# Patient Record
Sex: FEMALE | Race: WHITE | Employment: OTHER
[De-identification: names, ages, dates, MRNs, and addresses within clinical notes are randomized per-mention and may not be internally consistent; named-entity substitution may affect disease eponyms.]

---

## 2017-01-11 ENCOUNTER — PROCEDURE VISIT (OUTPATIENT)
Dept: UROLOGY | Facility: CLINIC | Age: 72
End: 2017-01-11

## 2017-01-11 VITALS — DIASTOLIC BLOOD PRESSURE: 74 MMHG | HEART RATE: 61 BPM | SYSTOLIC BLOOD PRESSURE: 133 MMHG | TEMPERATURE: 97.8 F

## 2017-01-11 DIAGNOSIS — C67.9 MALIGNANT NEOPLASM OF URINARY BLADDER, UNSPECIFIED SITE (HCC): Primary | ICD-10-CM

## 2017-01-11 LAB
BILIRUBIN, POC: ABNORMAL
BLOOD URINE, POC: ABNORMAL
CLARITY, POC: CLEAR
COLOR, POC: YELLOW
GLUCOSE URINE, POC: ABNORMAL
KETONES, POC: ABNORMAL
LEUKOCYTE EST, POC: ABNORMAL
NITRITE, POC: ABNORMAL
PH, POC: ABNORMAL
PROTEIN, POC: ABNORMAL
SPECIFIC GRAVITY, POC: ABNORMAL
UROBILINOGEN, POC: ABNORMAL

## 2017-01-11 PROCEDURE — 51720 TREATMENT OF BLADDER LESION: CPT | Performed by: NURSE PRACTITIONER

## 2017-01-11 PROCEDURE — 81003 URINALYSIS AUTO W/O SCOPE: CPT | Performed by: NURSE PRACTITIONER

## 2017-01-13 LAB
CHOLESTEROL, TOTAL: 214 MG/DL
CHOLESTEROL/HDL RATIO: 3.4
HDLC SERPL-MCNC: 63 MG/DL (ref 35–70)
LDL CHOLESTEROL CALCULATED: 129 MG/DL (ref 0–160)
TRIGL SERPL-MCNC: 109 MG/DL
TSH SERPL DL<=0.05 MIU/L-ACNC: 8.09 UIU/ML
VLDLC SERPL CALC-MCNC: 22 MG/DL

## 2017-01-17 ENCOUNTER — OFFICE VISIT (OUTPATIENT)
Dept: INTERNAL MEDICINE | Facility: CLINIC | Age: 72
End: 2017-01-17

## 2017-01-17 VITALS — HEIGHT: 64 IN | HEART RATE: 74 BPM | WEIGHT: 222 LBS | BODY MASS INDEX: 37.9 KG/M2 | RESPIRATION RATE: 14 BRPM

## 2017-01-17 DIAGNOSIS — E66.09 NON MORBID OBESITY DUE TO EXCESS CALORIES: ICD-10-CM

## 2017-01-17 DIAGNOSIS — I10 ESSENTIAL HYPERTENSION: Primary | ICD-10-CM

## 2017-01-17 DIAGNOSIS — C67.9 MALIGNANT NEOPLASM OF URINARY BLADDER, UNSPECIFIED SITE (HCC): ICD-10-CM

## 2017-01-17 DIAGNOSIS — I65.23 BILATERAL CAROTID ARTERY STENOSIS: ICD-10-CM

## 2017-01-17 DIAGNOSIS — K21.9 GASTROESOPHAGEAL REFLUX DISEASE, ESOPHAGITIS PRESENCE NOT SPECIFIED: ICD-10-CM

## 2017-01-17 DIAGNOSIS — E03.9 ACQUIRED HYPOTHYROIDISM: ICD-10-CM

## 2017-01-17 DIAGNOSIS — I25.10 CORONARY ARTERY DISEASE INVOLVING NATIVE CORONARY ARTERY OF NATIVE HEART WITHOUT ANGINA PECTORIS: ICD-10-CM

## 2017-01-17 DIAGNOSIS — I10 ESSENTIAL HYPERTENSION: ICD-10-CM

## 2017-01-17 DIAGNOSIS — M51.36 DDD (DEGENERATIVE DISC DISEASE), LUMBAR: ICD-10-CM

## 2017-01-17 DIAGNOSIS — G47.33 OSA (OBSTRUCTIVE SLEEP APNEA): ICD-10-CM

## 2017-01-17 PROCEDURE — G8484 FLU IMMUNIZE NO ADMIN: HCPCS | Performed by: INTERNAL MEDICINE

## 2017-01-17 PROCEDURE — 99213 OFFICE O/P EST LOW 20 MIN: CPT | Performed by: INTERNAL MEDICINE

## 2017-01-17 PROCEDURE — G8598 ASA/ANTIPLAT THER USED: HCPCS | Performed by: INTERNAL MEDICINE

## 2017-01-17 PROCEDURE — 1090F PRES/ABSN URINE INCON ASSESS: CPT | Performed by: INTERNAL MEDICINE

## 2017-01-17 PROCEDURE — 3014F SCREEN MAMMO DOC REV: CPT | Performed by: INTERNAL MEDICINE

## 2017-01-17 PROCEDURE — 4040F PNEUMOC VAC/ADMIN/RCVD: CPT | Performed by: INTERNAL MEDICINE

## 2017-01-17 PROCEDURE — 1123F ACP DISCUSS/DSCN MKR DOCD: CPT | Performed by: INTERNAL MEDICINE

## 2017-01-17 PROCEDURE — 3017F COLORECTAL CA SCREEN DOC REV: CPT | Performed by: INTERNAL MEDICINE

## 2017-01-17 PROCEDURE — G8427 DOCREV CUR MEDS BY ELIG CLIN: HCPCS | Performed by: INTERNAL MEDICINE

## 2017-01-17 PROCEDURE — G8400 PT W/DXA NO RESULTS DOC: HCPCS | Performed by: INTERNAL MEDICINE

## 2017-01-17 PROCEDURE — G8419 CALC BMI OUT NRM PARAM NOF/U: HCPCS | Performed by: INTERNAL MEDICINE

## 2017-01-17 PROCEDURE — 1036F TOBACCO NON-USER: CPT | Performed by: INTERNAL MEDICINE

## 2017-01-17 ASSESSMENT — ENCOUNTER SYMPTOMS
SHORTNESS OF BREATH: 0
ABDOMINAL PAIN: 0
HEARTBURN: 1
NAUSEA: 0
BACK PAIN: 1
CHEST TIGHTNESS: 0
COUGH: 0
VOMITING: 0
BLOOD IN STOOL: 0

## 2017-01-18 ENCOUNTER — PROCEDURE VISIT (OUTPATIENT)
Dept: UROLOGY | Facility: CLINIC | Age: 72
End: 2017-01-18

## 2017-01-18 VITALS
SYSTOLIC BLOOD PRESSURE: 119 MMHG | HEIGHT: 64 IN | RESPIRATION RATE: 16 BRPM | WEIGHT: 222 LBS | HEART RATE: 69 BPM | TEMPERATURE: 98.1 F | BODY MASS INDEX: 37.9 KG/M2 | DIASTOLIC BLOOD PRESSURE: 71 MMHG

## 2017-01-18 DIAGNOSIS — C67.9 MALIGNANT NEOPLASM OF URINARY BLADDER, UNSPECIFIED SITE (HCC): Primary | ICD-10-CM

## 2017-01-18 LAB
BILIRUBIN, POC: NORMAL
BLOOD URINE, POC: NORMAL
CLARITY, POC: CLEAR
COLOR, POC: YELLOW
GLUCOSE URINE, POC: NORMAL
KETONES, POC: NORMAL
LEUKOCYTE EST, POC: NORMAL
NITRITE, POC: NORMAL
PH, POC: NORMAL
PROTEIN, POC: NORMAL
SPECIFIC GRAVITY, POC: NORMAL
UROBILINOGEN, POC: NORMAL

## 2017-01-18 PROCEDURE — 51720 TREATMENT OF BLADDER LESION: CPT | Performed by: NURSE PRACTITIONER

## 2017-01-18 PROCEDURE — 81003 URINALYSIS AUTO W/O SCOPE: CPT | Performed by: NURSE PRACTITIONER

## 2017-01-25 ENCOUNTER — PROCEDURE VISIT (OUTPATIENT)
Dept: UROLOGY | Facility: CLINIC | Age: 72
End: 2017-01-25

## 2017-01-25 VITALS — DIASTOLIC BLOOD PRESSURE: 88 MMHG | HEART RATE: 79 BPM | TEMPERATURE: 98.3 F | SYSTOLIC BLOOD PRESSURE: 144 MMHG

## 2017-01-25 DIAGNOSIS — C67.9 MALIGNANT NEOPLASM OF URINARY BLADDER, UNSPECIFIED SITE (HCC): Primary | ICD-10-CM

## 2017-01-25 PROCEDURE — 81003 URINALYSIS AUTO W/O SCOPE: CPT | Performed by: NURSE PRACTITIONER

## 2017-01-25 PROCEDURE — 51720 TREATMENT OF BLADDER LESION: CPT | Performed by: NURSE PRACTITIONER

## 2017-01-26 ENCOUNTER — TELEPHONE (OUTPATIENT)
Dept: UROLOGY | Facility: CLINIC | Age: 72
End: 2017-01-26

## 2017-01-26 DIAGNOSIS — C67.9 MALIGNANT NEOPLASM OF URINARY BLADDER, UNSPECIFIED SITE (HCC): Primary | ICD-10-CM

## 2017-02-01 ENCOUNTER — PROCEDURE VISIT (OUTPATIENT)
Dept: UROLOGY | Facility: CLINIC | Age: 72
End: 2017-02-01

## 2017-02-01 VITALS — DIASTOLIC BLOOD PRESSURE: 68 MMHG | SYSTOLIC BLOOD PRESSURE: 116 MMHG | HEART RATE: 65 BPM | TEMPERATURE: 97.9 F

## 2017-02-01 DIAGNOSIS — C67.9 MALIGNANT NEOPLASM OF URINARY BLADDER, UNSPECIFIED SITE (HCC): Primary | ICD-10-CM

## 2017-02-01 PROCEDURE — 81003 URINALYSIS AUTO W/O SCOPE: CPT | Performed by: NURSE PRACTITIONER

## 2017-02-01 PROCEDURE — 51720 TREATMENT OF BLADDER LESION: CPT | Performed by: NURSE PRACTITIONER

## 2017-02-06 ENCOUNTER — HOSPITAL ENCOUNTER (OUTPATIENT)
Dept: CT IMAGING | Age: 72
Discharge: HOME | End: 2017-02-06
Payer: MEDICARE

## 2017-02-06 DIAGNOSIS — R52 PAIN: ICD-10-CM

## 2017-02-06 DIAGNOSIS — C67.9 MALIGNANT NEOPLASM OF URINARY BLADDER, UNSPECIFIED SITE (HCC): ICD-10-CM

## 2017-02-06 PROCEDURE — 74178 CT ABD&PLV WO CNTR FLWD CNTR: CPT | Performed by: RADIOLOGY

## 2017-02-06 PROCEDURE — 6360000004 HC RX CONTRAST MEDICATION: Performed by: UROLOGY

## 2017-02-06 PROCEDURE — 74178 CT ABD&PLV WO CNTR FLWD CNTR: CPT

## 2017-02-06 RX ADMIN — IOVERSOL 130 ML: 741 INJECTION INTRA-ARTERIAL; INTRAVENOUS at 08:59

## 2017-02-08 ENCOUNTER — TELEPHONE (OUTPATIENT)
Dept: UROLOGY | Facility: CLINIC | Age: 72
End: 2017-02-08

## 2017-02-08 ENCOUNTER — PROCEDURE VISIT (OUTPATIENT)
Dept: UROLOGY | Facility: CLINIC | Age: 72
End: 2017-02-08

## 2017-02-08 VITALS — DIASTOLIC BLOOD PRESSURE: 68 MMHG | TEMPERATURE: 98 F | SYSTOLIC BLOOD PRESSURE: 119 MMHG | HEART RATE: 68 BPM

## 2017-02-08 DIAGNOSIS — C67.9 MALIGNANT NEOPLASM OF URINARY BLADDER, UNSPECIFIED SITE (HCC): Primary | ICD-10-CM

## 2017-02-08 PROCEDURE — 81003 URINALYSIS AUTO W/O SCOPE: CPT | Performed by: NURSE PRACTITIONER

## 2017-02-08 PROCEDURE — 51720 TREATMENT OF BLADDER LESION: CPT | Performed by: NURSE PRACTITIONER

## 2017-02-15 ENCOUNTER — PROCEDURE VISIT (OUTPATIENT)
Dept: UROLOGY | Facility: CLINIC | Age: 72
End: 2017-02-15

## 2017-02-15 VITALS — HEART RATE: 70 BPM | DIASTOLIC BLOOD PRESSURE: 70 MMHG | TEMPERATURE: 98.1 F | SYSTOLIC BLOOD PRESSURE: 129 MMHG

## 2017-02-15 DIAGNOSIS — C67.9 MALIGNANT NEOPLASM OF URINARY BLADDER, UNSPECIFIED SITE (HCC): Primary | ICD-10-CM

## 2017-02-15 PROCEDURE — 81003 URINALYSIS AUTO W/O SCOPE: CPT | Performed by: NURSE PRACTITIONER

## 2017-02-15 PROCEDURE — 51720 TREATMENT OF BLADDER LESION: CPT | Performed by: NURSE PRACTITIONER

## 2017-02-21 ENCOUNTER — TELEPHONE (OUTPATIENT)
Dept: UROLOGY | Facility: CLINIC | Age: 72
End: 2017-02-21

## 2017-02-28 ENCOUNTER — TELEPHONE (OUTPATIENT)
Dept: UROLOGY | Facility: CLINIC | Age: 72
End: 2017-02-28

## 2017-03-06 ENCOUNTER — HOSPITAL ENCOUNTER (OUTPATIENT)
Age: 72
Discharge: HOME | End: 2017-03-06
Payer: MEDICARE

## 2017-03-06 LAB
-: NORMAL
AMORPHOUS: NORMAL
BACTERIA: NORMAL
BILIRUBIN URINE: NEGATIVE
CASTS UA: NORMAL /LPF (ref 0–8)
COLOR: YELLOW
COMMENT UA: ABNORMAL
CRYSTALS, UA: NORMAL /HPF
EPITHELIAL CELLS UA: NORMAL /HPF (ref 0–5)
GLUCOSE URINE: NEGATIVE
KETONES, URINE: NEGATIVE
LEUKOCYTE ESTERASE, URINE: ABNORMAL
MUCUS: NORMAL
NITRITE, URINE: NEGATIVE
OTHER OBSERVATIONS UA: NORMAL
PH UA: 6 (ref 5–8)
PROTEIN UA: NEGATIVE
RBC UA: NORMAL /HPF (ref 0–4)
RENAL EPITHELIAL, UA: NORMAL /HPF
SPECIFIC GRAVITY UA: 1.01 (ref 1–1.03)
TRICHOMONAS: NORMAL
TURBIDITY: CLEAR
URINE HGB: ABNORMAL
UROBILINOGEN, URINE: NORMAL
WBC UA: NORMAL /HPF (ref 0–5)
YEAST: NORMAL

## 2017-03-06 PROCEDURE — 81001 URINALYSIS AUTO W/SCOPE: CPT

## 2017-03-07 ENCOUNTER — HOSPITAL ENCOUNTER (OUTPATIENT)
Dept: PREADMISSION TESTING | Age: 72
Discharge: HOME | End: 2017-03-07
Payer: MEDICARE

## 2017-03-07 DIAGNOSIS — Z01.818 PRE-OP TESTING: ICD-10-CM

## 2017-03-07 DIAGNOSIS — R31.9 HEMATURIA: ICD-10-CM

## 2017-03-07 DIAGNOSIS — C67.9 MALIGNANT NEOPLASM OF URINARY BLADDER, UNSPECIFIED SITE (HCC): Primary | ICD-10-CM

## 2017-03-21 ENCOUNTER — TELEPHONE (OUTPATIENT)
Dept: UROLOGY | Age: 72
End: 2017-03-21

## 2017-03-24 ENCOUNTER — HOSPITAL ENCOUNTER (OUTPATIENT)
Age: 72
Discharge: HOME | End: 2017-03-24
Payer: MEDICARE

## 2017-03-24 PROCEDURE — 88120 CYTP URNE 3-5 PROBES EA SPEC: CPT

## 2017-03-24 PROCEDURE — 88305 TISSUE EXAM BY PATHOLOGIST: CPT

## 2017-03-30 LAB — SURGICAL PATHOLOGY REPORT: NORMAL

## 2017-04-04 LAB — UROTHELIAL CANCER DETECTION: NORMAL

## 2017-04-11 ENCOUNTER — OFFICE VISIT (OUTPATIENT)
Dept: UROLOGY | Age: 72
End: 2017-04-11
Payer: MEDICARE

## 2017-04-11 VITALS — HEART RATE: 71 BPM | TEMPERATURE: 98.1 F | SYSTOLIC BLOOD PRESSURE: 117 MMHG | DIASTOLIC BLOOD PRESSURE: 69 MMHG

## 2017-04-11 DIAGNOSIS — C67.4 MALIGNANT NEOPLASM OF POSTERIOR WALL OF URINARY BLADDER (HCC): Primary | ICD-10-CM

## 2017-04-11 PROCEDURE — G8598 ASA/ANTIPLAT THER USED: HCPCS | Performed by: UROLOGY

## 2017-04-11 PROCEDURE — 99212 OFFICE O/P EST SF 10 MIN: CPT | Performed by: UROLOGY

## 2017-04-11 PROCEDURE — 1090F PRES/ABSN URINE INCON ASSESS: CPT | Performed by: UROLOGY

## 2017-04-11 PROCEDURE — 4040F PNEUMOC VAC/ADMIN/RCVD: CPT | Performed by: UROLOGY

## 2017-04-11 PROCEDURE — 3017F COLORECTAL CA SCREEN DOC REV: CPT | Performed by: UROLOGY

## 2017-04-11 PROCEDURE — G8400 PT W/DXA NO RESULTS DOC: HCPCS | Performed by: UROLOGY

## 2017-04-11 PROCEDURE — 1123F ACP DISCUSS/DSCN MKR DOCD: CPT | Performed by: UROLOGY

## 2017-04-11 PROCEDURE — G8427 DOCREV CUR MEDS BY ELIG CLIN: HCPCS | Performed by: UROLOGY

## 2017-04-11 PROCEDURE — G8417 CALC BMI ABV UP PARAM F/U: HCPCS | Performed by: UROLOGY

## 2017-04-11 PROCEDURE — 3014F SCREEN MAMMO DOC REV: CPT | Performed by: UROLOGY

## 2017-04-11 PROCEDURE — 1036F TOBACCO NON-USER: CPT | Performed by: UROLOGY

## 2017-04-11 ASSESSMENT — ENCOUNTER SYMPTOMS
COUGH: 0
EYE REDNESS: 0
EYE PAIN: 0
COLOR CHANGE: 0
BACK PAIN: 0
SHORTNESS OF BREATH: 0
WHEEZING: 0
VOMITING: 0
ABDOMINAL PAIN: 0
NAUSEA: 0

## 2017-04-17 ENCOUNTER — TELEPHONE (OUTPATIENT)
Dept: UROLOGY | Age: 72
End: 2017-04-17

## 2017-07-11 ENCOUNTER — PROCEDURE VISIT (OUTPATIENT)
Dept: UROLOGY | Age: 72
End: 2017-07-11
Payer: MEDICARE

## 2017-07-11 ENCOUNTER — HOSPITAL ENCOUNTER (OUTPATIENT)
Age: 72
Setting detail: SPECIMEN
Discharge: HOME | End: 2017-07-11
Payer: MEDICARE

## 2017-07-11 VITALS — TEMPERATURE: 98.2 F | HEART RATE: 71 BPM | DIASTOLIC BLOOD PRESSURE: 71 MMHG | SYSTOLIC BLOOD PRESSURE: 140 MMHG

## 2017-07-11 DIAGNOSIS — L30.4 INTERTRIGO: ICD-10-CM

## 2017-07-11 DIAGNOSIS — R30.0 DYSURIA: Primary | ICD-10-CM

## 2017-07-11 DIAGNOSIS — C67.9 MALIGNANT NEOPLASM OF URINARY BLADDER, UNSPECIFIED SITE (HCC): ICD-10-CM

## 2017-07-11 PROCEDURE — 52000 CYSTOURETHROSCOPY: CPT | Performed by: UROLOGY

## 2017-07-11 PROCEDURE — 1036F TOBACCO NON-USER: CPT | Performed by: UROLOGY

## 2017-07-11 RX ORDER — CLOTRIMAZOLE AND BETAMETHASONE DIPROPIONATE 10; .64 MG/G; MG/G
CREAM TOPICAL
Qty: 45 G | Refills: 1 | Status: SHIPPED | OUTPATIENT
Start: 2017-07-11 | End: 2019-05-28 | Stop reason: SDUPTHER

## 2017-07-11 RX ORDER — SULFAMETHOXAZOLE AND TRIMETHOPRIM 800; 160 MG/1; MG/1
1 TABLET ORAL 2 TIMES DAILY
Qty: 6 TABLET | Refills: 0 | Status: SHIPPED | OUTPATIENT
Start: 2017-07-11 | End: 2017-07-21

## 2017-07-13 LAB
CULTURE: ABNORMAL
CULTURE: ABNORMAL
Lab: ABNORMAL
ORGANISM: ABNORMAL
SPECIMEN DESCRIPTION: ABNORMAL
STATUS: ABNORMAL

## 2017-07-21 LAB — UROTHELIAL CANCER DETECTION: NORMAL

## 2017-08-15 ENCOUNTER — OFFICE VISIT (OUTPATIENT)
Dept: PRIMARY CARE CLINIC | Age: 72
End: 2017-08-15
Payer: MEDICARE

## 2017-08-15 VITALS
SYSTOLIC BLOOD PRESSURE: 130 MMHG | RESPIRATION RATE: 14 BRPM | WEIGHT: 221 LBS | HEART RATE: 76 BPM | BODY MASS INDEX: 37.73 KG/M2 | DIASTOLIC BLOOD PRESSURE: 82 MMHG | HEIGHT: 64 IN

## 2017-08-15 DIAGNOSIS — E03.9 ACQUIRED HYPOTHYROIDISM: ICD-10-CM

## 2017-08-15 DIAGNOSIS — Z23 NEED FOR PROPHYLACTIC VACCINATION WITH TETANUS-DIPHTHERIA (TD): ICD-10-CM

## 2017-08-15 DIAGNOSIS — I65.23 BILATERAL CAROTID ARTERY STENOSIS: ICD-10-CM

## 2017-08-15 DIAGNOSIS — N31.9 NEUROGENIC BLADDER: ICD-10-CM

## 2017-08-15 DIAGNOSIS — M51.36 DDD (DEGENERATIVE DISC DISEASE), LUMBAR: ICD-10-CM

## 2017-08-15 DIAGNOSIS — I10 ESSENTIAL HYPERTENSION: Primary | ICD-10-CM

## 2017-08-15 DIAGNOSIS — G47.33 OSA (OBSTRUCTIVE SLEEP APNEA): ICD-10-CM

## 2017-08-15 DIAGNOSIS — K21.9 GASTROESOPHAGEAL REFLUX DISEASE, ESOPHAGITIS PRESENCE NOT SPECIFIED: ICD-10-CM

## 2017-08-15 DIAGNOSIS — C67.9 MALIGNANT NEOPLASM OF URINARY BLADDER, UNSPECIFIED SITE (HCC): ICD-10-CM

## 2017-08-15 DIAGNOSIS — I25.10 CORONARY ARTERY DISEASE INVOLVING NATIVE CORONARY ARTERY OF NATIVE HEART WITHOUT ANGINA PECTORIS: ICD-10-CM

## 2017-08-15 DIAGNOSIS — E66.09 NON MORBID OBESITY DUE TO EXCESS CALORIES: ICD-10-CM

## 2017-08-15 DIAGNOSIS — I65.23 ASYMPTOMATIC BILATERAL CAROTID ARTERY STENOSIS: ICD-10-CM

## 2017-08-15 PROCEDURE — G8417 CALC BMI ABV UP PARAM F/U: HCPCS | Performed by: INTERNAL MEDICINE

## 2017-08-15 PROCEDURE — 1036F TOBACCO NON-USER: CPT | Performed by: INTERNAL MEDICINE

## 2017-08-15 PROCEDURE — G8427 DOCREV CUR MEDS BY ELIG CLIN: HCPCS | Performed by: INTERNAL MEDICINE

## 2017-08-15 PROCEDURE — 1123F ACP DISCUSS/DSCN MKR DOCD: CPT | Performed by: INTERNAL MEDICINE

## 2017-08-15 PROCEDURE — 4040F PNEUMOC VAC/ADMIN/RCVD: CPT | Performed by: INTERNAL MEDICINE

## 2017-08-15 PROCEDURE — 99214 OFFICE O/P EST MOD 30 MIN: CPT | Performed by: INTERNAL MEDICINE

## 2017-08-15 PROCEDURE — 3017F COLORECTAL CA SCREEN DOC REV: CPT | Performed by: INTERNAL MEDICINE

## 2017-08-15 PROCEDURE — 3014F SCREEN MAMMO DOC REV: CPT | Performed by: INTERNAL MEDICINE

## 2017-08-15 PROCEDURE — G8400 PT W/DXA NO RESULTS DOC: HCPCS | Performed by: INTERNAL MEDICINE

## 2017-08-15 PROCEDURE — G8598 ASA/ANTIPLAT THER USED: HCPCS | Performed by: INTERNAL MEDICINE

## 2017-08-15 PROCEDURE — 1090F PRES/ABSN URINE INCON ASSESS: CPT | Performed by: INTERNAL MEDICINE

## 2017-08-15 RX ORDER — LEVOTHYROXINE SODIUM 0.05 MG/1
TABLET ORAL
Qty: 90 TABLET | Refills: 3 | Status: SHIPPED | OUTPATIENT
Start: 2017-08-15 | End: 2018-08-16 | Stop reason: SDUPTHER

## 2017-08-15 RX ORDER — LEVOTHYROXINE SODIUM 0.05 MG/1
TABLET ORAL
Qty: 90 TABLET | Refills: 0 | Status: SHIPPED | OUTPATIENT
Start: 2017-08-15 | End: 2017-08-15 | Stop reason: SDUPTHER

## 2017-08-15 RX ORDER — TETANUS AND DIPHTHERIA TOXOIDS ADSORBED 2; 2 [LF]/.5ML; [LF]/.5ML
0.5 INJECTION INTRAMUSCULAR ONCE
Qty: 0.5 ML | Refills: 0 | Status: SHIPPED | OUTPATIENT
Start: 2017-08-15 | End: 2017-08-15

## 2017-08-15 RX ORDER — LOSARTAN POTASSIUM 50 MG/1
50 TABLET ORAL DAILY
Qty: 90 TABLET | Refills: 3 | Status: SHIPPED | OUTPATIENT
Start: 2017-08-15 | End: 2018-08-16 | Stop reason: SDUPTHER

## 2017-08-15 ASSESSMENT — ENCOUNTER SYMPTOMS
ABDOMINAL PAIN: 0
NAUSEA: 0
COUGH: 0
CHEST TIGHTNESS: 0
HEARTBURN: 1
BLOOD IN STOOL: 0
BACK PAIN: 1
SHORTNESS OF BREATH: 0
VOMITING: 0

## 2017-10-13 ENCOUNTER — HOSPITAL ENCOUNTER (OUTPATIENT)
Age: 72
Setting detail: SPECIMEN
Discharge: HOME | End: 2017-10-13
Payer: MEDICARE

## 2017-10-13 ENCOUNTER — PROCEDURE VISIT (OUTPATIENT)
Dept: UROLOGY | Age: 72
End: 2017-10-13
Payer: MEDICARE

## 2017-10-13 VITALS — SYSTOLIC BLOOD PRESSURE: 134 MMHG | DIASTOLIC BLOOD PRESSURE: 76 MMHG | TEMPERATURE: 97.8 F | HEART RATE: 74 BPM

## 2017-10-13 DIAGNOSIS — C67.8 MALIGNANT NEOPLASM OF OVERLAPPING SITES OF BLADDER (HCC): Primary | ICD-10-CM

## 2017-10-13 DIAGNOSIS — C67.8 MALIGNANT NEOPLASM OF OVERLAPPING SITES OF BLADDER (HCC): ICD-10-CM

## 2017-10-13 DIAGNOSIS — N32.81 OAB (OVERACTIVE BLADDER): ICD-10-CM

## 2017-10-13 PROCEDURE — 1036F TOBACCO NON-USER: CPT | Performed by: UROLOGY

## 2017-10-13 PROCEDURE — 52000 CYSTOURETHROSCOPY: CPT | Performed by: UROLOGY

## 2017-10-13 RX ORDER — OXYBUTYNIN CHLORIDE 5 MG/1
5 TABLET, EXTENDED RELEASE ORAL DAILY
Qty: 90 TABLET | Refills: 3 | Status: SHIPPED | OUTPATIENT
Start: 2017-10-13 | End: 2018-12-24 | Stop reason: SDUPTHER

## 2017-10-26 LAB — UROTHELIAL CANCER DETECTION: NORMAL

## 2017-10-31 ENCOUNTER — TELEPHONE (OUTPATIENT)
Dept: UROLOGY | Age: 72
End: 2017-10-31

## 2017-10-31 ENCOUNTER — PROCEDURE VISIT (OUTPATIENT)
Dept: UROLOGY | Age: 72
End: 2017-10-31
Payer: MEDICARE

## 2017-10-31 VITALS — TEMPERATURE: 98.3 F | HEART RATE: 76 BPM | SYSTOLIC BLOOD PRESSURE: 152 MMHG | DIASTOLIC BLOOD PRESSURE: 82 MMHG

## 2017-10-31 DIAGNOSIS — C67.8 MALIGNANT NEOPLASM OF OVERLAPPING SITES OF BLADDER (HCC): Primary | ICD-10-CM

## 2017-10-31 PROCEDURE — 81003 URINALYSIS AUTO W/O SCOPE: CPT | Performed by: UROLOGY

## 2017-10-31 PROCEDURE — 51720 TREATMENT OF BLADDER LESION: CPT | Performed by: UROLOGY

## 2017-11-01 NOTE — TELEPHONE ENCOUNTER
Called to check on Pt and she stated \"things calmed down\". Pt advised to call the office if she developed any symptoms of UTI.

## 2017-11-07 ENCOUNTER — PROCEDURE VISIT (OUTPATIENT)
Dept: UROLOGY | Age: 72
End: 2017-11-07
Payer: MEDICARE

## 2017-11-07 VITALS — TEMPERATURE: 97.7 F | HEART RATE: 68 BPM | SYSTOLIC BLOOD PRESSURE: 145 MMHG | DIASTOLIC BLOOD PRESSURE: 82 MMHG

## 2017-11-07 DIAGNOSIS — C67.8 MALIGNANT NEOPLASM OF OVERLAPPING SITES OF BLADDER (HCC): Primary | ICD-10-CM

## 2017-11-07 PROCEDURE — 81003 URINALYSIS AUTO W/O SCOPE: CPT | Performed by: NURSE PRACTITIONER

## 2017-11-07 PROCEDURE — 51720 TREATMENT OF BLADDER LESION: CPT | Performed by: NURSE PRACTITIONER

## 2017-11-07 NOTE — PROGRESS NOTES
BCG Treatment Note:    11/7/17    Treatment 2 of 3. Risks, benefits, and some potential complications were discussed at length with the patient including infection, bleeding, voiding discomfort, fever, chills, cough, joint pain, and others. All questions were answered. Sterile technique and intraurethral analgesia were used. A red rubber catheter was gently placed through the urethra and into the patients bladder. The bladder was emptied of all urine. Approximately 50 cc of BCG was instilled into the patient's bladder. The red rubber catheter was gently removed. The patient was given the following instructions before leaving the office after treatment:  - The patient was instructed not to empty her bladder for at least two hours  - Void while seated to avoid splashing of urine.  - For the 6 hours following the treatment, add an equal amount of bleach to the toilet/urine to disinfect for 15 minutes before flushing.   - Use the same toilet to void in for 24 hours after the BCG, the clean toilet with bleach. The procedure was well-tolerated and without complications. Verbal and written instructions were given to call the office for joint pain, cough,skin rash,fever (temp higher than 101.5 for more than 24 hours, or if your symptoms are intense or last longer than 72 hours. The patient stated that they understood these instructions and would comply with them.           Electronically signed by Primo Schreiber on 11/7/2017 at 8:10 AM

## 2017-11-14 ENCOUNTER — PROCEDURE VISIT (OUTPATIENT)
Dept: UROLOGY | Age: 72
End: 2017-11-14
Payer: MEDICARE

## 2017-11-14 VITALS — SYSTOLIC BLOOD PRESSURE: 136 MMHG | TEMPERATURE: 97.9 F | DIASTOLIC BLOOD PRESSURE: 75 MMHG | HEART RATE: 68 BPM

## 2017-11-14 DIAGNOSIS — C67.8 MALIGNANT NEOPLASM OF OVERLAPPING SITES OF BLADDER (HCC): Primary | ICD-10-CM

## 2017-11-14 PROCEDURE — 99999 PR OFFICE/OUTPT VISIT,PROCEDURE ONLY: CPT | Performed by: NURSE PRACTITIONER

## 2017-11-14 PROCEDURE — 51720 TREATMENT OF BLADDER LESION: CPT | Performed by: NURSE PRACTITIONER

## 2017-11-14 PROCEDURE — 81003 URINALYSIS AUTO W/O SCOPE: CPT | Performed by: NURSE PRACTITIONER

## 2017-11-14 NOTE — PROGRESS NOTES
BCG Treatment Note:    11/14/17    Treatment 3 of 3. Risks, benefits, and some potential complications were discussed at length with the patient including infection, bleeding, voiding discomfort, fever, chills, cough, joint pain, and others. All questions were answered. Sterile technique and intraurethral analgesia were used. A red rubber catheter was gently placed through the urethra and into the patients bladder. The bladder was emptied of all urine. Approximately 50 cc of BCG was instilled into the patient's bladder. The red rubber catheter was gently removed. The patient was given the following instructions before leaving the office after treatment:  - The patient was instructed not to empty her bladder for at least two hours  - Void while seated to avoid splashing of urine.  - For the 6 hours following the treatment, add an equal amount of bleach to the toilet/urine to disinfect for 15 minutes before flushing.   - Use the same toilet to void in for 24 hours after the BCG, the clean toilet with bleach. The procedure was well-tolerated and without complications. Verbal and written instructions were given to call the office for joint pain, cough,skin rash,fever (temp higher than 101.5 for more than 24 hours, or if your symptoms are intense or last longer than 72 hours. The patient stated that they understood these instructions and would comply with them.           Electronically signed by Manav Pemberton on 11/14/2017 at 1:55 PM

## 2017-12-26 ENCOUNTER — OFFICE VISIT (OUTPATIENT)
Dept: PRIMARY CARE CLINIC | Age: 72
End: 2017-12-26
Payer: MEDICARE

## 2017-12-26 VITALS
BODY MASS INDEX: 38.62 KG/M2 | SYSTOLIC BLOOD PRESSURE: 112 MMHG | DIASTOLIC BLOOD PRESSURE: 60 MMHG | TEMPERATURE: 97.7 F | WEIGHT: 225 LBS

## 2017-12-26 DIAGNOSIS — J01.40 ACUTE NON-RECURRENT PANSINUSITIS: ICD-10-CM

## 2017-12-26 DIAGNOSIS — J40 BRONCHITIS: Primary | ICD-10-CM

## 2017-12-26 PROCEDURE — 3014F SCREEN MAMMO DOC REV: CPT | Performed by: NURSE PRACTITIONER

## 2017-12-26 PROCEDURE — 1123F ACP DISCUSS/DSCN MKR DOCD: CPT | Performed by: NURSE PRACTITIONER

## 2017-12-26 PROCEDURE — G8417 CALC BMI ABV UP PARAM F/U: HCPCS | Performed by: NURSE PRACTITIONER

## 2017-12-26 PROCEDURE — 1090F PRES/ABSN URINE INCON ASSESS: CPT | Performed by: NURSE PRACTITIONER

## 2017-12-26 PROCEDURE — G8484 FLU IMMUNIZE NO ADMIN: HCPCS | Performed by: NURSE PRACTITIONER

## 2017-12-26 PROCEDURE — 4040F PNEUMOC VAC/ADMIN/RCVD: CPT | Performed by: NURSE PRACTITIONER

## 2017-12-26 PROCEDURE — G8598 ASA/ANTIPLAT THER USED: HCPCS | Performed by: NURSE PRACTITIONER

## 2017-12-26 PROCEDURE — G8400 PT W/DXA NO RESULTS DOC: HCPCS | Performed by: NURSE PRACTITIONER

## 2017-12-26 PROCEDURE — G8427 DOCREV CUR MEDS BY ELIG CLIN: HCPCS | Performed by: NURSE PRACTITIONER

## 2017-12-26 PROCEDURE — 99213 OFFICE O/P EST LOW 20 MIN: CPT | Performed by: NURSE PRACTITIONER

## 2017-12-26 PROCEDURE — 3017F COLORECTAL CA SCREEN DOC REV: CPT | Performed by: NURSE PRACTITIONER

## 2017-12-26 PROCEDURE — 1036F TOBACCO NON-USER: CPT | Performed by: NURSE PRACTITIONER

## 2017-12-26 RX ORDER — AZITHROMYCIN 250 MG/1
TABLET, FILM COATED ORAL
Qty: 1 PACKET | Refills: 0 | Status: SHIPPED | OUTPATIENT
Start: 2017-12-26 | End: 2018-05-01 | Stop reason: ALTCHOICE

## 2017-12-26 ASSESSMENT — ENCOUNTER SYMPTOMS
CONSTIPATION: 0
BLOOD IN STOOL: 0
SINUS PAIN: 0
RHINORRHEA: 1
VOICE CHANGE: 1
DIARRHEA: 0
SORE THROAT: 1
SINUS PRESSURE: 1
VOMITING: 0
COUGH: 1
TROUBLE SWALLOWING: 0
SINUS COMPLAINT: 1
WHEEZING: 0
CHEST TIGHTNESS: 0
SHORTNESS OF BREATH: 1
ABDOMINAL PAIN: 0
HOARSE VOICE: 1
NAUSEA: 0

## 2017-12-26 ASSESSMENT — PATIENT HEALTH QUESTIONNAIRE - PHQ9
1. LITTLE INTEREST OR PLEASURE IN DOING THINGS: 0
SUM OF ALL RESPONSES TO PHQ9 QUESTIONS 1 & 2: 0
SUM OF ALL RESPONSES TO PHQ QUESTIONS 1-9: 0
2. FEELING DOWN, DEPRESSED OR HOPELESS: 0

## 2017-12-26 NOTE — PROGRESS NOTES
[address]  [address]  [place] [address]  Dept: [phone]  Dept Fax: [phone]    Bossman Cantu is a 74 y.o. female who presents today for her medical conditions/complaints as noted below. Bossman Cantu is c/o of   Chief Complaint   Patient presents with    Sinus Problem     onset almost a week     Chest Congestion         HPI:     Here today for persistent sinus congestion  Developed productive cough about 3-4 days ago      Sinus Problem   This is a new problem. The problem has been gradually worsening since onset. Associated symptoms include chills, congestion, coughing, diaphoresis, a hoarse voice, shortness of breath, sinus pressure and a sore throat. Pertinent negatives include no ear pain, headaches or sneezing. Past treatments include oral decongestants (claritin D). The treatment provided no relief. No results found for: LABA1C          ( goal A1C is < 7)   No results found for: LABMICR  LDL Calculated (mg/dL)   Date Value   01/13/2017 129   10/06/2015 83   08/20/2014 66       (goal LDL is <100)   BUN (mg/dL)   Date Value   11/09/2016 19     BP Readings from Last 3 Encounters:   12/26/17 112/60   11/14/17 136/75   11/07/17 (!) 145/82          (goal 120/80)    Past Medical History:   Diagnosis Date    AK (actinic keratosis)     Anesthesia     HARD TIME WAKING UP \"SOMETIMES\".  Asymptomatic bilateral carotid artery stenosis 3/10/2015    Bladder cancer (HCC)     bladder, first time 1993    CAD (coronary artery disease)     Colon polyp     Dr Sherril Libman Difficult intubation     SMALL MOUTH AND AIRWAY    GERD (gastroesophageal reflux disease)     Hypertension     Hypothyroid     Obesity     DAISY (obstructive sleep apnea)     NO MACHINE , she does not believe she has sleep apnea    Stress incontinence     Wears glasses     FOR READING.       Past Surgical History:   Procedure Laterality Date    BLADDER SURGERY  8/1993 pain, blood in stool, constipation, diarrhea, nausea and vomiting. Endocrine: Negative for cold intolerance, heat intolerance, polydipsia, polyphagia and polyuria. Genitourinary: Negative for difficulty urinating, frequency, hematuria and urgency. Musculoskeletal: Negative for arthralgias and myalgias. Skin: Negative for rash. Allergic/Immunologic: Negative for environmental allergies. Neurological: Negative for dizziness, weakness, light-headedness and headaches. Psychiatric/Behavioral: Negative for confusion. The patient is not nervous/anxious. Objective:     Physical Exam   Constitutional: She is oriented to person, place, and time. She appears well-developed and well-nourished. HENT:   Head: Normocephalic. Eyes: Conjunctivae and EOM are normal. Pupils are equal, round, and reactive to light. Neck: Normal range of motion. Cardiovascular: Normal rate, regular rhythm, normal heart sounds and intact distal pulses. No murmur heard. Pulmonary/Chest: Effort normal. She has wheezes. Expiratory wheeze throughout   Abdominal: Soft. Bowel sounds are normal. She exhibits no distension. Musculoskeletal: Normal range of motion. Neurological: She is alert and oriented to person, place, and time. Skin: Skin is warm and dry. Psychiatric: She has a normal mood and affect. Her behavior is normal. Judgment and thought content normal.     /60 (Site: Left Arm, Position: Sitting, Cuff Size: Small Adult)   Temp 97.7 °F (36.5 °C) (Oral)   Wt 225 lb (102.1 kg)   BMI 38.62 kg/m²     Assessment:      1. Bronchitis  azithromycin (ZITHROMAX) 250 MG tablet   2. Acute non-recurrent pansinusitis  azithromycin (ZITHROMAX) 250 MG tablet             Plan:      Return if symptoms worsen or fail to improve. Orders Placed This Encounter   Medications    azithromycin (ZITHROMAX) 250 MG tablet     Sig: Take 2 tabs (500 mg) on Day 1, and take 1 tab (250 mg) on days 2 through 5.      Dispense:  1

## 2017-12-26 NOTE — PROGRESS NOTES
Visit Information    Have you changed or started any medications since your last visit including any over-the-counter medicines, vitamins, or herbal medicines? no   Have you stopped taking any of your medications? Is so, why? -  no  Are you having any side effects from any of your medications? - no    Have you seen any other physician or provider since your last visit?  no   Have you had any other diagnostic tests since your last visit?  no   Have you been seen in the emergency room and/or had an admission in a hospital since we last saw you?  no   Have you had your routine dental cleaning in the past 6 months?  no     Do you have an active MyChart account? If no, what is the barrier?   Yes    Patient Care Team:  Rambo Pagan DO as PCP - [address] [place] [address], DO as PCP - S Attributed Provider  Boby Kendrick MD as Consulting Physician (Ophthalmology)  Satish Stauffer MD as Consulting Physician  Hay Watkins DO as Consulting Physician (Cardiology)  Noah Chong as Consulting Physician (Gastroenterology)  Brandon Fiore MD as Surgeon (General Surgery)  Fernando Ruby MD as Surgeon (Vascular Surgery)  Falguni Rojas MD as Consulting Physician (Urology)    Medical History Review  Past Medical, Family, and Social History reviewed and does not contribute to the patient presenting condition    Health Maintenance   Topic Date Due    Flu vaccine (1) 01/16/2018 (Originally 9/1/2017)    Pneumococcal low/med risk (1 of 2 - PCV13) 01/17/2018 (Originally 4/7/2008)    DTaP/Tdap/Td vaccine (1 - Tdap) 08/14/2018 (Originally [birth year]7/1962)    Breast cancer screen  10/06/2018    Lipid screen  01/13/2022    Colon cancer screen colonoscopy  03/15/2026    Zostavax vaccine  Addressed    DEXA (modify frequency per FRAX score)  Addressed

## 2018-01-08 ENCOUNTER — HOSPITAL ENCOUNTER (OUTPATIENT)
Dept: MAMMOGRAPHY | Age: 73
Discharge: HOME | End: 2018-01-08
Payer: MEDICARE

## 2018-01-08 DIAGNOSIS — Z12.31 ENCOUNTER FOR SCREENING MAMMOGRAM FOR BREAST CANCER: ICD-10-CM

## 2018-01-08 PROCEDURE — 77063 BREAST TOMOSYNTHESIS BI: CPT

## 2018-01-08 NOTE — PROGRESS NOTES
Mammogram results have been reviewed  Results are satisfactory and no further action is needed at this time  Will review the report with the patient at the next visit   Please call patient with results  thanks

## 2018-01-26 ENCOUNTER — HOSPITAL ENCOUNTER (OUTPATIENT)
Age: 73
Setting detail: SPECIMEN
Discharge: HOME | End: 2018-01-26
Payer: MEDICARE

## 2018-01-26 ENCOUNTER — PROCEDURE VISIT (OUTPATIENT)
Dept: UROLOGY | Age: 73
End: 2018-01-26
Payer: MEDICARE

## 2018-01-26 VITALS — TEMPERATURE: 98.2 F | HEART RATE: 72 BPM | DIASTOLIC BLOOD PRESSURE: 79 MMHG | SYSTOLIC BLOOD PRESSURE: 146 MMHG

## 2018-01-26 DIAGNOSIS — C67.8 MALIGNANT NEOPLASM OF OVERLAPPING SITES OF BLADDER (HCC): ICD-10-CM

## 2018-01-26 DIAGNOSIS — C67.8 MALIGNANT NEOPLASM OF OVERLAPPING SITES OF BLADDER (HCC): Primary | ICD-10-CM

## 2018-01-26 PROCEDURE — 52000 CYSTOURETHROSCOPY: CPT | Performed by: UROLOGY

## 2018-01-26 PROCEDURE — 99999 PR OFFICE/OUTPT VISIT,PROCEDURE ONLY: CPT | Performed by: UROLOGY

## 2018-01-26 NOTE — PROGRESS NOTES
Cystoscopy Operative Note (1/26/18): Surgeon: Paul Saeed  Anesthesia: Urethral 2% Xylocaine  Indications: bladder cancer  Position: Dorsal Lithotomy    Findings:   The patient was prepped and draped in the usual sterile fashion. The flexible cystoscope was advanced through the urethra and into the bladder. The bladder was thoroughly inspected and the following was noted:    Vagina: normal appearing vagina with normal color and discharge, no lesions  Residual Urine: mild  Urethra: not indicated and normal appearing urethra with no masses, tenderness or lesions  Bladder: No tumors or CIS noted. No bladder diverticulum. There was mild trabeculation noted. Ureters: Clear efflux from both ureters. Orifices with normal configuration and location. The cystoscope was removed. The patient tolerated the procedure well. One area on left lateral, posterior wall that looks erythematous. She did just finish BCG. Had CIS November 2016. FISH sent, if positive, needs biopsy, otherwise cysto in 3 months.

## 2018-02-01 LAB — UROTHELIAL CANCER DETECTION: NORMAL

## 2018-05-01 ENCOUNTER — HOSPITAL ENCOUNTER (OUTPATIENT)
Age: 73
Setting detail: SPECIMEN
Discharge: HOME | End: 2018-05-01
Payer: MEDICARE

## 2018-05-01 ENCOUNTER — PROCEDURE VISIT (OUTPATIENT)
Dept: UROLOGY | Age: 73
End: 2018-05-01
Payer: MEDICARE

## 2018-05-01 VITALS — SYSTOLIC BLOOD PRESSURE: 155 MMHG | HEART RATE: 76 BPM | DIASTOLIC BLOOD PRESSURE: 84 MMHG | TEMPERATURE: 98.4 F

## 2018-05-01 DIAGNOSIS — C67.9 MALIGNANT NEOPLASM OF URINARY BLADDER, UNSPECIFIED SITE (HCC): ICD-10-CM

## 2018-05-01 DIAGNOSIS — C67.9 MALIGNANT NEOPLASM OF URINARY BLADDER, UNSPECIFIED SITE (HCC): Primary | ICD-10-CM

## 2018-05-01 PROCEDURE — 52000 CYSTOURETHROSCOPY: CPT | Performed by: UROLOGY

## 2018-05-01 PROCEDURE — 99999 PR OFFICE/OUTPT VISIT,PROCEDURE ONLY: CPT | Performed by: UROLOGY

## 2018-05-07 ENCOUNTER — OFFICE VISIT (OUTPATIENT)
Dept: PRIMARY CARE CLINIC | Age: 73
End: 2018-05-07
Payer: MEDICARE

## 2018-05-07 VITALS
SYSTOLIC BLOOD PRESSURE: 138 MMHG | OXYGEN SATURATION: 97 % | DIASTOLIC BLOOD PRESSURE: 80 MMHG | RESPIRATION RATE: 16 BRPM | BODY MASS INDEX: 40.27 KG/M2 | WEIGHT: 234.6 LBS | HEART RATE: 66 BPM

## 2018-05-07 DIAGNOSIS — L60.3 DYSTROPHIC NAIL: ICD-10-CM

## 2018-05-07 DIAGNOSIS — I10 ESSENTIAL HYPERTENSION: Primary | ICD-10-CM

## 2018-05-07 DIAGNOSIS — E03.9 ACQUIRED HYPOTHYROIDISM: ICD-10-CM

## 2018-05-07 DIAGNOSIS — H93.19 TINNITUS, UNSPECIFIED LATERALITY: ICD-10-CM

## 2018-05-07 DIAGNOSIS — E78.5 DYSLIPIDEMIA: ICD-10-CM

## 2018-05-07 PROCEDURE — G8427 DOCREV CUR MEDS BY ELIG CLIN: HCPCS | Performed by: FAMILY MEDICINE

## 2018-05-07 PROCEDURE — 1123F ACP DISCUSS/DSCN MKR DOCD: CPT | Performed by: FAMILY MEDICINE

## 2018-05-07 PROCEDURE — 4040F PNEUMOC VAC/ADMIN/RCVD: CPT | Performed by: FAMILY MEDICINE

## 2018-05-07 PROCEDURE — G8598 ASA/ANTIPLAT THER USED: HCPCS | Performed by: FAMILY MEDICINE

## 2018-05-07 PROCEDURE — 1036F TOBACCO NON-USER: CPT | Performed by: FAMILY MEDICINE

## 2018-05-07 PROCEDURE — 1090F PRES/ABSN URINE INCON ASSESS: CPT | Performed by: FAMILY MEDICINE

## 2018-05-07 PROCEDURE — G8400 PT W/DXA NO RESULTS DOC: HCPCS | Performed by: FAMILY MEDICINE

## 2018-05-07 PROCEDURE — 99214 OFFICE O/P EST MOD 30 MIN: CPT | Performed by: FAMILY MEDICINE

## 2018-05-07 PROCEDURE — 3017F COLORECTAL CA SCREEN DOC REV: CPT | Performed by: FAMILY MEDICINE

## 2018-05-07 PROCEDURE — G8417 CALC BMI ABV UP PARAM F/U: HCPCS | Performed by: FAMILY MEDICINE

## 2018-05-14 LAB — UROTHELIAL CANCER DETECTION: NORMAL

## 2018-08-07 ENCOUNTER — HOSPITAL ENCOUNTER (OUTPATIENT)
Age: 73
Setting detail: SPECIMEN
Discharge: HOME | End: 2018-08-07
Payer: MEDICARE

## 2018-08-07 ENCOUNTER — PROCEDURE VISIT (OUTPATIENT)
Dept: UROLOGY | Age: 73
End: 2018-08-07
Payer: MEDICARE

## 2018-08-07 VITALS — SYSTOLIC BLOOD PRESSURE: 130 MMHG | TEMPERATURE: 98.1 F | HEART RATE: 82 BPM | DIASTOLIC BLOOD PRESSURE: 81 MMHG

## 2018-08-07 DIAGNOSIS — C67.9 MALIGNANT NEOPLASM OF URINARY BLADDER, UNSPECIFIED SITE (HCC): ICD-10-CM

## 2018-08-07 DIAGNOSIS — C67.9 MALIGNANT NEOPLASM OF URINARY BLADDER, UNSPECIFIED SITE (HCC): Primary | ICD-10-CM

## 2018-08-07 PROCEDURE — 52000 CYSTOURETHROSCOPY: CPT | Performed by: UROLOGY

## 2018-08-07 PROCEDURE — 99999 PR OFFICE/OUTPT VISIT,PROCEDURE ONLY: CPT | Performed by: UROLOGY

## 2018-08-07 NOTE — PROGRESS NOTES
Cystoscopy Operative Note (8/7/18): Surgeon: Daren Bauer  Anesthesia: Urethral 2% Xylocaine  Indications: Bladder Cancer  Position: Dorsal Lithotomy    Findings:   Risks and Benefits discussed with patient prior to procedure. The patient was prepped and draped in the usual sterile fashion. The flexible cystoscope was advanced through the urethra and into the bladder. The bladder was thoroughly inspected and the following was noted:    Vagina: normal appearing vagina with normal color and discharge, no lesions  Residual Urine: mild  Urethra: not indicated and normal appearing urethra with no masses, tenderness or lesions  Bladder: No tumors or CIS noted. No bladder diverticulum. There was none trabeculation noted. Ureters: Clear efflux from both ureters. Orifices with normal configuration and location. The cystoscope was removed. The patient tolerated the procedure well. Had CIS in Nov of 2016. Had another biopsy in 4/2017, which was negative. Cysto today shows multiple areas of inflammation, which has been stable. FISH sent, if pos, needs biopsy, if neg, cysto in 3 months.

## 2018-08-14 LAB — UROTHELIAL CANCER DETECTION: NORMAL

## 2018-08-16 DIAGNOSIS — E03.9 ACQUIRED HYPOTHYROIDISM: ICD-10-CM

## 2018-08-16 RX ORDER — LEVOTHYROXINE SODIUM 0.05 MG/1
TABLET ORAL
Qty: 90 TABLET | Refills: 3 | OUTPATIENT
Start: 2018-08-16

## 2018-08-16 RX ORDER — LOSARTAN POTASSIUM 50 MG/1
50 TABLET ORAL DAILY
Qty: 90 TABLET | Refills: 1 | Status: SHIPPED | OUTPATIENT
Start: 2018-08-16 | End: 2018-11-08 | Stop reason: SDUPTHER

## 2018-08-16 RX ORDER — LOSARTAN POTASSIUM 50 MG/1
50 TABLET ORAL DAILY
Qty: 90 TABLET | Refills: 3 | OUTPATIENT
Start: 2018-08-16

## 2018-08-16 RX ORDER — LEVOTHYROXINE SODIUM 0.05 MG/1
TABLET ORAL
Qty: 90 TABLET | Refills: 1 | Status: SHIPPED | OUTPATIENT
Start: 2018-08-16 | End: 2018-11-08 | Stop reason: SDUPTHER

## 2018-08-16 NOTE — TELEPHONE ENCOUNTER
05/07/2018 last in office,  Health Maintenance   Topic Date Due    DTaP/Tdap/Td vaccine (1 - Tdap) 04/07/1962    Shingles Vaccine (1 of 2 - 2 Dose Series) 04/07/1993    Pneumococcal low/med risk (1 of 2 - PCV13) 04/07/2008    TSH testing  01/13/2018    Flu vaccine (1) 09/01/2018    Lipid screen  01/13/2022    Colon cancer screen colonoscopy  03/15/2026    DEXA (modify frequency per FRAX score)  Addressed             (applicable per patient's age: Cancer Screenings, Depression Screening, Fall Risk Screening, Immunizations)    LDL Calculated (mg/dL)   Date Value   01/13/2017 129     BUN (mg/dL)   Date Value   11/09/2016 19      (goal A1C is < 7)   (goal LDL is <100) need 30-50% reduction from baseline     BP Readings from Last 3 Encounters:   08/07/18 130/81   05/07/18 138/80   05/01/18 (!) 155/84    (goal /80)      All Future Testing planned in [facility]:  Lab Frequency Next Occurrence   Basic Metabolic Panel Once 77/[id number]/[id number]   TSH with Reflex Once 08/15/2018       Next Visit Date:  Future Appointments  Date Time Provider Steven Cardoso   11/8/2018 1:00 PM Pricilla Walker MD [facility] Iona Hicks   11/13/2018 9:30 AM Elise Fowler MD [address] [place] [address]            Patient Active Problem List:     Irritable bowel syndrome     GERD (gastroesophageal reflux disease)     Insomnia     Colon polyp     Obesity     Hypertension     DAISY (obstructive sleep apnea)     AK (actinic keratosis)     Hypothyroid     DDD (degenerative disc disease), lumbar     CAD (coronary artery disease)     Neurogenic bladder     Bladder cancer (HCC)     Asymptomatic bilateral carotid artery stenosis     Carotid stenosis     Breast cyst     Dyslipidemia

## 2018-09-27 LAB
BUN BLDV-MCNC: 21 MG/DL
CALCIUM SERPL-MCNC: 8.9 MG/DL
CHLORIDE BLD-SCNC: 104 MMOL/L
CO2: 27 MMOL/L
CREAT SERPL-MCNC: 0.86 MG/DL
GFR CALCULATED: 64
GLUCOSE BLD-MCNC: 92 MG/DL
POTASSIUM SERPL-SCNC: 4.4 MMOL/L
SODIUM BLD-SCNC: 139 MMOL/L
TSH SERPL DL<=0.05 MIU/L-ACNC: 4.92 UIU/ML

## 2018-10-02 DIAGNOSIS — E03.9 ACQUIRED HYPOTHYROIDISM: ICD-10-CM

## 2018-10-02 DIAGNOSIS — I10 ESSENTIAL HYPERTENSION: ICD-10-CM

## 2018-11-08 ENCOUNTER — OFFICE VISIT (OUTPATIENT)
Dept: PRIMARY CARE CLINIC | Age: 73
End: 2018-11-08
Payer: MEDICARE

## 2018-11-08 VITALS
DIASTOLIC BLOOD PRESSURE: 72 MMHG | SYSTOLIC BLOOD PRESSURE: 124 MMHG | HEART RATE: 85 BPM | WEIGHT: 240 LBS | HEIGHT: 64 IN | RESPIRATION RATE: 16 BRPM | BODY MASS INDEX: 40.97 KG/M2

## 2018-11-08 DIAGNOSIS — E03.9 ACQUIRED HYPOTHYROIDISM: Primary | ICD-10-CM

## 2018-11-08 DIAGNOSIS — I10 ESSENTIAL HYPERTENSION: ICD-10-CM

## 2018-11-08 DIAGNOSIS — E66.9 OBESITY, UNSPECIFIED CLASSIFICATION, UNSPECIFIED OBESITY TYPE, UNSPECIFIED WHETHER SERIOUS COMORBIDITY PRESENT: ICD-10-CM

## 2018-11-08 DIAGNOSIS — R10.13 EPIGASTRIC PAIN: ICD-10-CM

## 2018-11-08 PROCEDURE — 4040F PNEUMOC VAC/ADMIN/RCVD: CPT | Performed by: FAMILY MEDICINE

## 2018-11-08 PROCEDURE — 1101F PT FALLS ASSESS-DOCD LE1/YR: CPT | Performed by: FAMILY MEDICINE

## 2018-11-08 PROCEDURE — 99214 OFFICE O/P EST MOD 30 MIN: CPT | Performed by: FAMILY MEDICINE

## 2018-11-08 PROCEDURE — G8484 FLU IMMUNIZE NO ADMIN: HCPCS | Performed by: FAMILY MEDICINE

## 2018-11-08 PROCEDURE — 1036F TOBACCO NON-USER: CPT | Performed by: FAMILY MEDICINE

## 2018-11-08 PROCEDURE — G8427 DOCREV CUR MEDS BY ELIG CLIN: HCPCS | Performed by: FAMILY MEDICINE

## 2018-11-08 PROCEDURE — 1123F ACP DISCUSS/DSCN MKR DOCD: CPT | Performed by: FAMILY MEDICINE

## 2018-11-08 PROCEDURE — G8417 CALC BMI ABV UP PARAM F/U: HCPCS | Performed by: FAMILY MEDICINE

## 2018-11-08 PROCEDURE — G8598 ASA/ANTIPLAT THER USED: HCPCS | Performed by: FAMILY MEDICINE

## 2018-11-08 PROCEDURE — 1090F PRES/ABSN URINE INCON ASSESS: CPT | Performed by: FAMILY MEDICINE

## 2018-11-08 PROCEDURE — G8400 PT W/DXA NO RESULTS DOC: HCPCS | Performed by: FAMILY MEDICINE

## 2018-11-08 PROCEDURE — 3017F COLORECTAL CA SCREEN DOC REV: CPT | Performed by: FAMILY MEDICINE

## 2018-11-08 RX ORDER — LEVOTHYROXINE SODIUM 0.05 MG/1
TABLET ORAL
Qty: 90 TABLET | Refills: 1 | Status: SHIPPED | OUTPATIENT
Start: 2018-11-08 | End: 2019-09-09 | Stop reason: SDUPTHER

## 2018-11-08 RX ORDER — LOSARTAN POTASSIUM 50 MG/1
50 TABLET ORAL DAILY
Qty: 90 TABLET | Refills: 1 | Status: SHIPPED | OUTPATIENT
Start: 2018-11-08 | End: 2019-08-12 | Stop reason: SDUPTHER

## 2018-11-08 ASSESSMENT — PATIENT HEALTH QUESTIONNAIRE - PHQ9
SUM OF ALL RESPONSES TO PHQ9 QUESTIONS 1 & 2: 0
SUM OF ALL RESPONSES TO PHQ QUESTIONS 1-9: 0
1. LITTLE INTEREST OR PLEASURE IN DOING THINGS: 0
2. FEELING DOWN, DEPRESSED OR HOPELESS: 0
SUM OF ALL RESPONSES TO PHQ QUESTIONS 1-9: 0

## 2018-11-08 NOTE — PROGRESS NOTES
groomed, well dressed. The patient maintains appropriate eye contact and does not appear to be responding to internal stimuli. No agitation    Lab Results   Component Value Date    TSH 4.92 09/27/2018         Assessment/ Plan / Medical Decision Making   Diagnosis Orders   1. Acquired hypothyroidism  levothyroxine (SYNTHROID) 50 MCG tablet   2. Essential hypertension  metoprolol tartrate (LOPRESSOR) 25 MG tablet   3. Obesity, unspecified classification, unspecified obesity type, unspecified whether serious comorbidity present         Medications, laboratory testing, imaging, consultation, and follow up as documented in this encounter. Her hypothyroidism is appropriately treated. Continue levothyroxine. Continue current antihypertensive medications. With respect to her epigastric discomfort, follow up with gastroenterology. Regarding her weight, I have recommended therapeutic lifestyle modifications including exercise. Additionally, I have recommended both an influenza and pneumococcal vaccination. She declines. Follow up in our office in the 6 months or as needed. Telma Cabrera received counseling on the following healthy behaviors: medication adherence    Patient given educational materials on exercise. Was a self-tracking handout given in paper form or via [employer]? No  If yes, see orders or list here. Discussed use, benefit, and side effects of prescribed medications. Barriers to medication compliance addressed. All patient questions answered. Pt voiced understanding. This note is created with the assistance of a speech-recognition program.  While intending to generate a document that actually reflects the content of the visit, no guarantees can be provided that every mistake has been identified and corrected by editing.

## 2018-11-12 ENCOUNTER — TELEPHONE (OUTPATIENT)
Dept: PRIMARY CARE CLINIC | Age: 73
End: 2018-11-12

## 2018-11-12 RX ORDER — AZITHROMYCIN 250 MG/1
250 TABLET, FILM COATED ORAL SEE ADMIN INSTRUCTIONS
Qty: 6 TABLET | Refills: 0 | Status: SHIPPED | OUTPATIENT
Start: 2018-11-12 | End: 2018-11-17

## 2018-11-12 RX ORDER — AMOXICILLIN AND CLAVULANATE POTASSIUM 875; 125 MG/1; MG/1
1 TABLET, FILM COATED ORAL 2 TIMES DAILY
Qty: 14 TABLET | Refills: 0 | Status: SHIPPED | OUTPATIENT
Start: 2018-11-12 | End: 2018-11-19

## 2018-11-27 ENCOUNTER — PROCEDURE VISIT (OUTPATIENT)
Dept: UROLOGY | Age: 73
End: 2018-11-27
Payer: MEDICARE

## 2018-11-27 ENCOUNTER — HOSPITAL ENCOUNTER (OUTPATIENT)
Age: 73
Setting detail: SPECIMEN
Discharge: HOME | End: 2018-11-27
Payer: MEDICARE

## 2018-11-27 VITALS
DIASTOLIC BLOOD PRESSURE: 74 MMHG | TEMPERATURE: 98.1 F | SYSTOLIC BLOOD PRESSURE: 124 MMHG | WEIGHT: 240 LBS | HEART RATE: 67 BPM | HEIGHT: 64 IN | BODY MASS INDEX: 40.97 KG/M2

## 2018-11-27 DIAGNOSIS — C67.9 MALIGNANT NEOPLASM OF URINARY BLADDER, UNSPECIFIED SITE (HCC): Primary | ICD-10-CM

## 2018-11-27 DIAGNOSIS — C67.9 MALIGNANT NEOPLASM OF URINARY BLADDER, UNSPECIFIED SITE (HCC): ICD-10-CM

## 2018-11-27 PROCEDURE — 52000 CYSTOURETHROSCOPY: CPT | Performed by: UROLOGY

## 2018-11-27 PROCEDURE — 99999 PR OFFICE/OUTPT VISIT,PROCEDURE ONLY: CPT | Performed by: UROLOGY

## 2018-11-27 RX ORDER — FUROSEMIDE 40 MG/1
20 TABLET ORAL
COMMUNITY
Start: 2018-08-15 | End: 2019-09-09 | Stop reason: SDUPTHER

## 2018-12-05 LAB — UROTHELIAL CANCER DETECTION: NORMAL

## 2018-12-14 ENCOUNTER — TELEPHONE (OUTPATIENT)
Dept: PRIMARY CARE CLINIC | Age: 73
End: 2018-12-14

## 2018-12-28 ENCOUNTER — TELEPHONE (OUTPATIENT)
Dept: PRIMARY CARE CLINIC | Age: 73
End: 2018-12-28

## 2018-12-28 DIAGNOSIS — R05.9 COUGH: Primary | ICD-10-CM

## 2018-12-28 RX ORDER — PREDNISONE 20 MG/1
TABLET ORAL
Qty: 18 TABLET | Refills: 0 | Status: SHIPPED | OUTPATIENT
Start: 2018-12-28 | End: 2019-01-07

## 2019-02-21 PROBLEM — I51.7 LEFT VENTRICULAR HYPERTROPHY: Status: ACTIVE | Noted: 2019-02-21

## 2019-04-29 ENCOUNTER — OFFICE VISIT (OUTPATIENT)
Dept: PRIMARY CARE CLINIC | Age: 74
End: 2019-04-29
Payer: MEDICARE

## 2019-04-29 VITALS
HEIGHT: 64 IN | HEART RATE: 51 BPM | OXYGEN SATURATION: 98 % | DIASTOLIC BLOOD PRESSURE: 84 MMHG | BODY MASS INDEX: 41.32 KG/M2 | SYSTOLIC BLOOD PRESSURE: 122 MMHG | WEIGHT: 242 LBS | RESPIRATION RATE: 16 BRPM

## 2019-04-29 DIAGNOSIS — M99.03 LUMBAR REGION SOMATIC DYSFUNCTION: ICD-10-CM

## 2019-04-29 DIAGNOSIS — M54.50 CHRONIC BILATERAL LOW BACK PAIN WITHOUT SCIATICA: ICD-10-CM

## 2019-04-29 DIAGNOSIS — G89.29 CHRONIC BILATERAL LOW BACK PAIN WITHOUT SCIATICA: ICD-10-CM

## 2019-04-29 DIAGNOSIS — M99.04 SACRAL REGION SOMATIC DYSFUNCTION: ICD-10-CM

## 2019-04-29 PROCEDURE — 1123F ACP DISCUSS/DSCN MKR DOCD: CPT | Performed by: FAMILY MEDICINE

## 2019-04-29 PROCEDURE — 99213 OFFICE O/P EST LOW 20 MIN: CPT | Performed by: FAMILY MEDICINE

## 2019-04-29 PROCEDURE — 4040F PNEUMOC VAC/ADMIN/RCVD: CPT | Performed by: FAMILY MEDICINE

## 2019-04-29 PROCEDURE — G8417 CALC BMI ABV UP PARAM F/U: HCPCS | Performed by: FAMILY MEDICINE

## 2019-04-29 PROCEDURE — G8598 ASA/ANTIPLAT THER USED: HCPCS | Performed by: FAMILY MEDICINE

## 2019-04-29 PROCEDURE — 1036F TOBACCO NON-USER: CPT | Performed by: FAMILY MEDICINE

## 2019-04-29 PROCEDURE — G8427 DOCREV CUR MEDS BY ELIG CLIN: HCPCS | Performed by: FAMILY MEDICINE

## 2019-04-29 PROCEDURE — G8400 PT W/DXA NO RESULTS DOC: HCPCS | Performed by: FAMILY MEDICINE

## 2019-04-29 PROCEDURE — 1090F PRES/ABSN URINE INCON ASSESS: CPT | Performed by: FAMILY MEDICINE

## 2019-04-29 ASSESSMENT — PATIENT HEALTH QUESTIONNAIRE - PHQ9
SUM OF ALL RESPONSES TO PHQ QUESTIONS 1-9: 0
2. FEELING DOWN, DEPRESSED OR HOPELESS: 0
1. LITTLE INTEREST OR PLEASURE IN DOING THINGS: 0
SUM OF ALL RESPONSES TO PHQ QUESTIONS 1-9: 0
SUM OF ALL RESPONSES TO PHQ9 QUESTIONS 1 & 2: 0

## 2019-04-29 ASSESSMENT — ENCOUNTER SYMPTOMS
NAUSEA: 0
VOMITING: 0
BACK PAIN: 1

## 2019-04-29 NOTE — PROGRESS NOTES
[facility]  [address] [place] [address] [address]  [place] [address]  Dept: [phone]  Dept Fax: [phone]    Sera West is a 66 y.o. female who presents today for her medical conditions/complaints as noted below. Sera West is c/o of  Chief Complaint   Patient presents with    Back Pain         HPI:     HPI     Pt has left knee pain and will be seeing orthopedics tomorrow for possible knee injection. States she noticed her low back pain has flared up more lately due to her knee pain. Many years ago got manipulation for her low back pain and it helped. States walking and carrying something while walking makes it worse. Has been sleeping on floor and that has helped. Denies taking any medication for it. No bowel or bladder incontinence or saddle anesthesia, denies any numbness or weakness of LE. No results found for: LABA1C          ( goal A1C is < 7)   No results found for: LABMICR  LDL Calculated (mg/dL)   Date Value   01/13/2017 129   10/06/2015 83   08/20/2014 66       (goal LDL is <100)   BUN (mg/dL)   Date Value   09/27/2018 21     BP Readings from Last 3 Encounters:   04/29/19 122/84   11/27/18 124/74   11/08/18 124/72          (goal 120/80)    Past Medical History:   Diagnosis Date    AK (actinic keratosis)     Anesthesia     HARD TIME WAKING UP \"SOMETIMES\".  Asymptomatic bilateral carotid artery stenosis 3/10/2015    Bladder cancer (HCC)     bladder, first time 1993    CAD (coronary artery disease)     Colon polyp     Dr Linda Doyle Difficult intubation     SMALL MOUTH AND AIRWAY    GERD (gastroesophageal reflux disease)     Hypertension     Hypothyroid     Obesity     DAISY (obstructive sleep apnea)     NO MACHINE , she does not believe she has sleep apnea    Stress incontinence     Wears glasses     FOR READING.       Past Surgical History:   Procedure Laterality Date    BLADDER SURGERY  8/1993 and 10/2002    for bladder respiratory distress. Musculoskeletal:   Neg straight leg raise bl. Mild ttp of L5 paraspinal muscles and piriformis/ sacrum bl. Neurological: She is alert and oriented to person, place, and time. Skin: Skin is warm and dry. She is not diaphoretic. Psychiatric: She has a normal mood and affect. Her behavior is normal.     /84 (Site: Left Lower Arm, Position: Sitting, Cuff Size: Large Adult)   Pulse 51   Resp 16   Ht 5' 4\" (1.626 m)   Wt 242 lb (109.8 kg)   SpO2 98%   Breastfeeding? No   BMI 41.54 kg/m²     Assessment:      1. Chronic bilateral low back pain without sciatica  - soft tissue technique done of lower lumbar region and stretches provided for piriformis muscle. Pt can take nsaids as well for pain if needed and to continue home stretches that she has done. 2. Sacral region somatic dysfunction  - soft tissue, myofacial release done. Some stretches also shown for piriformis muscle. 3. Lumbar region somatic dysfunction  - as noted above. Plan:      Return if symptoms worsen or fail to improve. No orders of the defined types were placed in this encounter. No orders of the defined types were placed in this encounter.             Electronically signed by Reji Maria DO on 4/29/2019 at 8:21 PM

## 2019-05-09 ENCOUNTER — OFFICE VISIT (OUTPATIENT)
Dept: PRIMARY CARE CLINIC | Age: 74
End: 2019-05-09
Payer: MEDICARE

## 2019-05-09 VITALS
HEART RATE: 71 BPM | WEIGHT: 243 LBS | DIASTOLIC BLOOD PRESSURE: 72 MMHG | BODY MASS INDEX: 41.71 KG/M2 | RESPIRATION RATE: 16 BRPM | SYSTOLIC BLOOD PRESSURE: 118 MMHG

## 2019-05-09 DIAGNOSIS — M19.90 ARTHRITIS: Primary | ICD-10-CM

## 2019-05-09 DIAGNOSIS — F51.01 PRIMARY INSOMNIA: ICD-10-CM

## 2019-05-09 DIAGNOSIS — N31.9 NEUROGENIC BLADDER: ICD-10-CM

## 2019-05-09 DIAGNOSIS — K21.9 GASTROESOPHAGEAL REFLUX DISEASE, ESOPHAGITIS PRESENCE NOT SPECIFIED: ICD-10-CM

## 2019-05-09 PROCEDURE — G8417 CALC BMI ABV UP PARAM F/U: HCPCS | Performed by: FAMILY MEDICINE

## 2019-05-09 PROCEDURE — 1036F TOBACCO NON-USER: CPT | Performed by: FAMILY MEDICINE

## 2019-05-09 PROCEDURE — 99214 OFFICE O/P EST MOD 30 MIN: CPT | Performed by: FAMILY MEDICINE

## 2019-05-09 PROCEDURE — 1123F ACP DISCUSS/DSCN MKR DOCD: CPT | Performed by: FAMILY MEDICINE

## 2019-05-09 PROCEDURE — G8400 PT W/DXA NO RESULTS DOC: HCPCS | Performed by: FAMILY MEDICINE

## 2019-05-09 PROCEDURE — 4040F PNEUMOC VAC/ADMIN/RCVD: CPT | Performed by: FAMILY MEDICINE

## 2019-05-09 PROCEDURE — 1090F PRES/ABSN URINE INCON ASSESS: CPT | Performed by: FAMILY MEDICINE

## 2019-05-09 PROCEDURE — G8598 ASA/ANTIPLAT THER USED: HCPCS | Performed by: FAMILY MEDICINE

## 2019-05-09 PROCEDURE — G8427 DOCREV CUR MEDS BY ELIG CLIN: HCPCS | Performed by: FAMILY MEDICINE

## 2019-05-09 RX ORDER — ZOLPIDEM TARTRATE 5 MG/1
5 TABLET ORAL NIGHTLY PRN
Qty: 30 TABLET | Refills: 1 | Status: SHIPPED | OUTPATIENT
Start: 2019-05-09 | End: 2019-05-28 | Stop reason: ALTCHOICE

## 2019-05-09 RX ORDER — RANITIDINE 150 MG/1
150 TABLET ORAL 2 TIMES DAILY
Qty: 60 TABLET | Refills: 3 | Status: SHIPPED | OUTPATIENT
Start: 2019-05-09 | End: 2019-05-28 | Stop reason: ALTCHOICE

## 2019-05-09 NOTE — PROGRESS NOTES
Subjective: Kyler Jiménez is in for continued evaluation and management. Her chronic medical problems include the following; neurogenic bladder, acid reflux, and insomnia. She is also complaining of arthritic pain. She has neurogenic bladder. She is currently on oxybutynin. She indicates that this helps with her symptoms. She has acid reflux. She is currently on omeprazole. She indicates that her symptoms are not as well controlled. I have recommended the addition of ranitidine. She has insomnia. She uses zolpidem on an as-needed basis. She denies any adverse effects associated with use the medication. Potential adverse effects of the medication were discussed with her. She indicates that she has been evaluated by orthopedic surgery. She's received cortisone injections in her knees. She is interested in evaluation for treatment with stem cells. Review of systems per HPI, otherwise negative. Allergies; medications; past medical, surgical, family, and social history; and problem list reviewed as indicated in this encounter. Objective:  Vitals:  Blood pressure 118/72, pulse 71, resp. rate 16, weight 243 lb (110.2 kg), not currently breastfeeding. Constitutional: She is oriented to person, place, and time. She appears well-developed and well-nourished and in no acute distress. Cardiovascular: Normal rate and regular rhythm, no murmur, rub, or gallop    Pulmonary/Chest: Effort normal and breath sounds normal. No rales or wheezes. No chest retraction. Extremities: no clubbing, cyanosis, or edema  Neurological:  CN II - XII grossly intact; no focal neurological deficits  Psychiatric:  Well groomed, well dressed. The patient maintains appropriate eye contact and does not appear to be responding to internal stimuli. No agitation      Assessment/ Plan / Medical Decision Making   Diagnosis Orders   1. Arthritis  AFL - Rupert Sharp MD, Orthopedic Surgery, [place]   2.  Gastroesophageal reflux disease, esophagitis presence not specified  ranitidine (ZANTAC) 150 MG tablet   3. Primary insomnia  zolpidem (AMBIEN) 5 MG tablet   4. Neurogenic bladder         Medications, laboratory testing, imaging, consultation, and follow up as documented in this encounter. Refer to Dr. Fausto Francis for evaluation of stem cell therapy. Continue omeprazole for acid reflux. Add ranitidine. Continue judicious intermittent use of zolpidem for insomnia. Continue oxybutynin for neurogenic bladder. Follow up with urology. Follow up in our office in approximately 6 months or as needed. Jayden Bones received counseling on the following healthy behaviors: medication adherence    Patient given educational materials on zolpidem. Was a self-tracking handout given in paper form or via [employer]? No  If yes, see orders or list here. Discussed use, benefit, and side effects of prescribed medications. Barriers to medication compliance addressed. All patient questions answered. Pt voiced understanding. This note is created with the assistance of a speech-recognition program.  While intending to generate a document that actually reflects the content of the visit, no guarantees can be provided that every mistake has been identified and corrected by editing.

## 2019-05-09 NOTE — PROGRESS NOTES
HYPERTENSION visit     BP Readings from Last 3 Encounters:   04/29/19 122/84   11/27/18 124/74   11/08/18 124/72       LDL Calculated (mg/dL)   Date Value   01/13/2017 129     HDL (mg/dL)   Date Value   01/13/2017 63     BUN (mg/dL)   Date Value   09/27/2018 21     CREATININE (no units)   Date Value   09/27/2018 0.86     Glucose (mg/dL)   Date Value   09/27/2018 92              Have you changed or started any medications since your last visit including any over-the-counter medicines, vitamins, or herbal medicines? no   Have you stopped taking any of your medications? Is so, why? -  no  Are you having any side effects from any of your medications? - no  How often do you miss doses of your medication? no      Have you seen any other physician or provider since your last visit?  no   Have you had any other diagnostic tests since your last visit?  no   Have you been seen in the emergency room and/or had an admission in a hospital since we last saw you?  no   Have you had your routine dental cleaning in the past 6 months?  yes      Do you have an active MyChart account? If no, what is the barrier?   Yes    Patient Care Team:  Gautam Dimas MD as PCP - General (Family Medicine)  Gautam Dimas MD as PCP - S Attributed Provider  Cherryl Ganser, MD as Consulting Physician (Ophthalmology)  Neli Durham MD as Consulting Physician  Keyon Acosta DO as Consulting Physician (Cardiology)  Ting Smalls as Consulting Physician (Gastroenterology)  George Bailey MD as Surgeon (General Surgery)  Lori Vazquez MD as Surgeon (Vascular Surgery)  Jeison Denny MD as Consulting Physician (Urology)    Medical History Review  Past Medical, Family, and Social History reviewed and does contribute to the patient presenting condition    Health Maintenance   Topic Date Due    DTaP/Tdap/Td vaccine (1 - Tdap) 04/07/1962    Shingles Vaccine (1 of 2) 04/07/1993    Pneumococcal 65+ years Vaccine (1 of 2 - PCV13) 04/07/2008    Flu vaccine (Season Ended) 09/01/2019    TSH testing  09/27/2019    Potassium monitoring  09/27/2019    Creatinine monitoring  09/27/2019    DEXA (modify frequency per FRAX score)  Addressed

## 2019-05-09 NOTE — PATIENT INSTRUCTIONS
Patient Education        zolpidem  Pronunciation:  zole PI dem  Brand:  Ambien, Ambien CR, Edluar, Intermezzo, Zolpimist  What is the most important information I should know about zolpidem? Zolpidem may cause a severe allergic reaction. Stop taking zolpidem and get emergency medical help if you have any of these signs of an allergic reaction: hives; difficulty breathing; swelling of your face, lips, tongue, or throat. Never take zolpidem in larger amounts or for longer than prescribed. Do not take zolpidem if you have consumed alcohol during the day or just before bed. Some people using this medicine have engaged in activity such as driving, eating, walking, making phone calls, or having sex and later having no memory of the activity. What is zolpidem? Zolpidem is used to treat insomnia. The immediate-release forms of zolpidem are Ambien, Intermezzo,Edluar, and Zolpimist, which are used to help you fall asleep. The extended-release form of zolpidem is Ambien CR, which has a first layer that dissolves quickly to help you fall asleep, and a second layer that dissolves slowly to help you stay asleep. Ambien, Edluar, and Zolpimist are used to help you fall asleep when you first go to bed. Intermezzo, is used to help you fall back to sleep if you wake up in the middle of the night and then have trouble sleeping. Your doctor will determine which form of zolpidem is best for you. Zolpidem may also be used for purposes not listed in this medication guide. What should I discuss with my healthcare provider before taking zolpidem? Some people using this medicine have engaged in activity such as driving, eating, walking, making phone calls, or having sex and later having no memory of the activity. If this happens to you, stop taking zolpidem and talk with your doctor about another treatment for your sleep disorder. You should not use this medicine if you are allergic to zolpidem.  Zolpidem tablets may contain lactose. Use caution if you are sensitive to lactose. Zolpidem is not approved for use by anyone younger than 25years old. Tell your doctor if you have ever had:  · depression, mental illness, or suicidal thoughts;  · drug or alcohol addiction;  · lung disease or breathing problems;  · sleep apnea (breathing stops during sleep); or  · liver or kidney disease. Taking zolpidem in the last 3 months of pregnancy may cause drowsiness or breathing problems in your . It may not be safe to breast-feed while using this medicine. Ask your doctor about any risk. How should I take zolpidem? The recommended doses of zolpidem are not the same in men and women, and this drug is not approved for use in children. Follow the directions on your prescription label and read all medication guides. Never use zolpidem in larger amounts, or for longer than prescribed. Tell your doctor if you feel an increased urge to take more of this medicine. Zolpidem may be habit-forming. Misuse can cause addiction, overdose, or death. Selling or giving away this medicine is against the law. Read and carefully follow any Instructions for Use provided with your medicine. Ask your doctor or pharmacist if you do not understand these instructions. Never take Ambien, Edluar, or Zolpimist if you do not have a full 7 to 8 hours to sleep before being active again. Do not take Intermezzo for middle-of-the-night insomnia unless you have 4 hours of sleep time left before being active. Zolpidem is for short-term use only. Tell your doctor if your insomnia symptoms do not improve, or if they get worse after using this medication for 7 to 10 nights in a row. Store at room temperature away from moisture and heat. Do not freeze. Keep the medication in a place where others cannot get to it. Keep the Zolpimist  bottle upright when not in use. Do not stop using zolpidem suddenly after long-term use, or you could have unpleasant withdrawal symptoms. Ask your doctor how to safely stop using this medicine. Insomnia symptoms may also return after you stop taking zolpidem, and may be even worse than before. Call your doctor if you still have worsened insomnia after the first few nights without taking zolpidem. What happens if I miss a dose? Since zolpidem is taken only at bedtime if needed, you are not likely to miss a dose. What happens if I overdose? Seek emergency medical attention or call the Poison Help line at 1-284.708.7532. An overdose of zolpidem can be fatal, especially when it is taken together with other medications that can cause drowsiness. Overdose symptoms may include sleepiness, confusion, shallow breathing, feeling light-headed, fainting, or coma. What should I avoid while taking zolpidem? Avoid taking zolpidem during travel, such as to sleep on an airplane. You may be awakened before the effects of the medicine have worn off. Amnesia (forgetfulness) is more common if you do not get a full 7 to 8 hours of sleep after taking zolpidem. Avoid driving or hazardous activity until you know how this medicine will affect you. You may still feel sleepy the morning after taking zolpidem, and your reactions could be impaired. Wait until you are fully awake before you drive, operate machinery,  an airplane, or do anything that requires you to be awake and alert. Do not take this medicine if you have consumed alcohol during the day or just before bed. What are the possible side effects of zolpidem? Zolpidem may cause a severe allergic reaction. Stop taking zolpidem and get emergency medical help if you have signs of an allergic reaction: hives; difficulty breathing; swelling of your face, lips, tongue, or throat.   Report any new or worsening symptoms to your doctor, such as: depression, anxiety, aggression, agitation, confusion, unusual thoughts, hallucinations, memory problems, changes in personality, risk-taking behavior, decreased sensitive. [facility] information has been compiled for use by healthcare practitioners and consumers in the [facility] and therefore [facility] does not warrant that uses outside of the [facility] are appropriate, unless specifically indicated otherwise. [facility]'s drug information does not endorse drugs, diagnose patients or recommend therapy. [facility]'s drug information is an informational resource designed to assist licensed healthcare practitioners in caring for their patients and/or to serve consumers viewing this service as a supplement to, and not a substitute for, the expertise, skill, knowledge and judgment of healthcare practitioners. The absence of a warning for a given drug or drug combination in no way should be construed to indicate that the drug or drug combination is safe, effective or appropriate for any given patient. [facility] does not assume any responsibility for any aspect of healthcare administered with the aid of information [facility] provides. The information contained herein is not intended to cover all possible uses, directions, precautions, warnings, drug interactions, allergic reactions, or adverse effects. If you have questions about the drugs you are taking, check with your doctor, nurse or pharmacist.  Copyright 4736-8076 [address]: 12.01. Revision date: 10/16/2018. Care instructions adapted under license by [facility] ([facility]). If you have questions about a medical condition or this instruction, always ask your healthcare professional. [place] [address] any warranty or liability for your use of this information. Home

## 2019-05-28 ENCOUNTER — HOSPITAL ENCOUNTER (OUTPATIENT)
Age: 74
Setting detail: SPECIMEN
Discharge: HOME | End: 2019-05-28
Payer: MEDICARE

## 2019-05-28 ENCOUNTER — PROCEDURE VISIT (OUTPATIENT)
Dept: UROLOGY | Age: 74
End: 2019-05-28
Payer: MEDICARE

## 2019-05-28 VITALS
HEART RATE: 67 BPM | HEIGHT: 64 IN | BODY MASS INDEX: 41.32 KG/M2 | SYSTOLIC BLOOD PRESSURE: 139 MMHG | TEMPERATURE: 98.1 F | DIASTOLIC BLOOD PRESSURE: 71 MMHG | WEIGHT: 242 LBS

## 2019-05-28 DIAGNOSIS — C67.8 MALIGNANT NEOPLASM OF OVERLAPPING SITES OF BLADDER (HCC): Primary | ICD-10-CM

## 2019-05-28 DIAGNOSIS — L30.4 INTERTRIGO: ICD-10-CM

## 2019-05-28 PROCEDURE — 52000 CYSTOURETHROSCOPY: CPT | Performed by: UROLOGY

## 2019-05-28 RX ORDER — CLOTRIMAZOLE AND BETAMETHASONE DIPROPIONATE 10; .64 MG/G; MG/G
CREAM TOPICAL
Qty: 45 G | Refills: 1 | Status: SHIPPED | OUTPATIENT
Start: 2019-05-28

## 2019-05-28 NOTE — PROGRESS NOTES
Cystoscopy Operative Note (5/28/19):  Surgeon: Lorri Navarro MD  Anesthesia: Urethral 2% Xylocaine  Indications: bladder cancer  Position: Dorsal Lithotomy    Findings:   Risks and Benefits discussed with patient prior to procedure. The patient was prepped and draped in the usual sterile fashion. The flexible cystoscope was advanced through the urethra and into the bladder. The bladder was thoroughly inspected and the following was noted:    Vagina: normal appearing vagina with normal color and discharge, no lesions  Residual Urine: mild  Urethra: not indicated and normal appearing urethra with no masses, tenderness or lesions  Bladder: No tumors or CIS noted. No bladder diverticulum. There was mild trabeculation noted. Ureters: Clear efflux from both ureters. Orifices with normal configuration and location. The cystoscope was removed. The patient tolerated the procedure well. Agree with the ROS entered by the MA. Marked chronic cystitis noted. Biopsy was negative in 2017. Needs urodynamics to better eval incontinence.

## 2019-06-05 LAB — UROTHELIAL CANCER DETECTION: NORMAL

## 2019-06-12 ENCOUNTER — PROCEDURE VISIT (OUTPATIENT)
Dept: UROLOGY | Age: 74
End: 2019-06-12
Payer: MEDICARE

## 2019-06-12 VITALS
WEIGHT: 242 LBS | BODY MASS INDEX: 41.32 KG/M2 | HEART RATE: 58 BPM | TEMPERATURE: 97.6 F | SYSTOLIC BLOOD PRESSURE: 149 MMHG | DIASTOLIC BLOOD PRESSURE: 72 MMHG | HEIGHT: 64 IN

## 2019-06-12 DIAGNOSIS — C67.9 MALIGNANT NEOPLASM OF URINARY BLADDER, UNSPECIFIED SITE (HCC): ICD-10-CM

## 2019-06-12 DIAGNOSIS — N39.46 MIXED INCONTINENCE URGE AND STRESS: Primary | ICD-10-CM

## 2019-06-12 PROCEDURE — 51784 ANAL/URINARY MUSCLE STUDY: CPT | Performed by: UROLOGY

## 2019-06-12 PROCEDURE — 51728 CYSTOMETROGRAM W/VP: CPT | Performed by: UROLOGY

## 2019-06-12 PROCEDURE — 51741 ELECTRO-UROFLOWMETRY FIRST: CPT | Performed by: UROLOGY

## 2019-06-12 PROCEDURE — 51797 INTRAABDOMINAL PRESSURE TEST: CPT | Performed by: UROLOGY

## 2019-06-12 RX ORDER — ZOLPIDEM TARTRATE 5 MG/1
TABLET ORAL NIGHTLY PRN
COMMUNITY
Start: 2019-06-07 | End: 2021-09-01

## 2019-06-12 NOTE — PROGRESS NOTES
Here for Urodynamic testing. She has mixed incontinence, urge and with standing stress. 3 PPD- sometimes significant urine leak. She is currently on Oxybutynin which she feels is not helping. Has Hx of hyst- pelvic >10 yrs ago. Has chronic diarrhea and uses Imodium routinely. Hx     Uroflow: had stool and urinated some jus prior to testing. Voided 28 ml, average and max flow 4 ml/sec, PVR 50 ml. PE: cystocele and rectocele    Urethral and vaginal cath placed. EMG patches placed perirectally. 1st fill:     Fill rate: 50 ml/min  1st sensation: 58 ml  1st desire: 81 ml  Stress at 100 ml with no leak. Strong desire: 122 ml, at 135 ml in had  Immediate uncontrollable loss of urine. Voided 158 ml, max flow 19 ml/sec, average 8 ml/sec. 2nd fill:   Rate: 50 ml/min  1st sensation: 63 ml  1st desire: 84 ml  After reporting 1st desire she immediately had 41 ml incontinence she could not stop. Max flow 10 ml/sec, average-11ml/sec, PVR 46 ml. After caths removed dressed and voided a small amount more urine. Will review testing with  [address] [facility] [address] [facility]. 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

## 2019-08-12 ENCOUNTER — OFFICE VISIT (OUTPATIENT)
Dept: PRIMARY CARE CLINIC | Age: 74
End: 2019-08-12
Payer: MEDICARE

## 2019-08-12 VITALS
SYSTOLIC BLOOD PRESSURE: 150 MMHG | RESPIRATION RATE: 16 BRPM | DIASTOLIC BLOOD PRESSURE: 84 MMHG | BODY MASS INDEX: 39.89 KG/M2 | OXYGEN SATURATION: 98 % | WEIGHT: 232.4 LBS | HEART RATE: 65 BPM

## 2019-08-12 DIAGNOSIS — I10 ESSENTIAL HYPERTENSION: ICD-10-CM

## 2019-08-12 DIAGNOSIS — I25.10 CORONARY ARTERY DISEASE INVOLVING NATIVE CORONARY ARTERY OF NATIVE HEART WITHOUT ANGINA PECTORIS: ICD-10-CM

## 2019-08-12 DIAGNOSIS — M17.0 PRIMARY OSTEOARTHRITIS OF BOTH KNEES: ICD-10-CM

## 2019-08-12 DIAGNOSIS — Z90.49 S/P LAPAROSCOPIC CHOLECYSTECTOMY: Primary | ICD-10-CM

## 2019-08-12 DIAGNOSIS — G47.33 OSA (OBSTRUCTIVE SLEEP APNEA): ICD-10-CM

## 2019-08-12 DIAGNOSIS — E66.01 CLASS 2 SEVERE OBESITY WITH SERIOUS COMORBIDITY AND BODY MASS INDEX (BMI) OF 39.0 TO 39.9 IN ADULT, UNSPECIFIED OBESITY TYPE (HCC): ICD-10-CM

## 2019-08-12 DIAGNOSIS — M51.36 DDD (DEGENERATIVE DISC DISEASE), LUMBAR: ICD-10-CM

## 2019-08-12 PROCEDURE — 99496 TRANSJ CARE MGMT HIGH F2F 7D: CPT | Performed by: INTERNAL MEDICINE

## 2019-08-12 RX ORDER — AMLODIPINE BESYLATE 5 MG/1
5 TABLET ORAL DAILY
Qty: 60 TABLET | Refills: 2 | Status: SHIPPED | OUTPATIENT
Start: 2019-08-12 | End: 2019-09-09 | Stop reason: ALTCHOICE

## 2019-08-12 RX ORDER — PANTOPRAZOLE SODIUM 40 MG/1
40 TABLET, DELAYED RELEASE ORAL DAILY
COMMUNITY
End: 2019-08-12 | Stop reason: SINTOL

## 2019-08-12 RX ORDER — LOSARTAN POTASSIUM 100 MG/1
100 TABLET ORAL DAILY
Qty: 60 TABLET | Refills: 1 | Status: SHIPPED | OUTPATIENT
Start: 2019-08-12 | End: 2019-12-07 | Stop reason: SDUPTHER

## 2019-08-12 ASSESSMENT — ENCOUNTER SYMPTOMS
CONSTIPATION: 0
SINUS PAIN: 0
VOMITING: 0
ABDOMINAL PAIN: 0
ABDOMINAL DISTENTION: 0
WHEEZING: 0
BACK PAIN: 0
SINUS PRESSURE: 0
DIARRHEA: 0
NAUSEA: 0
COUGH: 0
SHORTNESS OF BREATH: 0

## 2019-08-12 NOTE — PROGRESS NOTES
[facility]"" PRIMARY CARE  [facility] [address]  [address] [address]  Dept: [phone]  Dept Fax: [phone]    Douglas Galo is a 66 y.o. female who presents today for her medical conditions/complaints as noted below. Chief Complaint   Patient presents with    New Patient     wants to discuss BP, discharged from [facility] on 8/9/19       HPI:     This is a 66-year-old female who is here to establish care. She has past medical history of laparoscopic cholecystectomy and got recently discharged from hospital.  She had an episode of acute cholecystitis. Surgery was done by Dr. Ana Mandujano. She has a essential hypertension, GERD, insomnia, degenerative disc disease and lumbar deviation and bilateral knee pain due to arthritis, dyslipidemia. She is on Cozaar imdur for essential hypertension. She is on Lasix 20 mg daily as well. She is on Ditropan for urinary incontinence and it has been helping her. She is on Synthroid 50 mcg daily for hyper thyroidism. Blood pressure today is uncontrolled 150/84. No results found for: LABA1C          ( goal A1C is < 7)   No results found for: LABMICR  LDL Calculated (mg/dL)   Date Value   01/13/2017 129   10/06/2015 83   08/20/2014 66       (goal LDL is <100)   BUN (mg/dL)   Date Value   09/27/2018 21     BP Readings from Last 3 Encounters:   08/12/19 (!) 150/84   06/12/19 (!) 149/72   05/28/19 139/71          (goal 120/80)    Past Medical History:   Diagnosis Date    A-fib ([address]     AK (actinic keratosis)     Anesthesia     HARD TIME WAKING UP \"SOMETIMES\".     Arthritis     Asymptomatic bilateral carotid artery stenosis 3/10/2015    Bladder cancer (HCC)     bladder, first time 44    CAD (coronary artery disease)     Colon polyp     Dr Susan Pimentel Difficult intubation     SMALL MOUTH AND AIRWAY    GERD (gastroesophageal reflux disease)     Hypertension     Hypothyroid     Obesity     DAISY  levothyroxine (SYNTHROID) 50 MCG tablet TAKE ONE TABLET BY MOUTH DAILY 90 tablet 1    metoprolol tartrate (LOPRESSOR) 25 MG tablet Take 1 tablet by mouth 2 times daily 180 tablet 3    isosorbide mononitrate (IMDUR) 30 MG CR tablet Take 30 mg by mouth daily      aspirin 81 MG EC tablet Take 81 mg by mouth daily      nitroGLYCERIN (NITROSTAT) 0.4 MG SL tablet Place 0.4 mg under the tongue every 5 minutes as needed for Chest pain.  Ibuprofen-diphenhydrAMINE HCl (ADVIL PM) 200-25 MG CAPS Take by mouth nightly as needed      Multiple Vitamins-Minerals (THERAPEUTIC MULTIVITAMIN-MINERALS) tablet Take 1 tablet by mouth daily. No current facility-administered medications for this visit. Allergies   Allergen Reactions    Tobramycin Swelling    Atorvastatin Other (See Comments)    Other      Penstrept which ws taken off market in 1960s, Penicillin and Streptomycin       Health Maintenance   Topic Date Due    Shingles Vaccine (1 of 2) 04/07/1993    Annual Wellness Visit (AWV)  04/07/2006    DTaP/Tdap/Td vaccine (1 - Tdap) 08/12/2020 (Originally 4/7/1964)    Pneumococcal 65+ years Vaccine (1 of 2 - PCV13) 08/12/2020 (Originally 4/7/2008)    Flu vaccine (1) 09/01/2019    TSH testing  09/27/2019    Potassium monitoring  09/27/2019    Creatinine monitoring  09/27/2019    DEXA (modify frequency per FRAX score)  Addressed       Subjective:      Review of Systems   Constitutional: Negative for activity change, appetite change, chills, fatigue and fever. HENT: Negative for congestion, ear pain, hearing loss, nosebleeds, sinus pressure, sinus pain and sneezing. Eyes: Negative for visual disturbance. Respiratory: Negative for cough, shortness of breath and wheezing. Cardiovascular: Negative for chest pain and palpitations. Gastrointestinal: Negative for abdominal distention, abdominal pain, constipation, diarrhea, nausea and vomiting.    Endocrine: Negative for cold intolerance, heat intolerance, polydipsia, polyphagia and polyuria. Genitourinary: Negative for difficulty urinating, menstrual problem, vaginal bleeding and vaginal discharge. Musculoskeletal: Positive for arthralgias (b/l knee pain ). Negative for back pain and joint swelling. Skin: Negative for rash. Neurological: Negative for numbness and headaches. Psychiatric/Behavioral: Negative for sleep disturbance. The patient is not nervous/anxious. All other systems reviewed and are negative. Objective:     Physical Exam   Constitutional: She is oriented to person, place, and time. She appears well-developed and well-nourished. She is active. No distress. HENT:   Head: Normocephalic and atraumatic. Right Ear: Hearing normal.   Left Ear: Hearing normal.   Mouth/Throat: Uvula is midline, oropharynx is clear and moist and mucous membranes are normal.   Eyes: Pupils are equal, round, and reactive to light. Conjunctivae are normal. No scleral icterus. Neck: Normal range of motion, full passive range of motion without pain and phonation normal. Neck supple. No JVD present. No thyroid mass and no thyromegaly present. Cardiovascular: Normal rate, regular rhythm, normal heart sounds and intact distal pulses. Exam reveals no decreased pulses. No murmur heard. Pulses:       Carotid pulses are 2+ on the right side, and 2+ on the left side. Radial pulses are 2+ on the right side, and 2+ on the left side. Pulmonary/Chest: Effort normal and breath sounds normal. No accessory muscle usage. No apnea. No respiratory distress. She has no wheezes. She has no rales. Abdominal: Soft. Bowel sounds are normal. She exhibits no distension. There is no tenderness. Musculoskeletal: Normal range of motion. She exhibits no edema or deformity. Lymphadenopathy:     She has no cervical adenopathy. Neurological: She is alert and oriented to person, place, and time. She displays normal reflexes. Skin: Skin is warm and intact.

## 2019-08-19 ENCOUNTER — TELEPHONE (OUTPATIENT)
Dept: PRIMARY CARE CLINIC | Age: 74
End: 2019-08-19

## 2019-09-09 ENCOUNTER — OFFICE VISIT (OUTPATIENT)
Dept: PRIMARY CARE CLINIC | Age: 74
End: 2019-09-09
Payer: MEDICARE

## 2019-09-09 VITALS
DIASTOLIC BLOOD PRESSURE: 60 MMHG | HEART RATE: 79 BPM | OXYGEN SATURATION: 99 % | RESPIRATION RATE: 18 BRPM | BODY MASS INDEX: 40.68 KG/M2 | SYSTOLIC BLOOD PRESSURE: 120 MMHG | HEIGHT: 63 IN | WEIGHT: 229.6 LBS

## 2019-09-09 DIAGNOSIS — R60.0 PEDAL EDEMA: ICD-10-CM

## 2019-09-09 DIAGNOSIS — I10 ESSENTIAL HYPERTENSION: Primary | ICD-10-CM

## 2019-09-09 DIAGNOSIS — E03.9 ACQUIRED HYPOTHYROIDISM: ICD-10-CM

## 2019-09-09 DIAGNOSIS — Z00.00 ROUTINE GENERAL MEDICAL EXAMINATION AT A HEALTH CARE FACILITY: ICD-10-CM

## 2019-09-09 PROCEDURE — 1123F ACP DISCUSS/DSCN MKR DOCD: CPT | Performed by: INTERNAL MEDICINE

## 2019-09-09 PROCEDURE — G0438 PPPS, INITIAL VISIT: HCPCS | Performed by: INTERNAL MEDICINE

## 2019-09-09 PROCEDURE — 4040F PNEUMOC VAC/ADMIN/RCVD: CPT | Performed by: INTERNAL MEDICINE

## 2019-09-09 PROCEDURE — G8598 ASA/ANTIPLAT THER USED: HCPCS | Performed by: INTERNAL MEDICINE

## 2019-09-09 RX ORDER — AMLODIPINE BESYLATE 10 MG/1
10 TABLET ORAL PRN
COMMUNITY
Start: 2019-08-28 | End: 2020-11-06 | Stop reason: ALTCHOICE

## 2019-09-09 RX ORDER — PANTOPRAZOLE SODIUM 40 MG/1
40 TABLET, DELAYED RELEASE ORAL DAILY
COMMUNITY
Start: 2019-08-19

## 2019-09-09 RX ORDER — FUROSEMIDE 20 MG/1
20 TABLET ORAL DAILY PRN
Qty: 60 TABLET | Refills: 0 | Status: SHIPPED | OUTPATIENT
Start: 2019-09-09 | End: 2019-11-09 | Stop reason: SDUPTHER

## 2019-09-09 RX ORDER — LEVOTHYROXINE SODIUM 0.05 MG/1
TABLET ORAL
Qty: 90 TABLET | Refills: 1 | Status: SHIPPED | OUTPATIENT
Start: 2019-09-09 | End: 2020-05-13 | Stop reason: SDUPTHER

## 2019-09-09 ASSESSMENT — PATIENT HEALTH QUESTIONNAIRE - PHQ9
SUM OF ALL RESPONSES TO PHQ QUESTIONS 1-9: 0
SUM OF ALL RESPONSES TO PHQ QUESTIONS 1-9: 0

## 2019-09-09 NOTE — PATIENT INSTRUCTIONS
Personalized Preventive Plan for Octavio Salas - 9/9/2019  Medicare offers a range of preventive health benefits. Some of the tests and screenings are paid in full while other may be subject to a deductible, co-insurance, and/or copay. Some of these benefits include a comprehensive review of your medical history including lifestyle, illnesses that may run in your family, and various assessments and screenings as appropriate. After reviewing your medical record and screening and assessments performed today your provider may have ordered immunizations, labs, imaging, and/or referrals for you. A list of these orders (if applicable) as well as your Preventive Care list are included within your After Visit Summary for your review. Other Preventive Recommendations:    · A preventive eye exam performed by an eye specialist is recommended every 1-2 years to screen for glaucoma; cataracts, macular degeneration, and other eye disorders. · A preventive dental visit is recommended every 6 months. · Try to get at least 150 minutes of exercise per week or 10,000 steps per day on a pedometer . · Order or download the FREE \"Exercise & Physical Activity: Your Everyday Guide\" from [employer]. Call 0-292.343.5503 or search [employer] online. · You need 8253-7296 mg of calcium and 8448-4077 IU of vitamin D per day. It is possible to meet your calcium requirement with diet alone, but a vitamin D supplement is usually necessary to meet this goal.  · When exposed to the sun, use a sunscreen that protects against both UVA and UVB radiation with an SPF of 30 or greater. Reapply every 2 to 3 hours or after sweating, drying off with a towel, or swimming. · Always wear a seat belt when traveling in a car. Always wear a helmet when riding a bicycle or motorcycle.

## 2019-09-09 NOTE — PROGRESS NOTES
Medicare Annual Wellness Visit  Name: Carlos Gill Date: 2019   MRN: [id number] Sex: Female   Age: 66 y.o. Ethnicity: Non-/Non    : 1945 Race: Surekha Duran is here for Medicare AWV    Screenings for behavioral, psychosocial and functional/safety risks, and cognitive dysfunction are all negative except as indicated below. These results, as well as other patient data from the [address] form, are documented in Flowsheets linked to this Encounter. Allergies   Allergen Reactions    Tobramycin Swelling    Atorvastatin Other (See Comments)    Other      Penstrept which ws taken off market in 1960s, Penicillin and Streptomycin     Prior to Visit Medications    Medication Sig Taking? Authorizing Provider   levothyroxine (SYNTHROID) 50 MCG tablet TAKE ONE TABLET BY MOUTH DAILY Yes Alex Dawson MD   furosemide (LASIX) 20 MG tablet Take 1 tablet by mouth daily as needed (pedal edema) Yes Alex Dawson MD   amLODIPine (NORVASC) 10 MG tablet Take 10 mg by mouth Yes Historical Provider, MD   metoprolol tartrate (LOPRESSOR) 25 MG tablet Take 1 tablet by mouth 2 times daily Yes Alex Dawson MD   losartan (COZAAR) 100 MG tablet Take 1 tablet by mouth daily Yes Alex Dawson MD   oxybutynin (DITROPAN-XL) 5 MG extended release tablet TAKE ONE TABLET BY MOUTH DAILY Yes HARESH Goldman - CNP   Ibuprofen-diphenhydrAMINE HCl (ADVIL PM) 200-25 MG CAPS Take by mouth nightly as needed Yes Historical Provider, MD   aspirin 81 MG EC tablet Take 81 mg by mouth daily Yes Historical Provider, MD   nitroGLYCERIN (NITROSTAT) 0.4 MG SL tablet Place 0.4 mg under the tongue every 5 minutes as needed for Chest pain.  Yes Historical Provider, MD   pantoprazole (PROTONIX) 40 MG tablet   Historical Provider, MD   zolpidem (AMBIEN) 5 MG tablet   Historical Provider, MD   clotrimazole-betamethasone (LOTRISONE) 1-0.05 % cream Apply externally to affected area twice daily prn  Patient not taking: Reported on 9/9/2019  Dennys Ac MD   Multiple Vitamins-Minerals (THERAPEUTIC MULTIVITAMIN-MINERALS) tablet Take 1 tablet by mouth daily. Historical Provider, MD   solifenacin (VESICARE) 5 MG tablet Take 5 mg by mouth daily. Historical Provider, MD     Past Medical History:   Diagnosis Date    A-fib ([address]     AK (actinic keratosis)     Anesthesia     HARD TIME WAKING UP \"SOMETIMES\".  Arthritis     Asymptomatic bilateral carotid artery stenosis 3/10/2015    Bladder cancer (HCC)     bladder, first time 1993    CAD (coronary artery disease)     Colon polyp     Dr Valerio Ill Difficult intubation     SMALL MOUTH AND AIRWAY    GERD (gastroesophageal reflux disease)     Hypertension     Hypothyroid     Obesity     DAISY (obstructive sleep apnea)     NO MACHINE , she does not believe she has sleep apnea    Stress incontinence     Wears glasses     FOR READING. Past Surgical History:   Procedure Laterality Date    BLADDER SURGERY  8/1993 and 10/2002    for bladder cancer treatment    BREAST BIOPSY      LT. BREAST (BENIGN). , large mass removed   [address]      3 stents    COLONOSCOPY      3-4X    CYST REMOVAL Right     3RD FINGER.    CYSTOSCOPY  2014    Dr Eusebia Sloan  [id number]    TURB-bladder tumors removed    CYSTOSCOPY  11/18/2016    fulgeration with biopsies    EYE SURGERY      LASIK MACIEJ.  EYES.    HEMORRHOID SURGERY      HYSTERECTOMY      OTHER SURGICAL HISTORY      abscess on tailbone    OTHER SURGICAL HISTORY  July 3,2014    3 heart stents    TONSILLECTOMY       Family History   Problem Relation Age of Onset   [facility] Cancer Mother         pancreatic age 80    Cancer Father         bladder age 80    Heart Disease Maternal Grandmother     Heart Disease Maternal Grandfather     Heart Disease Paternal Grandfather        CareTeam (Including outside providers/suppliers regularly involved in providing care):   Patient Care Team:  Viry Garcia MD as PCP - General (Internal Medicine)  Viry Garcia MD as PCP - [facility] Provider  Sean Cadet MD as Consulting Physician (Ophthalmology)  Garfield Silva MD as Consulting Physician  Estella Polanco DO as Consulting Physician (Cardiology)  Pritesh Calderón as Consulting Physician (Gastroenterology)  Brennen Lynn MD as Surgeon (General Surgery)  Ros Martinez MD as Surgeon (Vascular Surgery)  Bossman Allen MD as Consulting Physician (Urology)    Wt Readings from Last 3 Encounters:   09/09/19 229 lb 9.6 oz (104.1 kg)   08/12/19 232 lb 6.4 oz (105.4 kg)   06/12/19 242 lb (109.8 kg)     Vitals:    09/09/19 0822   BP: 120/60   Pulse: 79   Resp: 18   SpO2: 99%   Weight: 229 lb 9.6 oz (104.1 kg)   Height: 5' 2.75\" (1.594 m)     Body mass index is 41 kg/m². Based upon direct observation of the patient, evaluation of cognition reveals recent and remote memory intact.     General Appearance: alert and oriented to person, place and time, well developed and well- nourished, in no acute distress  Skin: warm and dry, no rash or erythema  Head: normocephalic and atraumatic  Eyes: pupils equal, round, and reactive to light, extraocular eye movements intact, conjunctivae normal  ENT: tympanic membrane, external ear and ear canal normal bilaterally, nose without deformity, nasal mucosa and turbinates normal without polyps  Neck: supple and non-tender without mass, no thyromegaly or thyroid nodules, no cervical lymphadenopathy  Pulmonary/Chest: clear to auscultation bilaterally- no wheezes, rales or rhonchi, normal air movement, no respiratory distress  Cardiovascular: normal rate, regular rhythm, normal S1 and S2, no murmurs, rubs, clicks, or gallops, distal pulses intact, no carotid bruits  Abdomen: soft, non-tender, non-distended, normal bowel sounds, no masses or organomegaly  Extremities: no cyanosis, clubbing or edema  Musculoskeletal: normal

## 2019-11-09 DIAGNOSIS — R60.0 PEDAL EDEMA: ICD-10-CM

## 2019-11-11 ENCOUNTER — OFFICE VISIT (OUTPATIENT)
Dept: PRIMARY CARE CLINIC | Age: 74
End: 2019-11-11
Payer: MEDICARE

## 2019-11-11 VITALS
SYSTOLIC BLOOD PRESSURE: 118 MMHG | DIASTOLIC BLOOD PRESSURE: 60 MMHG | OXYGEN SATURATION: 98 % | RESPIRATION RATE: 16 BRPM | HEART RATE: 66 BPM | WEIGHT: 234.2 LBS | BODY MASS INDEX: 41.82 KG/M2

## 2019-11-11 DIAGNOSIS — E66.9 OBESITY, UNSPECIFIED CLASSIFICATION, UNSPECIFIED OBESITY TYPE, UNSPECIFIED WHETHER SERIOUS COMORBIDITY PRESENT: ICD-10-CM

## 2019-11-11 DIAGNOSIS — E78.5 DYSLIPIDEMIA: ICD-10-CM

## 2019-11-11 DIAGNOSIS — E03.9 ACQUIRED HYPOTHYROIDISM: ICD-10-CM

## 2019-11-11 DIAGNOSIS — H93.13 TINNITUS OF BOTH EARS: ICD-10-CM

## 2019-11-11 DIAGNOSIS — I10 ESSENTIAL HYPERTENSION: Primary | ICD-10-CM

## 2019-11-11 PROCEDURE — G8484 FLU IMMUNIZE NO ADMIN: HCPCS | Performed by: INTERNAL MEDICINE

## 2019-11-11 PROCEDURE — G8417 CALC BMI ABV UP PARAM F/U: HCPCS | Performed by: INTERNAL MEDICINE

## 2019-11-11 PROCEDURE — 99214 OFFICE O/P EST MOD 30 MIN: CPT | Performed by: INTERNAL MEDICINE

## 2019-11-11 PROCEDURE — G8400 PT W/DXA NO RESULTS DOC: HCPCS | Performed by: INTERNAL MEDICINE

## 2019-11-11 PROCEDURE — G8427 DOCREV CUR MEDS BY ELIG CLIN: HCPCS | Performed by: INTERNAL MEDICINE

## 2019-11-11 PROCEDURE — 1090F PRES/ABSN URINE INCON ASSESS: CPT | Performed by: INTERNAL MEDICINE

## 2019-11-11 PROCEDURE — 4040F PNEUMOC VAC/ADMIN/RCVD: CPT | Performed by: INTERNAL MEDICINE

## 2019-11-11 PROCEDURE — 1123F ACP DISCUSS/DSCN MKR DOCD: CPT | Performed by: INTERNAL MEDICINE

## 2019-11-11 PROCEDURE — G8598 ASA/ANTIPLAT THER USED: HCPCS | Performed by: INTERNAL MEDICINE

## 2019-11-11 PROCEDURE — 1036F TOBACCO NON-USER: CPT | Performed by: INTERNAL MEDICINE

## 2019-11-11 RX ORDER — FUROSEMIDE 20 MG/1
TABLET ORAL
Qty: 60 TABLET | Refills: 0 | Status: SHIPPED | OUTPATIENT
Start: 2019-11-11 | End: 2020-01-07

## 2019-11-11 ASSESSMENT — ENCOUNTER SYMPTOMS
COUGH: 0
WHEEZING: 0
SINUS PRESSURE: 0
SHORTNESS OF BREATH: 0
DIARRHEA: 0
BACK PAIN: 0
CONSTIPATION: 0
VOMITING: 0
NAUSEA: 0
ABDOMINAL PAIN: 0
ABDOMINAL DISTENTION: 0
SINUS PAIN: 0

## 2019-11-19 ENCOUNTER — PROCEDURE VISIT (OUTPATIENT)
Dept: UROLOGY | Age: 74
End: 2019-11-19
Payer: MEDICARE

## 2019-11-19 ENCOUNTER — HOSPITAL ENCOUNTER (OUTPATIENT)
Age: 74
Setting detail: SPECIMEN
Discharge: HOME | End: 2019-11-19
Payer: MEDICARE

## 2019-11-19 VITALS
BODY MASS INDEX: 41.48 KG/M2 | HEIGHT: 63 IN | DIASTOLIC BLOOD PRESSURE: 84 MMHG | HEART RATE: 78 BPM | SYSTOLIC BLOOD PRESSURE: 138 MMHG | WEIGHT: 234.13 LBS

## 2019-11-19 DIAGNOSIS — C67.9 MALIGNANT NEOPLASM OF URINARY BLADDER, UNSPECIFIED SITE (HCC): Primary | ICD-10-CM

## 2019-11-19 DIAGNOSIS — C67.9 MALIGNANT NEOPLASM OF URINARY BLADDER, UNSPECIFIED SITE (HCC): ICD-10-CM

## 2019-11-19 PROCEDURE — 52000 CYSTOURETHROSCOPY: CPT | Performed by: UROLOGY

## 2019-11-27 ENCOUNTER — TELEPHONE (OUTPATIENT)
Dept: UROLOGY | Age: 74
End: 2019-11-27

## 2019-11-27 LAB — UROTHELIAL CANCER DETECTION: NORMAL

## 2019-12-07 DIAGNOSIS — I10 ESSENTIAL HYPERTENSION: ICD-10-CM

## 2019-12-09 RX ORDER — LOSARTAN POTASSIUM 100 MG/1
TABLET ORAL
Qty: 90 TABLET | Refills: 1 | Status: SHIPPED
Start: 2019-12-09 | End: 2020-06-18 | Stop reason: SDUPTHER

## 2019-12-16 ENCOUNTER — TELEPHONE (OUTPATIENT)
Dept: UROLOGY | Age: 74
End: 2019-12-16

## 2019-12-31 ENCOUNTER — OFFICE VISIT (OUTPATIENT)
Dept: UROLOGY | Age: 74
End: 2019-12-31
Payer: MEDICARE

## 2019-12-31 ENCOUNTER — TELEPHONE (OUTPATIENT)
Dept: UROLOGY | Age: 74
End: 2019-12-31

## 2019-12-31 VITALS
HEART RATE: 87 BPM | DIASTOLIC BLOOD PRESSURE: 78 MMHG | WEIGHT: 233.69 LBS | HEIGHT: 63 IN | TEMPERATURE: 97.8 F | SYSTOLIC BLOOD PRESSURE: 152 MMHG | BODY MASS INDEX: 41.41 KG/M2

## 2019-12-31 DIAGNOSIS — C67.9 MALIGNANT NEOPLASM OF URINARY BLADDER, UNSPECIFIED SITE (HCC): ICD-10-CM

## 2019-12-31 DIAGNOSIS — N32.81 OAB (OVERACTIVE BLADDER): Primary | ICD-10-CM

## 2019-12-31 PROCEDURE — 1036F TOBACCO NON-USER: CPT | Performed by: UROLOGY

## 2019-12-31 PROCEDURE — 1090F PRES/ABSN URINE INCON ASSESS: CPT | Performed by: UROLOGY

## 2019-12-31 PROCEDURE — G8484 FLU IMMUNIZE NO ADMIN: HCPCS | Performed by: UROLOGY

## 2019-12-31 PROCEDURE — 4040F PNEUMOC VAC/ADMIN/RCVD: CPT | Performed by: UROLOGY

## 2019-12-31 PROCEDURE — 1123F ACP DISCUSS/DSCN MKR DOCD: CPT | Performed by: UROLOGY

## 2019-12-31 PROCEDURE — G8400 PT W/DXA NO RESULTS DOC: HCPCS | Performed by: UROLOGY

## 2019-12-31 PROCEDURE — G8417 CALC BMI ABV UP PARAM F/U: HCPCS | Performed by: UROLOGY

## 2019-12-31 PROCEDURE — G8598 ASA/ANTIPLAT THER USED: HCPCS | Performed by: UROLOGY

## 2019-12-31 PROCEDURE — G8428 CUR MEDS NOT DOCUMENT: HCPCS | Performed by: UROLOGY

## 2019-12-31 PROCEDURE — 99213 OFFICE O/P EST LOW 20 MIN: CPT | Performed by: UROLOGY

## 2019-12-31 RX ORDER — PREDNISOLONE ACETATE 10 MG/ML
1 SUSPENSION/ DROPS OPHTHALMIC
COMMUNITY
Start: 2019-12-05 | End: 2020-01-10 | Stop reason: ALTCHOICE

## 2020-01-07 RX ORDER — FUROSEMIDE 20 MG/1
TABLET ORAL
Qty: 60 TABLET | Refills: 0 | Status: SHIPPED | OUTPATIENT
Start: 2020-01-07 | End: 2020-03-10

## 2020-01-10 ENCOUNTER — ANESTHESIA EVENT (OUTPATIENT)
Dept: OPERATING ROOM | Age: 75
End: 2020-01-10
Payer: MEDICARE

## 2020-01-10 ENCOUNTER — HOSPITAL ENCOUNTER (OUTPATIENT)
Dept: PREADMISSION TESTING | Age: 75
Discharge: HOME | End: 2020-01-14
Payer: MEDICARE

## 2020-01-10 VITALS
BODY MASS INDEX: 41.29 KG/M2 | RESPIRATION RATE: 16 BRPM | SYSTOLIC BLOOD PRESSURE: 137 MMHG | HEIGHT: 63 IN | TEMPERATURE: 98.2 F | WEIGHT: 233 LBS | OXYGEN SATURATION: 98 % | HEART RATE: 69 BPM | DIASTOLIC BLOOD PRESSURE: 67 MMHG

## 2020-01-10 LAB
ABSOLUTE EOS #: 0.3 K/UL (ref 0–0.4)
ABSOLUTE IMMATURE GRANULOCYTE: ABNORMAL K/UL (ref 0–0.3)
ABSOLUTE LYMPH #: 1.8 K/UL (ref 1–4.8)
ABSOLUTE MONO #: 0.9 K/UL (ref 0.1–1.3)
ANION GAP SERPL CALCULATED.3IONS-SCNC: 12 MMOL/L (ref 9–17)
BASOPHILS # BLD: 1 % (ref 0–2)
BASOPHILS ABSOLUTE: 0 K/UL (ref 0–0.2)
BUN BLDV-MCNC: 19 MG/DL (ref 8–23)
BUN/CREAT BLD: NORMAL (ref 9–20)
CALCIUM SERPL-MCNC: 9.3 MG/DL (ref 8.6–10.4)
CHLORIDE BLD-SCNC: 102 MMOL/L (ref 98–107)
CO2: 27 MMOL/L (ref 20–31)
CREAT SERPL-MCNC: 0.78 MG/DL (ref 0.5–0.9)
DIFFERENTIAL TYPE: ABNORMAL
EOSINOPHILS RELATIVE PERCENT: 4 % (ref 0–4)
GFR AFRICAN AMERICAN: >60 ML/MIN
GFR NON-AFRICAN AMERICAN: >60 ML/MIN
GFR SERPL CREATININE-BSD FRML MDRD: NORMAL ML/MIN/{1.73_M2}
GFR SERPL CREATININE-BSD FRML MDRD: NORMAL ML/MIN/{1.73_M2}
GLUCOSE BLD-MCNC: 74 MG/DL (ref 70–99)
HCT VFR BLD CALC: 41.3 % (ref 36–46)
HEMOGLOBIN: 13.6 G/DL (ref 12–16)
IMMATURE GRANULOCYTES: ABNORMAL %
LYMPHOCYTES # BLD: 25 % (ref 24–44)
MCH RBC QN AUTO: 29.9 PG (ref 26–34)
MCHC RBC AUTO-ENTMCNC: 33 G/DL (ref 31–37)
MCV RBC AUTO: 90.7 FL (ref 80–100)
MONOCYTES # BLD: 12 % (ref 1–7)
NRBC AUTOMATED: ABNORMAL PER 100 WBC
PDW BLD-RTO: 13.4 % (ref 11.5–14.9)
PLATELET # BLD: 255 K/UL (ref 150–450)
PLATELET ESTIMATE: ABNORMAL
PMV BLD AUTO: 8.1 FL (ref 6–12)
POTASSIUM SERPL-SCNC: 4.5 MMOL/L (ref 3.7–5.3)
RBC # BLD: 4.55 M/UL (ref 4–5.2)
RBC # BLD: ABNORMAL 10*6/UL
SEG NEUTROPHILS: 58 % (ref 36–66)
SEGMENTED NEUTROPHILS ABSOLUTE COUNT: 4.4 K/UL (ref 1.3–9.1)
SODIUM BLD-SCNC: 141 MMOL/L (ref 135–144)
WBC # BLD: 7.4 K/UL (ref 3.5–11)
WBC # BLD: ABNORMAL 10*3/UL

## 2020-01-10 PROCEDURE — 36415 COLL VENOUS BLD VENIPUNCTURE: CPT

## 2020-01-10 PROCEDURE — 87086 URINE CULTURE/COLONY COUNT: CPT

## 2020-01-10 PROCEDURE — 87186 SC STD MICRODIL/AGAR DIL: CPT

## 2020-01-10 PROCEDURE — 87077 CULTURE AEROBIC IDENTIFY: CPT

## 2020-01-10 PROCEDURE — 93005 ELECTROCARDIOGRAM TRACING: CPT | Performed by: ANESTHESIOLOGY

## 2020-01-10 PROCEDURE — 80048 BASIC METABOLIC PNL TOTAL CA: CPT

## 2020-01-10 PROCEDURE — 85025 COMPLETE CBC W/AUTO DIFF WBC: CPT

## 2020-01-10 NOTE — H&P
HISTORY      abscess on tailbone    TONSILLECTOMY         FAMILY HISTORY       Family History   Problem Relation Age of Onset   Tyrel Ryan Cancer Mother         pancreatic age 80    Other Mother         Vascular disease    Cancer Father         bladder age 80    Heart Disease Maternal Grandmother     Heart Disease Maternal Grandfather     Heart Disease Paternal Grandfather     Cancer Paternal Aunt         ovarian       SOCIAL HISTORY       Social History     Socioeconomic History    Marital status:      Spouse name: None    Number of children: None    Years of education: None    Highest education level: None   Occupational History    None   Social Needs    Financial resource strain: None    Food insecurity:     Worry: None     Inability: None    Transportation needs:     Medical: None     Non-medical: None   Tobacco Use    Smoking status: Former Smoker     Packs/day: 1.50     Years: 30.00     Pack years: 45.00     Types: Cigarettes     Last attempt to quit: 1992     Years since quittin.0    Smokeless tobacco: Never Used   Substance and Sexual Activity    Alcohol use: Yes     Comment: COUPLE DRINKS PER WEEK.     Drug use: No    Sexual activity: None   Lifestyle    Physical activity:     Days per week: None     Minutes per session: None    Stress: None   Relationships    Social connections:     Talks on phone: None     Gets together: None     Attends Restorationist service: None     Active member of club or organization: None     Attends meetings of clubs or organizations: None     Relationship status: None    Intimate partner violence:     Fear of current or ex partner: None     Emotionally abused: None     Physically abused: None     Forced sexual activity: None   Other Topics Concern    None   Social History Narrative    None           REVIEW OF SYSTEMS      Allergies   Allergen Reactions    Atorvastatin Other (See Comments)     diarrhea    Other Other (See Comments)     Passed out and because of multiple issues with this combo drug, Penstrept was taken off market in 1960s, It contained Penicillin and Streptomycin in one pill. Patient states she has taken penicillin and/or streptomycin individually without problems.  Tobramycin Swelling    Molds & Smuts Other (See Comments)     coughing       Current Outpatient Medications on File Prior to Encounter   Medication Sig Dispense Refill    furosemide (LASIX) 20 MG tablet TAKE ONE TABLET BY MOUTH DAILY AS NEEDED FOR PEDAL EDEMA 60 tablet 0    losartan (COZAAR) 100 MG tablet TAKE ONE TABLET BY MOUTH DAILY 90 tablet 1    levothyroxine (SYNTHROID) 50 MCG tablet TAKE ONE TABLET BY MOUTH DAILY 90 tablet 1    amLODIPine (NORVASC) 10 MG tablet Take 10 mg by mouth nightly       pantoprazole (PROTONIX) 40 MG tablet Take 40 mg by mouth daily       metoprolol tartrate (LOPRESSOR) 25 MG tablet Take 1 tablet by mouth 2 times daily 180 tablet 3    zolpidem (AMBIEN) 5 MG tablet Take by mouth nightly as needed.  clotrimazole-betamethasone (LOTRISONE) 1-0.05 % cream Apply externally to affected area twice daily prn 45 g 1    Ibuprofen-diphenhydrAMINE HCl (ADVIL PM) 200-25 MG CAPS Take by mouth nightly as needed      aspirin 81 MG EC tablet Take 81 mg by mouth daily      nitroGLYCERIN (NITROSTAT) 0.4 MG SL tablet Place 0.4 mg under the tongue every 5 minutes as needed for Chest pain.  Multiple Vitamins-Minerals (THERAPEUTIC MULTIVITAMIN-MINERALS) tablet Take 1 tablet by mouth daily.  [DISCONTINUED] solifenacin (VESICARE) 5 MG tablet Take 5 mg by mouth daily. No current facility-administered medications on file prior to encounter. Negative except for what is mentioned in the HPI. GENERAL PHYSICAL EXAM     Vitals: /67   Pulse 69   Temp 98.2 °F (36.8 °C) (Oral)   Resp 16   Ht 5' 2.75\" (1.594 m)   Wt 233 lb (105.7 kg)   SpO2 98%   BMI 41.60 kg/m²  Body mass index is 41.6 kg/m².      GENERAL APPEARANCE:   Knox Snellen Huong Maldonado is 66 y.o.,  female, moderately obese, nourished, conscious, alert. Does not appear to be distress or pain at this time. SKIN:  Warm, dry, no cyanosis or jaundice. HEAD:  Normocephalic, atraumatic, no swelling or tenderness. EYES:  Pupils equal, reactive to light. EARS:  No discharge, no marked hearing loss. NOSE:  No rhinorrhea, epistaxis or septal deformity. THROAT:  Not congested. No ulceration bleeding or discharge. NECK:  No stiffness, trachea central.  No palpable masses or L.N.                 CHEST:  Symmetrical and equal on expansion. HEART:  RRR S1 > S2. No audible murmurs or gallops. LUNGS:  Equal on expansion, normal breath sounds. No adventitious sounds. ABDOMEN:  Obese. Soft on palpation. No dysphagia, No localized tenderness. No guarding or rigidity. No palpable hepatosplenomegaly. LYMPHATICS:  No palpable cervical lymphadenopathy. LOCOMOTOR, BACK AND SPINE:  No tenderness or deformities. EXTREMITIES:  Symmetrical, +1 pretibial edema Jesse. Bettyes sign negative. No discoloration or ulcerations. NEUROLOGIC:  The patient is conscious, alert, oriented,Cranial nerve II-XII intact, taste and smell were not examined. No apparent focal sensory or motor deficits. PROVISIONAL DIAGNOSES / SURGERY:      Overactive bladder. Cystoscopy, Botox injection 100 units.     Patient Active Problem List    Diagnosis Date Noted    Left ventricular hypertrophy 02/21/2019    Dyslipidemia 05/07/2018    Breast cyst 06/23/2015    Carotid stenosis 04/14/2015    Asymptomatic bilateral carotid artery stenosis 03/10/2015    Bladder cancer ([facility] [address] 08/25/2014    Neurogenic bladder 08/05/2014    CAD (coronary artery disease)     DDD (degenerative disc disease), lumbar 03/04/2014    DAISY (obstructive sleep

## 2020-01-10 NOTE — PROGRESS NOTES
Dr. Tashi Land, anesthesia, was contacted and informed of the patient's history and unconfirmed NSR EKG results from EKG done today in [facility]. No clearance required.

## 2020-01-12 LAB
CULTURE: ABNORMAL
Lab: ABNORMAL
SPECIMEN DESCRIPTION: ABNORMAL

## 2020-01-13 LAB
EKG ATRIAL RATE: 72 BPM
EKG P AXIS: 30 DEGREES
EKG P-R INTERVAL: 164 MS
EKG Q-T INTERVAL: 390 MS
EKG QRS DURATION: 66 MS
EKG QTC CALCULATION (BAZETT): 427 MS
EKG R AXIS: 11 DEGREES
EKG T AXIS: 33 DEGREES
EKG VENTRICULAR RATE: 72 BPM

## 2020-01-13 PROCEDURE — 93010 ELECTROCARDIOGRAM REPORT: CPT | Performed by: INTERNAL MEDICINE

## 2020-01-13 RX ORDER — CEPHALEXIN 500 MG/1
500 CAPSULE ORAL 3 TIMES DAILY
Qty: 21 CAPSULE | Refills: 0 | Status: SHIPPED | OUTPATIENT
Start: 2020-01-13 | End: 2020-02-12 | Stop reason: ALTCHOICE

## 2020-01-21 ENCOUNTER — ANESTHESIA (OUTPATIENT)
Dept: OPERATING ROOM | Age: 75
End: 2020-01-21
Payer: MEDICARE

## 2020-01-21 ENCOUNTER — HOSPITAL ENCOUNTER (OUTPATIENT)
Age: 75
Setting detail: OUTPATIENT SURGERY
Discharge: HOME | End: 2020-01-21
Attending: UROLOGY | Admitting: UROLOGY
Payer: MEDICARE

## 2020-01-21 VITALS
RESPIRATION RATE: 16 BRPM | HEART RATE: 66 BPM | HEIGHT: 63 IN | OXYGEN SATURATION: 97 % | TEMPERATURE: 97.3 F | SYSTOLIC BLOOD PRESSURE: 145 MMHG | DIASTOLIC BLOOD PRESSURE: 65 MMHG | WEIGHT: 233 LBS | BODY MASS INDEX: 41.29 KG/M2

## 2020-01-21 VITALS — OXYGEN SATURATION: 98 % | DIASTOLIC BLOOD PRESSURE: 56 MMHG | SYSTOLIC BLOOD PRESSURE: 130 MMHG

## 2020-01-21 PROCEDURE — 6360000002 HC RX W HCPCS: Performed by: UROLOGY

## 2020-01-21 PROCEDURE — 3700000001 HC ADD 15 MINUTES (ANESTHESIA): Performed by: UROLOGY

## 2020-01-21 PROCEDURE — 6360000002 HC RX W HCPCS: Performed by: NURSE ANESTHETIST, CERTIFIED REGISTERED

## 2020-01-21 PROCEDURE — 3600000012 HC SURGERY LEVEL 2 ADDTL 15MIN: Performed by: UROLOGY

## 2020-01-21 PROCEDURE — 2500000003 HC RX 250 WO HCPCS: Performed by: NURSE ANESTHETIST, CERTIFIED REGISTERED

## 2020-01-21 PROCEDURE — 7100000030 HC ASPR PHASE II RECOVERY - FIRST 15 MIN: Performed by: UROLOGY

## 2020-01-21 PROCEDURE — 7100000001 HC PACU RECOVERY - ADDTL 15 MIN: Performed by: UROLOGY

## 2020-01-21 PROCEDURE — 7100000031 HC ASPR PHASE II RECOVERY - ADDTL 15 MIN: Performed by: UROLOGY

## 2020-01-21 PROCEDURE — 2580000003 HC RX 258: Performed by: ANESTHESIOLOGY

## 2020-01-21 PROCEDURE — 3700000000 HC ANESTHESIA ATTENDED CARE: Performed by: UROLOGY

## 2020-01-21 PROCEDURE — 3600000002 HC SURGERY LEVEL 2 BASE: Performed by: UROLOGY

## 2020-01-21 PROCEDURE — 7100000000 HC PACU RECOVERY - FIRST 15 MIN: Performed by: UROLOGY

## 2020-01-21 PROCEDURE — 2709999900 HC NON-CHARGEABLE SUPPLY: Performed by: UROLOGY

## 2020-01-21 RX ORDER — ONDANSETRON 2 MG/ML
4 INJECTION INTRAMUSCULAR; INTRAVENOUS
Status: DISCONTINUED | OUTPATIENT
Start: 2020-01-21 | End: 2020-01-21 | Stop reason: HOSPADM

## 2020-01-21 RX ORDER — HYDROCODONE BITARTRATE AND ACETAMINOPHEN 5; 325 MG/1; MG/1
2 TABLET ORAL PRN
Status: DISCONTINUED | OUTPATIENT
Start: 2020-01-21 | End: 2020-01-21 | Stop reason: HOSPADM

## 2020-01-21 RX ORDER — FENTANYL CITRATE 50 UG/ML
25 INJECTION, SOLUTION INTRAMUSCULAR; INTRAVENOUS EVERY 5 MIN PRN
Status: DISCONTINUED | OUTPATIENT
Start: 2020-01-21 | End: 2020-01-21 | Stop reason: HOSPADM

## 2020-01-21 RX ORDER — HYDROCODONE BITARTRATE AND ACETAMINOPHEN 5; 325 MG/1; MG/1
1 TABLET ORAL PRN
Status: DISCONTINUED | OUTPATIENT
Start: 2020-01-21 | End: 2020-01-21 | Stop reason: HOSPADM

## 2020-01-21 RX ORDER — LABETALOL 20 MG/4 ML (5 MG/ML) INTRAVENOUS SYRINGE
5 EVERY 10 MIN PRN
Status: DISCONTINUED | OUTPATIENT
Start: 2020-01-21 | End: 2020-01-21 | Stop reason: HOSPADM

## 2020-01-21 RX ORDER — FENTANYL CITRATE 50 UG/ML
50 INJECTION, SOLUTION INTRAMUSCULAR; INTRAVENOUS EVERY 5 MIN PRN
Status: DISCONTINUED | OUTPATIENT
Start: 2020-01-21 | End: 2020-01-21 | Stop reason: HOSPADM

## 2020-01-21 RX ORDER — METOCLOPRAMIDE HYDROCHLORIDE 5 MG/ML
10 INJECTION INTRAMUSCULAR; INTRAVENOUS
Status: DISCONTINUED | OUTPATIENT
Start: 2020-01-21 | End: 2020-01-21 | Stop reason: HOSPADM

## 2020-01-21 RX ORDER — HYDRALAZINE HYDROCHLORIDE 20 MG/ML
5 INJECTION INTRAMUSCULAR; INTRAVENOUS EVERY 10 MIN PRN
Status: DISCONTINUED | OUTPATIENT
Start: 2020-01-21 | End: 2020-01-21 | Stop reason: HOSPADM

## 2020-01-21 RX ORDER — PROPOFOL 10 MG/ML
INJECTION, EMULSION INTRAVENOUS PRN
Status: DISCONTINUED | OUTPATIENT
Start: 2020-01-21 | End: 2020-01-21 | Stop reason: SDUPTHER

## 2020-01-21 RX ORDER — SODIUM CHLORIDE, SODIUM LACTATE, POTASSIUM CHLORIDE, CALCIUM CHLORIDE 600; 310; 30; 20 MG/100ML; MG/100ML; MG/100ML; MG/100ML
INJECTION, SOLUTION INTRAVENOUS CONTINUOUS
Status: DISCONTINUED | OUTPATIENT
Start: 2020-01-21 | End: 2020-01-21 | Stop reason: HOSPADM

## 2020-01-21 RX ORDER — DIPHENHYDRAMINE HYDROCHLORIDE 50 MG/ML
12.5 INJECTION INTRAMUSCULAR; INTRAVENOUS
Status: DISCONTINUED | OUTPATIENT
Start: 2020-01-21 | End: 2020-01-21 | Stop reason: HOSPADM

## 2020-01-21 RX ORDER — CEPHALEXIN 500 MG/1
500 CAPSULE ORAL 2 TIMES DAILY
Qty: 6 CAPSULE | Refills: 0 | Status: SHIPPED | OUTPATIENT
Start: 2020-01-21 | End: 2020-02-12 | Stop reason: ALTCHOICE

## 2020-01-21 RX ORDER — LIDOCAINE HYDROCHLORIDE 10 MG/ML
INJECTION, SOLUTION EPIDURAL; INFILTRATION; INTRACAUDAL; PERINEURAL PRN
Status: DISCONTINUED | OUTPATIENT
Start: 2020-01-21 | End: 2020-01-21 | Stop reason: SDUPTHER

## 2020-01-21 RX ORDER — MORPHINE SULFATE 2 MG/ML
2 INJECTION, SOLUTION INTRAMUSCULAR; INTRAVENOUS EVERY 5 MIN PRN
Status: DISCONTINUED | OUTPATIENT
Start: 2020-01-21 | End: 2020-01-21 | Stop reason: HOSPADM

## 2020-01-21 RX ORDER — MEPERIDINE HYDROCHLORIDE 25 MG/ML
12.5 INJECTION INTRAMUSCULAR; INTRAVENOUS; SUBCUTANEOUS EVERY 5 MIN PRN
Status: DISCONTINUED | OUTPATIENT
Start: 2020-01-21 | End: 2020-01-21 | Stop reason: HOSPADM

## 2020-01-21 RX ADMIN — LIDOCAINE HYDROCHLORIDE 50 MG: 10 INJECTION, SOLUTION EPIDURAL; INFILTRATION; INTRACAUDAL at 13:14

## 2020-01-21 RX ADMIN — CEFAZOLIN 2 G: 10 INJECTION, POWDER, FOR SOLUTION INTRAVENOUS at 13:16

## 2020-01-21 RX ADMIN — PROPOFOL 360 MG: 10 INJECTION, EMULSION INTRAVENOUS at 13:14

## 2020-01-21 RX ADMIN — SODIUM CHLORIDE, POTASSIUM CHLORIDE, SODIUM LACTATE AND CALCIUM CHLORIDE: 600; 310; 30; 20 INJECTION, SOLUTION INTRAVENOUS at 12:04

## 2020-01-21 ASSESSMENT — PULMONARY FUNCTION TESTS
PIF_VALUE: 1
PIF_VALUE: 0
PIF_VALUE: 1
PIF_VALUE: 0
PIF_VALUE: 1
PIF_VALUE: 0
PIF_VALUE: 1

## 2020-01-21 ASSESSMENT — ENCOUNTER SYMPTOMS: STRIDOR: 0

## 2020-01-21 ASSESSMENT — PAIN SCALES - GENERAL
PAINLEVEL_OUTOF10: 0

## 2020-01-21 ASSESSMENT — PAIN - FUNCTIONAL ASSESSMENT: PAIN_FUNCTIONAL_ASSESSMENT: 0-10

## 2020-01-21 ASSESSMENT — PAIN DESCRIPTION - PAIN TYPE: TYPE: ACUTE PAIN

## 2020-01-21 NOTE — H&P
units.     PAST MEDICAL HISTORY      Past Medical History        Past Medical History:   Diagnosis Date    AK (actinic keratosis)       from sun exposure    Anesthesia       HARD TIME WAKING UP \"SOMETIMES\".  Arthritis      Asymptomatic bilateral carotid artery stenosis 3/10/2015    Bladder cancer (HCC)       bladder, first time 1993    CAD (coronary artery disease)      Colon polyp       Dr Krunal Hendrix    Diarrhea      Difficult intubation       SMALL MOUTH AND AIRWAY    GERD (gastroesophageal reflux disease)      History of atrial fibrillation       not chronic    Hypertension      Hypothyroid      Obesity      DAISY (obstructive sleep apnea)       NO MACHINE, she has bad sinus issues and requested alternatives instead of a machine, nothing was done    PAC (premature atrial contraction)       occasionally    Stress incontinence      Wears glasses       FOR READING.            SURGICAL HISTORY        Past Surgical History         Past Surgical History:   Procedure Laterality Date    BLADDER SURGERY   8/1993 and 10/2002     for bladder cancer treatment    BREAST BIOPSY         LT. BREAST (BENIGN). , large mass removed    CHOLECYSTECTOMY        COLONOSCOPY         3-4X    CORONARY ANGIOPLASTY   07/03/2014     3 stents inserted via heart cath    CYST REMOVAL Right       3RD FINGER.    CYSTOSCOPY   2014     Dr Renee Isaac   [birth year]     TURB-bladder tumors removed    CYSTOSCOPY   11/18/2016     fulgeration with biopsies    EYE SURGERY         LASIK MACIEJ.  EYES.    HEMORRHOID SURGERY        HYSTERECTOMY, TOTAL ABDOMINAL   1999     unsure if ovaries remain    OTHER SURGICAL HISTORY         abscess on tailbone    TONSILLECTOMY                FAMILY HISTORY        Family History         Family History   Problem Relation Age of Onset    Cancer Mother           pancreatic age 80   [facility] Other Mother           Vascular disease    Cancer Father           bladder age 80    Heart Disease Maternal penicillin and/or streptomycin individually without problems.  Tobramycin Swelling    Molds & Smuts Other (See Comments)       coughing                Current Outpatient Medications on File Prior to Encounter   Medication Sig Dispense Refill    furosemide (LASIX) 20 MG tablet TAKE ONE TABLET BY MOUTH DAILY AS NEEDED FOR PEDAL EDEMA 60 tablet 0    losartan (COZAAR) 100 MG tablet TAKE ONE TABLET BY MOUTH DAILY 90 tablet 1    levothyroxine (SYNTHROID) 50 MCG tablet TAKE ONE TABLET BY MOUTH DAILY 90 tablet 1    amLODIPine (NORVASC) 10 MG tablet Take 10 mg by mouth nightly         pantoprazole (PROTONIX) 40 MG tablet Take 40 mg by mouth daily         metoprolol tartrate (LOPRESSOR) 25 MG tablet Take 1 tablet by mouth 2 times daily 180 tablet 3    zolpidem (AMBIEN) 5 MG tablet Take by mouth nightly as needed.         clotrimazole-betamethasone (LOTRISONE) 1-0.05 % cream Apply externally to affected area twice daily prn 45 g 1    Ibuprofen-diphenhydrAMINE HCl (ADVIL PM) 200-25 MG CAPS Take by mouth nightly as needed        aspirin 81 MG EC tablet Take 81 mg by mouth daily        nitroGLYCERIN (NITROSTAT) 0.4 MG SL tablet Place 0.4 mg under the tongue every 5 minutes as needed for Chest pain.        Multiple Vitamins-Minerals (THERAPEUTIC MULTIVITAMIN-MINERALS) tablet Take 1 tablet by mouth daily.        [DISCONTINUED] solifenacin (VESICARE) 5 MG tablet Take 5 mg by mouth daily.          No current facility-administered medications on file prior to encounter.          Negative except for what is mentioned in the HPI. GENERAL PHYSICAL EXAM      Vitals: /67   Pulse 69   Temp 98.2 °F (36.8 °C) (Oral)   Resp 16   Ht 5' 2.75\" (1.594 m)   Wt 233 lb (105.7 kg)   SpO2 98%   BMI 41.60 kg/m²  Body mass index is 41.6 kg/m².      GENERAL APPEARANCE:   Homero Guevara is 66 y.o.,  female, moderately obese, nourished, conscious, alert. Does not appear to be distress or pain at this time. SKIN:  Warm, dry, no cyanosis or jaundice. HEAD:  Normocephalic, atraumatic, no swelling or tenderness. EYES:  Pupils equal, reactive to light. EARS:  No discharge, no marked hearing loss. NOSE:  No rhinorrhea, epistaxis or septal deformity. THROAT:  Not congested. No ulceration bleeding or discharge. NECK:  No stiffness, trachea central.  No palpable masses or L.N.                 CHEST:  Symmetrical and equal on expansion. HEART:  RRR S1 > S2. No audible murmurs or gallops. LUNGS:  Equal on expansion, normal breath sounds. No adventitious sounds. ABDOMEN:  Obese. Soft on palpation. No dysphagia, No localized tenderness. No guarding or rigidity. No palpable hepatosplenomegaly. LYMPHATICS:  No palpable cervical lymphadenopathy.      LOCOMOTOR, BACK AND SPINE:  No tenderness or deformities. EXTREMITIES:  Symmetrical, +1 pretibial edema Jesse. Bettyes sign negative. No discoloration or ulcerations.     NEUROLOGIC:  The patient is conscious, alert, oriented,Cranial nerve II-XII intact, taste and smell were not examined. No apparent focal sensory or motor deficits. PROVISIONAL DIAGNOSES / SURGERY:       Overactive bladder.   Cystoscopy, Botox injection 100 units.          Patient Active Problem List     Diagnosis Date Noted    Left ventricular hypertrophy 02/21/2019    Dyslipidemia 05/07/2018    Breast cyst 06/23/2015    Carotid stenosis 04/14/2015    Asymptomatic bilateral carotid artery stenosis 03/10/2015    Bladder cancer ([facility] [address] 08/25/2014    Neurogenic bladder 08/05/2014    CAD (coronary artery disease)      DDD (degenerative disc disease), lumbar 03/04/2014    DAISY (obstructive sleep apnea)      AK (actinic keratosis)      Hypothyroid      Irritable bowel syndrome      GERD (gastroesophageal reflux disease)     

## 2020-01-21 NOTE — ANESTHESIA PRE PROCEDURE
Department of Anesthesiology  Preprocedure Note       Name:  Lachelle Baker   Age:  66 y.o.  :  1945                                          MRN:  [id number]         Date:  2020      Surgeon: Dat Charles):  Helene Gonzalez MD    Procedure: CYSTO BOTOX INJECTION 100UNITS (N/A )    Medications prior to admission:   Prior to Admission medications    Medication Sig Start Date End Date Taking? Authorizing Provider   losartan (COZAAR) 100 MG tablet TAKE ONE TABLET BY MOUTH DAILY 19  Yes Shaun Ocampo MD   levothyroxine (SYNTHROID) 50 MCG tablet TAKE ONE TABLET BY MOUTH DAILY 19  Yes Shaun Ocampo MD   pantoprazole (PROTONIX) 40 MG tablet Take 40 mg by mouth daily  19  Yes Historical Provider, MD   metoprolol tartrate (LOPRESSOR) 25 MG tablet Take 1 tablet by mouth 2 times daily 19  Yes Shaun Ocampo MD   clotrimazole-betamethasone (Nick Arn) 1-0.05 % cream Apply externally to affected area twice daily prn 19  Yes Helene Gonzalez MD   cephALEXin (KEFLEX) 500 MG capsule Take 1 capsule by mouth 3 times daily 20   Helene Gonzalez MD   furosemide (LASIX) 20 MG tablet TAKE ONE TABLET BY MOUTH DAILY AS NEEDED FOR PEDAL EDEMA 20   Shaun Ocampo MD   amLODIPine (NORVASC) 10 MG tablet Take 10 mg by mouth nightly  19   Historical Provider, MD   zolpidem (AMBIEN) 5 MG tablet Take by mouth nightly as needed. 19   Historical Provider, MD   Ibuprofen-diphenhydrAMINE HCl (ADVIL PM) 200-25 MG CAPS Take by mouth nightly as needed    Historical Provider, MD   aspirin 81 MG EC tablet Take 81 mg by mouth daily    Historical Provider, MD   nitroGLYCERIN (NITROSTAT) 0.4 MG SL tablet Place 0.4 mg under the tongue every 5 minutes as needed for Chest pain. Historical Provider, MD   Multiple Vitamins-Minerals (THERAPEUTIC MULTIVITAMIN-MINERALS) tablet Take 1 tablet by mouth daily. Historical Provider, MD   solifenacin (VESICARE) 5 MG tablet Take 5 mg by mouth daily.  History of atrial fibrillation     not chronic    Hypertension     Hypothyroid     Obesity     DAISY (obstructive sleep apnea)     NO MACHINE, she has bad sinus issues and requested alternatives instead of a machine, nothing was done    PAC (premature atrial contraction)     occasionally    Stress incontinence     Wears glasses     FOR READING. Past Surgical History:        Procedure Laterality Date    BLADDER SURGERY  1993 and 10/2002    for bladder cancer treatment    BREAST BIOPSY      LT. BREAST (BENIGN). , large mass removed    CHOLECYSTECTOMY      COLONOSCOPY      3-4X    CORONARY ANGIOPLASTY  2014    3 stents inserted via heart cath    CYST REMOVAL Right     3RD FINGER.    CYSTOSCOPY      Dr Kelle Medellin  [id number]    TURB-bladder tumors removed    CYSTOSCOPY  2016    fulgeration with biopsies    EYE SURGERY      LASIK MACIEJ. EYES.    HEMORRHOID SURGERY      HYSTERECTOMY, TOTAL ABDOMINAL      unsure if ovaries remain    OTHER SURGICAL HISTORY      abscess on tailbone    TONSILLECTOMY         Social History:    Social History     Tobacco Use    Smoking status: Former Smoker     Packs/day: 1.50     Years: 30.00     Pack years: 45.00     Types: Cigarettes     Last attempt to quit: 1992     Years since quittin.0    Smokeless tobacco: Never Used   Substance Use Topics    Alcohol use: Yes     Comment: COUPLE DRINKS PER WEEK.                                 Counseling given: Not Answered      Vital Signs (Current):   Vitals:    20 1149 20 1151   BP:  (!) 164/81   Pulse:  71   Resp:  18   Temp:  97.3 °F (36.3 °C)   TempSrc:  Oral   SpO2:  96%   Weight: 233 lb (105.7 kg)    Height: 5' 2.75\" (1.594 m)                                               BP Readings from Last 3 Encounters:   20 (!) 164/81   01/10/20 137/67   19 (!) 152/78       NPO Status: Time of last liquid consumption: 2300                        Time of last solid change,           Endo/Other:    (+) hypothyroidism::., .                 Abdominal:           Vascular:                                        Anesthesia Plan      MAC     ASA 3       Induction: intravenous. MIPS: Postoperative opioids intended and Prophylactic antiemetics administered. Anesthetic plan and risks discussed with patient. Plan discussed with CRNA.                   Melisa Luna MD   1/21/2020

## 2020-01-21 NOTE — ANESTHESIA POSTPROCEDURE EVALUATION
POST- ANESTHESIA EVALUATION       Pt Name: Bunny Turner  MRN: [id number]  YOB: 1945  Date of evaluation: 1/21/2020  Time:  2:40 PM      BP (!) 147/68   Pulse 62   Temp 97.3 °F (36.3 °C) (Temporal)   Resp 16   Ht 5' 2.75\" (1.594 m)   Wt 233 lb (105.7 kg)   SpO2 97%   BMI 41.60 kg/m²      Consciousness Level  Awake  Cardiopulmonary Status  Stable  Pain Adequately Treated YES  Nausea / Vomiting  NO  Adequate Hydration  YES  Anesthesia Related Complications NONE      Electronically signed by Shawna Manuel MD on 1/21/2020 at 2:40 PM       Department of Anesthesiology  Postprocedure Note    Patient: Bunny Turner  MRN: [id number]  YOB: 1945  Date of evaluation: 1/21/2020  Time:  2:40 PM     Procedure Summary     Date:  01/21/20 Room / Location:  [address] [place] [address] / [address] [place] [address] [address]    Anesthesia Start:  1290 Anesthesia Stop:  5094    Procedure:  CYSTO BOTOX INJECTION 100UNITS (N/A ) Diagnosis:  (OVERACTIVE BLADDER)    Surgeon:  Ann Chowdary MD Responsible Provider:  Shawna Manuel MD    Anesthesia Type:  MAC ASA Status:  3          Anesthesia Type: MAC    Rick Phase I: Rick Score: 10    Rick Phase II: Rick Score: 10    Last vitals: Reviewed and per EMR flowsheets.        Anesthesia Post Evaluation

## 2020-01-22 NOTE — OP NOTE
[address] [facility]                 [address] [facility], [address]                                OPERATIVE REPORT    PATIENT NAME: Cristela Milan                   :        1945  MED REC NO:   [id number]                              ROOM:  ACCOUNT NO:   [de-identified]                           ADMIT DATE: 2020  PROVIDER:     Phoebe Dominguez    DATE OF PROCEDURE:  2020    PREOPERATIVE DIAGNOSES:  Overactive bladder and incontinence. POSTOPERATIVE DIAGNOSES:  Overactive bladder and incontinence. OPERATION PERFORMED:  Cystoscopy with intravesical Botox injection. SURGEON:  Phoebe Dominguez MD    ANESTHESIA:  MAC.    COMPLICATIONS:  None. BLEEDING:  None. DRAINS:  None. SPECIMENS:  None. HISTORY OF PRESENT ILLNESS:  The patient is a 66-year-old female who had  severe incontinence, underwent urodynamics which showed significant  overactive bladder. She has failed multiple medications. She is here  for Botox injection. Risks and benefits were discussed with her and she  is here today. PROCEDURE IN DETAIL:  The patient was brought back to the operating room  and laid on the operating table in the supine position. Once MAC  anesthesia was obtained, she was placed in the dorsal lithotomy  position, prepped and draped in the usual sterile fashion. A timeout  was performed. She was properly identified. Antibiotics were  administered. Cystoscope was inserted through the urethra into the  bladder. The bladder was visualized in its entirety and was  unremarkable. 100 units of Botox were then injected along the dome of  the bladder and the posterior and lateral walls, 0.5 mL injection for a  total of 5 units were injected across 20 different injection sites and  two other 0.5 mL injections were done to complete the flushing of the  needle. At that point, the scope was removed, the procedure was  completed.   She awoke from anesthesia

## 2020-01-28 ENCOUNTER — TELEPHONE (OUTPATIENT)
Dept: UROLOGY | Age: 75
End: 2020-01-28

## 2020-01-31 ENCOUNTER — TELEPHONE (OUTPATIENT)
Dept: PRIMARY CARE CLINIC | Age: 75
End: 2020-01-31

## 2020-01-31 DIAGNOSIS — Z12.39 SCREENING FOR BREAST CANCER: Primary | ICD-10-CM

## 2020-02-07 ENCOUNTER — HOSPITAL ENCOUNTER (OUTPATIENT)
Dept: MAMMOGRAPHY | Age: 75
Discharge: HOME | End: 2020-02-09
Payer: MEDICARE

## 2020-02-07 ENCOUNTER — HOSPITAL ENCOUNTER (OUTPATIENT)
Age: 75
Setting detail: SPECIMEN
Discharge: HOME | End: 2020-02-07
Payer: MEDICARE

## 2020-02-07 LAB
ANION GAP SERPL CALCULATED.3IONS-SCNC: 14 MMOL/L (ref 9–17)
BUN BLDV-MCNC: 18 MG/DL (ref 8–23)
BUN/CREAT BLD: NORMAL (ref 9–20)
CALCIUM SERPL-MCNC: 9.5 MG/DL (ref 8.6–10.4)
CHLORIDE BLD-SCNC: 103 MMOL/L (ref 98–107)
CO2: 23 MMOL/L (ref 20–31)
CREAT SERPL-MCNC: 0.83 MG/DL (ref 0.5–0.9)
GFR AFRICAN AMERICAN: >60 ML/MIN
GFR NON-AFRICAN AMERICAN: >60 ML/MIN
GFR SERPL CREATININE-BSD FRML MDRD: NORMAL ML/MIN/{1.73_M2}
GFR SERPL CREATININE-BSD FRML MDRD: NORMAL ML/MIN/{1.73_M2}
GLUCOSE BLD-MCNC: 88 MG/DL (ref 70–99)
POTASSIUM SERPL-SCNC: 4.6 MMOL/L (ref 3.7–5.3)
SODIUM BLD-SCNC: 140 MMOL/L (ref 135–144)
TSH SERPL DL<=0.05 MIU/L-ACNC: 6.57 MIU/L (ref 0.3–5)

## 2020-02-07 PROCEDURE — 77063 BREAST TOMOSYNTHESIS BI: CPT

## 2020-02-12 ENCOUNTER — OFFICE VISIT (OUTPATIENT)
Dept: PRIMARY CARE CLINIC | Age: 75
End: 2020-02-12
Payer: MEDICARE

## 2020-02-12 VITALS
SYSTOLIC BLOOD PRESSURE: 130 MMHG | HEART RATE: 93 BPM | WEIGHT: 234.8 LBS | OXYGEN SATURATION: 98 % | RESPIRATION RATE: 16 BRPM | BODY MASS INDEX: 41.93 KG/M2 | DIASTOLIC BLOOD PRESSURE: 70 MMHG

## 2020-02-12 PROBLEM — L57.0 ACTINIC KERATOSIS: Status: ACTIVE | Noted: 2020-02-12

## 2020-02-12 PROBLEM — I25.10 ATHEROSCLEROSIS OF NATIVE CORONARY ARTERY OF NATIVE HEART WITHOUT ANGINA PECTORIS: Status: ACTIVE | Noted: 2017-05-23

## 2020-02-12 PROBLEM — K22.70 BARRETT'S ESOPHAGUS WITHOUT DYSPLASIA: Status: ACTIVE | Noted: 2019-10-22

## 2020-02-12 PROBLEM — K21.9 GASTROESOPHAGEAL REFLUX DISEASE: Status: ACTIVE | Noted: 2020-02-12

## 2020-02-12 PROBLEM — K63.5 POLYP OF COLON: Status: ACTIVE | Noted: 2020-02-12

## 2020-02-12 PROCEDURE — 1123F ACP DISCUSS/DSCN MKR DOCD: CPT | Performed by: INTERNAL MEDICINE

## 2020-02-12 PROCEDURE — G8427 DOCREV CUR MEDS BY ELIG CLIN: HCPCS | Performed by: INTERNAL MEDICINE

## 2020-02-12 PROCEDURE — 4040F PNEUMOC VAC/ADMIN/RCVD: CPT | Performed by: INTERNAL MEDICINE

## 2020-02-12 PROCEDURE — 1036F TOBACCO NON-USER: CPT | Performed by: INTERNAL MEDICINE

## 2020-02-12 PROCEDURE — G8484 FLU IMMUNIZE NO ADMIN: HCPCS | Performed by: INTERNAL MEDICINE

## 2020-02-12 PROCEDURE — G8417 CALC BMI ABV UP PARAM F/U: HCPCS | Performed by: INTERNAL MEDICINE

## 2020-02-12 PROCEDURE — G8400 PT W/DXA NO RESULTS DOC: HCPCS | Performed by: INTERNAL MEDICINE

## 2020-02-12 PROCEDURE — 99214 OFFICE O/P EST MOD 30 MIN: CPT | Performed by: INTERNAL MEDICINE

## 2020-02-12 PROCEDURE — 1090F PRES/ABSN URINE INCON ASSESS: CPT | Performed by: INTERNAL MEDICINE

## 2020-02-12 ASSESSMENT — ENCOUNTER SYMPTOMS
WHEEZING: 0
NAUSEA: 0
SINUS PAIN: 0
ABDOMINAL DISTENTION: 0
BACK PAIN: 0
CONSTIPATION: 0
VOMITING: 0
DIARRHEA: 0
SINUS PRESSURE: 0
SHORTNESS OF BREATH: 0
COUGH: 0
ABDOMINAL PAIN: 0

## 2020-02-12 ASSESSMENT — PATIENT HEALTH QUESTIONNAIRE - PHQ9
2. FEELING DOWN, DEPRESSED OR HOPELESS: 0
1. LITTLE INTEREST OR PLEASURE IN DOING THINGS: 0
SUM OF ALL RESPONSES TO PHQ QUESTIONS 1-9: 0
SUM OF ALL RESPONSES TO PHQ9 QUESTIONS 1 & 2: 0
SUM OF ALL RESPONSES TO PHQ QUESTIONS 1-9: 0

## 2020-02-12 NOTE — PATIENT INSTRUCTIONS
side.  3. Hold one end of the elastic band in the hand of the painful arm. 4. Slowly rotate your forearm toward your body until it touches your belly. Slowly move it back to where you started. 5. Keep your elbow and upper arm firmly tucked against the towel roll or at your side. 6. Repeat 8 to 12 times. External rotator strengthening exercise   1. Start by tying a piece of elastic exercise material to a doorknob. You can use surgical tubing or Thera-Band. (You may also hold one end of the band in each hand.)  2. Stand or sit with your shoulder relaxed and your elbow bent 90 degrees. Your upper arm should rest comfortably against your side. Squeeze a rolled towel between your elbow and your body for comfort. This will help keep your arm at your side. 3. Hold one end of the elastic band with the hand of the painful arm. 4. Start with your forearm across your belly. Slowly rotate the forearm out away from your body. Keep your elbow and upper arm tucked against the towel roll or the side of your body until you begin to feel tightness in your shoulder. Slowly move your arm back to where you started. 5. Repeat 8 to 12 times. Follow-up care is a key part of your treatment and safety. Be sure to make and go to all appointments, and call your doctor if you are having problems. It's also a good idea to know your test results and keep a list of the medicines you take. Where can you learn more? Go to https://EUROBOXpebernardoBeyondCore.United Maps. org and sign in to your [employer] account. Enter Ida Altamirano in the [facility] box to learn more about \"Rotator Cuff: Exercises. \"     If you do not have an account, please click on the \"Sign Up Now\" link. Current as of: June 26, 2019  Content Version: 12.3  © 1891-8967 Healthwise, Incorporated. Care instructions adapted under license by [facility] ([facility]).  If you have questions about a medical condition or this instruction, always ask your healthcare professional. Ronnell Montero, Incorporated disclaims any warranty or liability for your use of this information. Patient Education        Shoulder Arthritis: Exercises  Introduction  Here are some examples of exercises for you to try. The exercises may be suggested for a condition or for rehabilitation. Start each exercise slowly. Ease off the exercises if you start to have pain. You will be told when to start these exercises and which ones will work best for you. How to do the exercises  Shoulder flexion (lying down)   1. Lie on your back, holding a wand with both hands. Your palms should face down as you hold the wand. 2. Keeping your elbows straight, slowly raise your arms over your head. Raise them until you feel a stretch in your shoulders, upper back, and chest.  3. Hold for 15 to 30 seconds. 4. Repeat 2 to 4 times. Shoulder rotation (lying down)   1. Lie on your back. Hold a wand with both hands with your elbows bent and palms up. 2. Keep your elbows close to your body, and move the wand across your body toward the sore arm. 3. Hold for 8 to 12 seconds. 4. Repeat 2 to 4 times. Shoulder internal rotation with towel   1. Hold a towel above and behind your head with the arm that is not sore. 2. With your sore arm, reach behind your back and grasp the towel. 3. With the arm above your head, pull the towel upward. Do this until you feel a stretch on the front and outside of your sore shoulder. 4. Hold 15 to 30 seconds. 5. Repeat 2 to 4 times. Shoulder blade squeeze   1. Stand with your arms at your sides, and squeeze your shoulder blades together. Do not raise your shoulders up as you squeeze. 2. Hold 6 seconds. 3. Repeat 8 to 12 times. Resisted rows   1. Put the band around a solid object at about waist level. (A bedpost will work well.) Each hand should hold an end of the band. 2. With your elbows at your sides and bent to 90 degrees, pull the band back. Your shoulder blades should move toward each other. Return to the starting position. 3. Repeat 8 to 12 times. External rotator strengthening exercise   1. Start by tying a piece of elastic exercise material to a doorknob. You can use surgical tubing or Thera-Band. (You may also hold one end of the band in each hand.)  2. Stand or sit with your shoulder relaxed and your elbow bent 90 degrees. Your upper arm should rest comfortably against your side. Squeeze a rolled towel between your elbow and your body for comfort. This will help keep your arm at your side. 3. Hold one end of the elastic band with the hand of the painful arm. 4. Start with your forearm across your belly. Slowly rotate the forearm out away from your body. Keep your elbow and upper arm tucked against the towel roll or the side of your body until you begin to feel tightness in your shoulder. Slowly move your arm back to where you started. 5. Repeat 8 to 12 times. Internal rotator strengthening exercise   1. Start by tying a piece of elastic exercise material to a doorknob. You can use surgical tubing or Thera-Band. 2. Stand or sit with your shoulder relaxed and your elbow bent 90 degrees. Your upper arm should rest comfortably against your side. Squeeze a rolled towel between your elbow and your body for comfort. This will help keep your arm at your side. 3. Hold one end of the elastic band in the hand of the painful arm. 4. Slowly rotate your forearm toward your body until it touches your belly. Slowly move it back to where you started. 5. Keep your elbow and upper arm firmly tucked against the towel roll or at your side. 6. Repeat 8 to 12 times. Pendulum swing   1. Hold on to a table or the back of a chair with your good arm. Then bend forward a little and let your sore arm hang straight down. This exercise does not use the arm muscles. Rather, use your legs and your hips to create movement that makes your arm swing freely.   2. Use the movement from your hips and legs to guide the slightly swinging arm back and forth like a pendulum (or elephant trunk). Then guide it in circles that start small (about the size of a dinner plate). Make the circles a bit larger each day, as your pain allows. 3. Do this exercise for 5 minutes, 5 to 7 times each day. 4. As you have less pain, try bending over a little farther to do this exercise. This will increase the amount of movement at your shoulder. Follow-up care is a key part of your treatment and safety. Be sure to make and go to all appointments, and call your doctor if you are having problems. It's also a good idea to know your test results and keep a list of the medicines you take. Where can you learn more? Go to https://Alter-G.Xoomsys. org and sign in to your [employer] account. Enter H562 in the [employer] box to learn more about \"Shoulder Arthritis: Exercises. \"     If you do not have an account, please click on the \"Sign Up Now\" link. Current as of: June 26, 2019  Content Version: 12.3  © 8317-6533 Healthwise, ShootHome. Care instructions adapted under license by [facility] ([facility]). If you have questions about a medical condition or this instruction, always ask your healthcare professional. [address] any warranty or liability for your use of this information. Patient Education        Shoulder Stretches: Exercises  Introduction  Here are some examples of exercises for you to try. The exercises may be suggested for a condition or for rehabilitation. Start each exercise slowly. Ease off the exercises if you start to have pain. You will be told when to start these exercises and which ones will work best for you. How to do the exercises  Shoulder stretch   1.  a doorway and place one arm against the door frame. Your elbow should be a little higher than your shoulder. 2. Relax your shoulders as you lean forward, allowing your chest and shoulder muscles to stretch.  You can also turn your body slightly away from your arm to stretch the muscles even more. 3. Hold for 15 to 30 seconds. 4. Repeat 2 to 4 times with each arm. Shoulder and chest stretch   1. Shoulder and chest stretch  2. While sitting, relax your upper body so you slump slightly in your chair. 3. As you breathe in, straighten your back and open your arms out to the sides. 4. Gently pull your shoulder blades back and downward. 5. Hold for 15 to 30 seconds as your breathe normally. 6. Repeat 2 to 4 times. Overhead stretch   1. Reach up over your head with both arms. 2. Hold for 15 to 30 seconds. 3. Repeat 2 to 4 times. Follow-up care is a key part of your treatment and safety. Be sure to make and go to all appointments, and call your doctor if you are having problems. It's also a good idea to know your test results and keep a list of the medicines you take. Where can you learn more? Go to https://Respectance.[employer]us [employer]Pharm. org and sign in to your [employer] account. Enter S254 in the [facility] box to learn more about \"Shoulder Stretches: Exercises. \"     If you do not have an account, please click on the \"Sign Up Now\" link. Current as of: June 26, 2019  Content Version: 12.3  © 0004-4597 Healthwise, Incorporated. Care instructions adapted under license by [facility] ([facility]). If you have questions about a medical condition or this instruction, always ask your healthcare professional. [place] [address] any warranty or liability for your use of this information. Patient Education        Shoulder Stretches: Exercises  Introduction  Here are some examples of exercises for you to try. The exercises may be suggested for a condition or for rehabilitation. Start each exercise slowly. Ease off the exercises if you start to have pain. You will be told when to start these exercises and which ones will work best for you. How to do the exercises  Shoulder stretch   5.   a doorway and place one arm against the door frame. Your elbow should be a little higher than your shoulder. 6. Relax your shoulders as you lean forward, allowing your chest and shoulder muscles to stretch. You can also turn your body slightly away from your arm to stretch the muscles even more. 7. Hold for 15 to 30 seconds. 8. Repeat 2 to 4 times with each arm. Shoulder and chest stretch   7. Shoulder and chest stretch  8. While sitting, relax your upper body so you slump slightly in your chair. 9. As you breathe in, straighten your back and open your arms out to the sides. 10. Gently pull your shoulder blades back and downward. 11. Hold for 15 to 30 seconds as your breathe normally. 12. Repeat 2 to 4 times. Overhead stretch   4. Reach up over your head with both arms. 5. Hold for 15 to 30 seconds. 6. Repeat 2 to 4 times. Follow-up care is a key part of your treatment and safety. Be sure to make and go to all appointments, and call your doctor if you are having problems. It's also a good idea to know your test results and keep a list of the medicines you take. Where can you learn more? Go to https://[email or web address] Medical.Seevibes. org and sign in to your [employer] account. Enter S254 in the [employer] box to learn more about \"Shoulder Stretches: Exercises. \"     If you do not have an account, please click on the \"Sign Up Now\" link. Current as of: June 26, 2019  Content Version: 12.3  © 5009-7336 Healthwise, Incorporated. Care instructions adapted under license by [facility] ([facility]). If you have questions about a medical condition or this instruction, always ask your healthcare professional. [place] [address] any warranty or liability for your use of this information.

## 2020-02-12 NOTE — PROGRESS NOTES
[facility] PRIMARY [facility]ARE  [address]  [address] [address]  Dept: [phone]  Dept Fax: [phone]    Tanja Carrasco is a 66 y.o. female who presents today for her medical conditions/complaints as noted below. Chief Complaint   Patient presents with    Follow-up       HPI:     64-year-old female who is here for regular follow-up. She has past medical history of essential hypertension, GERD, obesity, DAISY, hypothyroidism. She had elevated blood pressure when she arrived and the second blood pressure was at 130/70 and she reports that she has been having normal blood pressure at home. Next  She wants to make sure that she had oophorectomy done as she had had a total hysterectomy 13 to 5 years ago and she does not have the records. She would like to confirm the findings. She is complaining of right-sided shoulder pain which is been going on for few months and like to have some exercises for the same. Advised her to take Synthroid first and then wait half hour and 10 take Protonix. She is being followed by urology for urinary bladder cancer and reviewed her chart. No other complaints or concerns. And advised her to lose weight as her BMI is at 41.9. No results found for: LABA1C          ( goal A1C is < 7)   No results found for: LABMICR  LDL Calculated (mg/dL)   Date Value   01/13/2017 129   10/06/2015 83   08/20/2014 66       (goal LDL is <100)   BUN (mg/dL)   Date Value   02/07/2020 18     BP Readings from Last 3 Encounters:   02/12/20 130/70   01/21/20 (!) 145/65   01/21/20 (!) 130/56          (goal 120/80)    Past Medical History:   Diagnosis Date    AK (actinic keratosis)     from sun exposure    Anesthesia     HARD TIME WAKING UP \"SOMETIMES\".     Arthritis     Asymptomatic bilateral carotid artery stenosis 3/10/2015    Bladder cancer (HCC)     bladder, first time 1993    CAD (coronary artery disease)     Colon polyp Dr Kate Gallagher    Diarrhea     Difficult intubation     SMALL MOUTH AND AIRWAY    GERD (gastroesophageal reflux disease)     History of atrial fibrillation     not chronic    Hypertension     Hypothyroid     Obesity     DAISY (obstructive sleep apnea)     NO MACHINE, she has bad sinus issues and requested alternatives instead of a machine, nothing was done    PAC (premature atrial contraction)     occasionally    Stress incontinence     Wears glasses     FOR READING. Past Surgical History:   Procedure Laterality Date    BLADDER SURGERY  1993 and 10/2002    for bladder cancer treatment    BREAST BIOPSY      LT. BREAST (BENIGN). , large mass removed    CHOLECYSTECTOMY      COLONOSCOPY      3-4X    CORONARY ANGIOPLASTY  2014    3 stents inserted via heart cath    CYST REMOVAL Right     3RD FINGER.    CYSTOSCOPY      Dr Downey Clarity  [id number]    TURB-bladder tumors removed    CYSTOSCOPY  2016    fulgeration with biopsies    CYSTOSCOPY N/A 2020    CYSTO BOTOX INJECTION 100UNITS performed by Ann Chowdary MD at [address] [place] [address].  EYES.    HEMORRHOID SURGERY      HYSTERECTOMY, TOTAL ABDOMINAL      unsure if ovaries remain    OTHER SURGICAL HISTORY      abscess on tailbone    TONSILLECTOMY         Family History   Problem Relation Age of Onset   [de-identified] Cancer Mother         pancreatic age 80    Other Mother         Vascular disease    Cancer Father         bladder age 80    Heart Disease Maternal Grandmother     Heart Disease Maternal Grandfather     Heart Disease Paternal Grandfather     Cancer Paternal Aunt         ovarian       Social History     Tobacco Use    Smoking status: Former Smoker     Packs/day: 1.50     Years: 30.00     Pack years: 45.00     Types: Cigarettes     Last attempt to quit: 1992     Years since quittin.1    Smokeless tobacco: Never Used   Substance Use Topics    Alcohol use: Yes     Comment: COUPLE DRINKS PER WEEK. Current Outpatient Medications   Medication Sig Dispense Refill    furosemide (LASIX) 20 MG tablet TAKE ONE TABLET BY MOUTH DAILY AS NEEDED FOR PEDAL EDEMA 60 tablet 0    losartan (COZAAR) 100 MG tablet TAKE ONE TABLET BY MOUTH DAILY 90 tablet 1    levothyroxine (SYNTHROID) 50 MCG tablet TAKE ONE TABLET BY MOUTH DAILY 90 tablet 1    amLODIPine (NORVASC) 10 MG tablet Take 10 mg by mouth nightly       pantoprazole (PROTONIX) 40 MG tablet Take 40 mg by mouth daily       metoprolol tartrate (LOPRESSOR) 25 MG tablet Take 1 tablet by mouth 2 times daily 180 tablet 3    zolpidem (AMBIEN) 5 MG tablet Take by mouth nightly as needed.  clotrimazole-betamethasone (LOTRISONE) 1-0.05 % cream Apply externally to affected area twice daily prn 45 g 1    Ibuprofen-diphenhydrAMINE HCl (ADVIL PM) 200-25 MG CAPS Take by mouth nightly as needed      aspirin 81 MG EC tablet Take 81 mg by mouth daily      nitroGLYCERIN (NITROSTAT) 0.4 MG SL tablet Place 0.4 mg under the tongue every 5 minutes as needed for Chest pain.  Multiple Vitamins-Minerals (THERAPEUTIC MULTIVITAMIN-MINERALS) tablet Take 1 tablet by mouth daily. No current facility-administered medications for this visit. Allergies   Allergen Reactions    Atorvastatin Other (See Comments)     diarrhea    Other Other (See Comments)     Passed out and because of multiple issues with this combo drug, Penstrept was taken off market in 1960s, It contained Penicillin and Streptomycin in one pill. Patient states she has taken penicillin and/or streptomycin individually without problems.     Tobramycin Swelling    Molds & Smuts Other (See Comments)     coughing       Health Maintenance   Topic Date Due    Shingles Vaccine (1 of 2) 04/07/1993    DTaP/Tdap/Td vaccine (1 - Tdap) 08/12/2020 (Originally 4/7/1956)    Pneumococcal 65+ years Vaccine (1 of 1 - PPSV23) 08/12/2020 (Originally 4/7/2008)    Flu vaccine (1) 09/09/2020 (Originally Musculoskeletal: Full passive range of motion without pain, normal range of motion and neck supple. Thyroid: No thyroid mass or thyromegaly. Vascular: No JVD. Trachea: Phonation normal.   Cardiovascular:      Rate and Rhythm: Normal rate and regular rhythm. Pulses: No decreased pulses. Carotid pulses are 2+ on the right side and 2+ on the left side. Radial pulses are 2+ on the right side and 2+ on the left side. Heart sounds: Normal heart sounds. No murmur. Pulmonary:      Effort: Pulmonary effort is normal. No accessory muscle usage or respiratory distress. Breath sounds: Normal breath sounds. No wheezing or rales. Abdominal:      General: Bowel sounds are normal. There is no distension. Palpations: Abdomen is soft. Tenderness: There is no abdominal tenderness. Musculoskeletal: Normal range of motion. General: No tenderness or deformity. Lymphadenopathy:      Cervical: No cervical adenopathy. Skin:     General: Skin is warm. Capillary Refill: Capillary refill takes less than 2 seconds. Findings: No rash. Neurological:      Mental Status: She is alert and oriented to person, place, and time. Deep Tendon Reflexes: Reflexes normal.   Psychiatric:         Behavior: Behavior normal.       /70   Pulse 93   Resp 16   Wt 234 lb 12.8 oz (106.5 kg)   SpO2 98%   BMI 41.93 kg/m²     Assessment:          1. Chronic right shoulder pain  Exercise given    2. Essential hypertension  Controlled    3. Hx of total hysterectomy    - US Non OB Transvaginal; Future    4. Malignant neoplasm of urinary bladder, unspecified site [facility])  Follow-up with urology  Cancerous lesion shaved and off Botox treatment at this time    5. Gastroesophageal reflux disease, esophagitis presence not specified  Protonix    6.  Class 3 severe obesity with serious comorbidity and body mass index (BMI) of 40.0 to 44.9 in adult, unspecified obesity type ([address]  Diet and exercise            Diagnosis Orders   1. Chronic right shoulder pain     2. Essential hypertension     3. Hx of total hysterectomy  US Non OB Transvaginal   4. Malignant neoplasm of urinary bladder, unspecified site ([address]     5. Gastroesophageal reflux disease, esophagitis presence not specified     6. Class 3 severe obesity with serious comorbidity and body mass index (BMI) of 40.0 to 44.9 in adult, unspecified obesity type ([address]             Plan:      Return in about 3 months (around 5/12/2020) for Routine follow-up. Orders Placed This Encounter   Procedures    US Non OB Transvaginal     Standing Status:   Future     Standing Expiration Date:   2/11/2021     Order Specific Question:   Reason for exam:     Answer:   r/o oophorectomy     No orders of the defined types were placed in this encounter. Patient given educational materials - see patient instructions. Discussed use, benefit, and side effects of prescribedmedications. All patient questions answered. Pt voiced understanding. Reviewed health maintenance. Instructed to continue current medications, diet and exercise. Patient agreed with treatment plan. Follow up as directed. I spent a total of 25 minutes face to face with this patient. Over 50% of that time was spent on counseling and care coordination. Please see assessment and plan for details. Electronically signed by Bety Flores MD on 2/12/2020 at 9:44 AM      Please note that this chart was generated using voice recognition Dragon dictation software. Although every effort was made to ensure the accuracy of this automatedtranscription, some errors in transcription may have occurred.

## 2020-02-17 ENCOUNTER — HOSPITAL ENCOUNTER (OUTPATIENT)
Dept: ULTRASOUND IMAGING | Age: 75
Discharge: HOME | End: 2020-02-19
Payer: MEDICARE

## 2020-02-17 ENCOUNTER — TELEPHONE (OUTPATIENT)
Dept: PRIMARY CARE CLINIC | Age: 75
End: 2020-02-17

## 2020-02-17 PROCEDURE — 76856 US EXAM PELVIC COMPLETE: CPT

## 2020-02-17 PROCEDURE — 76830 TRANSVAGINAL US NON-OB: CPT

## 2020-02-17 NOTE — TELEPHONE ENCOUNTER
LISETTE Iqbal from [facility] called needing additional order to transvaginal US-needed US pelvic complete order. Writer placed order since appt was at 9:30am today.

## 2020-02-26 NOTE — TELEPHONE ENCOUNTER
Left message with patient to return call and schedule follow up appointment from Botox done on 1/21/2020, she is due now.

## 2020-03-06 ENCOUNTER — OFFICE VISIT (OUTPATIENT)
Dept: UROLOGY | Age: 75
End: 2020-03-06
Payer: MEDICARE

## 2020-03-06 VITALS
BODY MASS INDEX: 43.98 KG/M2 | TEMPERATURE: 98.3 F | HEIGHT: 62 IN | SYSTOLIC BLOOD PRESSURE: 160 MMHG | WEIGHT: 239 LBS | DIASTOLIC BLOOD PRESSURE: 90 MMHG

## 2020-03-06 PROCEDURE — 0509F URINE INCON PLAN DOCD: CPT | Performed by: NURSE PRACTITIONER

## 2020-03-06 PROCEDURE — G8484 FLU IMMUNIZE NO ADMIN: HCPCS | Performed by: NURSE PRACTITIONER

## 2020-03-06 PROCEDURE — G8400 PT W/DXA NO RESULTS DOC: HCPCS | Performed by: NURSE PRACTITIONER

## 2020-03-06 PROCEDURE — G8427 DOCREV CUR MEDS BY ELIG CLIN: HCPCS | Performed by: NURSE PRACTITIONER

## 2020-03-06 PROCEDURE — 1123F ACP DISCUSS/DSCN MKR DOCD: CPT | Performed by: NURSE PRACTITIONER

## 2020-03-06 PROCEDURE — 99213 OFFICE O/P EST LOW 20 MIN: CPT | Performed by: NURSE PRACTITIONER

## 2020-03-06 PROCEDURE — G8417 CALC BMI ABV UP PARAM F/U: HCPCS | Performed by: NURSE PRACTITIONER

## 2020-03-06 PROCEDURE — 4040F PNEUMOC VAC/ADMIN/RCVD: CPT | Performed by: NURSE PRACTITIONER

## 2020-03-06 PROCEDURE — 51798 US URINE CAPACITY MEASURE: CPT | Performed by: NURSE PRACTITIONER

## 2020-03-06 PROCEDURE — 1036F TOBACCO NON-USER: CPT | Performed by: NURSE PRACTITIONER

## 2020-03-06 PROCEDURE — 1090F PRES/ABSN URINE INCON ASSESS: CPT | Performed by: NURSE PRACTITIONER

## 2020-03-06 RX ORDER — FLUOROURACIL 50 MG/G
CREAM TOPICAL
COMMUNITY
Start: 2020-02-12 | End: 2020-05-13 | Stop reason: ALTCHOICE

## 2020-03-06 ASSESSMENT — ENCOUNTER SYMPTOMS
ABDOMINAL PAIN: 0
BACK PAIN: 0
COUGH: 0
COLOR CHANGE: 0
WHEEZING: 0
VOMITING: 0
NAUSEA: 0
EYE PAIN: 0
EYE REDNESS: 0
SHORTNESS OF BREATH: 0

## 2020-03-06 NOTE — LETTER
[address] [address]  [address] [facility]  Dept: [phone]  Dept Fax: [phone]        3/6/20    Patient: Dequan Segura  YOB: 1945    Dear Ricci Quiles MD,    I had the pleasure of seeing one of your patients, Astrid Angela today in the office today. Below are the relevant portions of my assessment and plan of care. IMPRESSION:  1. Mixed incontinence urge and stress    2. OAB (overactive bladder)        PLAN:  Post Botox 100 units: doing well- is very pleased. Timed and double voiding every 2 hours during the daym decrease fluids 2 hours 2 hours befoe bed, cut back on coffee. Follow up in 3 months, call with worsening symptoms   No follow-ups on file. Prescriptions Ordered:  No orders of the defined types were placed in this encounter. Orders Placed:  Orders Placed This Encounter   Procedures    SD MEASUREMENT,POST-VOID RESIDUAL VOLUME BY US,NON-IMAGING         Thank you for allowing me to participate in the care of this patient. I will keep you updated on this patient's follow up and I look forward to serving you and your patients again in the future.     HARESH Limon - CNP

## 2020-03-06 NOTE — PROGRESS NOTES
results:     PAST MEDICAL, FAMILY AND SOCIAL HISTORY UPDATE:  Past Medical History:   Diagnosis Date    AK (actinic keratosis)     from sun exposure    Anesthesia     HARD TIME WAKING UP \"SOMETIMES\".  Arthritis     Asymptomatic bilateral carotid artery stenosis 3/10/2015    Bladder cancer (HCC)     bladder, first time 1993    CAD (coronary artery disease)     Colon polyp     Dr Elizalde Sas    Diarrhea     Difficult intubation     SMALL MOUTH AND AIRWAY    GERD (gastroesophageal reflux disease)     History of atrial fibrillation     not chronic    Hypertension     Hypothyroid     Obesity     DAISY (obstructive sleep apnea)     NO MACHINE, she has bad sinus issues and requested alternatives instead of a machine, nothing was done    PAC (premature atrial contraction)     occasionally    Stress incontinence     Wears glasses     FOR READING. Past Surgical History:   Procedure Laterality Date    BLADDER SURGERY  8/1993 and 10/2002    for bladder cancer treatment    BREAST BIOPSY      LT. BREAST (BENIGN). , large mass removed    CHOLECYSTECTOMY      COLONOSCOPY      3-4X    CORONARY ANGIOPLASTY  07/03/2014    3 stents inserted via heart cath    CYST REMOVAL Right     3RD FINGER.    CYSTOSCOPY  2014    Dr Kelle Medellin  [id number]    TURB-bladder tumors removed    CYSTOSCOPY  11/18/2016    fulgeration with biopsies    CYSTOSCOPY N/A 1/21/2020    CYSTO BOTOX INJECTION 100UNITS performed by Guru Canales MD at [address] [place] [address].  EYES.    HEMORRHOID SURGERY      HYSTERECTOMY, TOTAL ABDOMINAL  1999    unsure if ovaries remain    OTHER SURGICAL HISTORY      abscess on tailbone    TONSILLECTOMY       Family History   Problem Relation Age of Onset   Ericran Porter Cancer Mother         pancreatic age 80    Other Mother         Vascular disease    Cancer Father         bladder age 80    Heart Disease Maternal Grandmother     Heart Disease Maternal Grandfather     Heart Disease Paternal Grandfather     Cancer Paternal Aunt         ovarian     Outpatient Medications Marked as Taking for the 3/6/20 encounter (Office Visit) with HARESH Brady - CNP   Medication Sig Dispense Refill    fluorouracil (EFUDEX) 5 % cream       furosemide (LASIX) 20 MG tablet TAKE ONE TABLET BY MOUTH DAILY AS NEEDED FOR PEDAL EDEMA 60 tablet 0    losartan (COZAAR) 100 MG tablet TAKE ONE TABLET BY MOUTH DAILY 90 tablet 1    levothyroxine (SYNTHROID) 50 MCG tablet TAKE ONE TABLET BY MOUTH DAILY 90 tablet 1    amLODIPine (NORVASC) 10 MG tablet Take 10 mg by mouth nightly       pantoprazole (PROTONIX) 40 MG tablet Take 40 mg by mouth daily       metoprolol tartrate (LOPRESSOR) 25 MG tablet Take 1 tablet by mouth 2 times daily 180 tablet 3    zolpidem (AMBIEN) 5 MG tablet Take by mouth nightly as needed.  clotrimazole-betamethasone (LOTRISONE) 1-0.05 % cream Apply externally to affected area twice daily prn 45 g 1    Ibuprofen-diphenhydrAMINE HCl (ADVIL PM) 200-25 MG CAPS Take by mouth nightly as needed      aspirin 81 MG EC tablet Take 81 mg by mouth daily      nitroGLYCERIN (NITROSTAT) 0.4 MG SL tablet Place 0.4 mg under the tongue every 5 minutes as needed for Chest pain.  Multiple Vitamins-Minerals (THERAPEUTIC MULTIVITAMIN-MINERALS) tablet Take 1 tablet by mouth daily. (All medications reviewed and updated by provider sincelast office visit or hospitalization)   Atorvastatin; Other; Tobramycin; and Molds & smuts  Social History     Tobacco Use   Smoking Status Former Smoker    Packs/day: 1.50    Years: 30.00    Pack years: 45.00    Types: Cigarettes    Last attempt to quit: 1992    Years since quittin.1   Smokeless Tobacco Never Used      (If patient a smoker, smoking cessation counseling offered)     Social History     Substance and Sexual Activity   Alcohol Use Yes    Comment: COUPLE DRINKS PER WEEK.        REVIEW OF SYSTEMS:  Review of Systems      Physical Exam: Vitals:    03/06/20 0841   BP: (!) 160/90   Temp: 98.3 °F (36.8 °C)     Body mass index is 43.71 kg/m². Patient is a 66 y.o. female in noacute distress and alert and oriented to person, place and time. Physical Exam  Constitutional: Patient in no acute distress. Neuro: Alert andoriented to person, place and time. Psych: Mood normal, affect normal  Skin: No rash noted  HEENT: Head: Normocephalic and atraumatic  Conjunctivae and EOM are normal. Pupils are equal, round  Nose: Normal  Right External Ear: Normal; Left External Ear: Normal  Mouth: Mucosa Moist  Neck: Supple  Lungs: Respiratory effort is normal  Cardiovascular: Warm & Pink  Abdomen: Soft, non-tender, non-distended   Bladder non-tender and not distended. PVR 30 ml   Musculoskeletal: Normalgait and station      and Plan      1. Mixed incontinence urge and stress    2. OAB (overactive bladder)           Plan:    doing well- is very pleased. PVR 30 ml     Timed and double voiding every 2 hours during the daym decrease fluids 2 hours 2 hours befoe bed, cut back on coffee. Follow up in 3 months, call with worsening   No follow-ups on file. Prescriptions Ordered:  No orders of the defined types were placed in this encounter. Orders Placed:  Orders Placed This Encounter   Procedures    IL MEASUREMENT,POST-VOID RESIDUAL VOLUME BY US,NON-IMAGING            HARESH Krueger CNP    Reviewed and Agree with the ROS entered by the MA.

## 2020-03-10 RX ORDER — FUROSEMIDE 20 MG/1
TABLET ORAL
Qty: 60 TABLET | Refills: 0 | Status: SHIPPED | OUTPATIENT
Start: 2020-03-10 | End: 2020-05-05

## 2020-03-25 PROBLEM — K21.9 GERD (GASTROESOPHAGEAL REFLUX DISEASE): Status: RESOLVED | Noted: 2020-03-25 | Resolved: 2020-03-24

## 2020-05-05 RX ORDER — FUROSEMIDE 20 MG/1
TABLET ORAL
Qty: 60 TABLET | Refills: 0 | Status: SHIPPED | OUTPATIENT
Start: 2020-05-05

## 2020-05-06 ENCOUNTER — TELEPHONE (OUTPATIENT)
Dept: PRIMARY CARE CLINIC | Age: 75
End: 2020-05-06

## 2020-05-13 ENCOUNTER — OFFICE VISIT (OUTPATIENT)
Dept: PRIMARY CARE CLINIC | Age: 75
End: 2020-05-13
Payer: MEDICARE

## 2020-05-13 VITALS
BODY MASS INDEX: 43.16 KG/M2 | OXYGEN SATURATION: 98 % | RESPIRATION RATE: 18 BRPM | WEIGHT: 236 LBS | SYSTOLIC BLOOD PRESSURE: 130 MMHG | HEART RATE: 82 BPM | DIASTOLIC BLOOD PRESSURE: 80 MMHG

## 2020-05-13 PROCEDURE — 1123F ACP DISCUSS/DSCN MKR DOCD: CPT | Performed by: INTERNAL MEDICINE

## 2020-05-13 PROCEDURE — G8417 CALC BMI ABV UP PARAM F/U: HCPCS | Performed by: INTERNAL MEDICINE

## 2020-05-13 PROCEDURE — G8400 PT W/DXA NO RESULTS DOC: HCPCS | Performed by: INTERNAL MEDICINE

## 2020-05-13 PROCEDURE — G8427 DOCREV CUR MEDS BY ELIG CLIN: HCPCS | Performed by: INTERNAL MEDICINE

## 2020-05-13 PROCEDURE — 4040F PNEUMOC VAC/ADMIN/RCVD: CPT | Performed by: INTERNAL MEDICINE

## 2020-05-13 PROCEDURE — 1036F TOBACCO NON-USER: CPT | Performed by: INTERNAL MEDICINE

## 2020-05-13 PROCEDURE — 1090F PRES/ABSN URINE INCON ASSESS: CPT | Performed by: INTERNAL MEDICINE

## 2020-05-13 PROCEDURE — 99214 OFFICE O/P EST MOD 30 MIN: CPT | Performed by: INTERNAL MEDICINE

## 2020-05-13 RX ORDER — LEVOTHYROXINE SODIUM 0.05 MG/1
TABLET ORAL
Qty: 90 TABLET | Refills: 1 | Status: CANCELLED | OUTPATIENT
Start: 2020-05-13

## 2020-05-13 RX ORDER — LEVOTHYROXINE SODIUM 0.05 MG/1
TABLET ORAL
Qty: 90 TABLET | Refills: 1 | Status: SHIPPED | OUTPATIENT
Start: 2020-05-13 | End: 2020-11-09

## 2020-05-13 NOTE — PROGRESS NOTES
[facility] PR[facility]  [address]   [address]  [address] [address]  Dept: [phone]  Dept Fax: [phone]    Roman Vargas is a 66 y.o. female who presents today for her medical conditions/complaints as noted below. Chief Complaint   Patient presents with    Follow-up     3m fu       HPI:     This is a 47-year-old female who is here for regular follow-up. She has past medical of essential hypertension, DAISY, hypothyroidism, obesity and osteoarthritis. Next  She is currently following Dr. Angelia Alan and she is here to get stem cell injection in her right knee as per the patient. Blood pressures well controlled and she is determined to lose weight and wants referral to bariatric surgery. She cannot get knee replaced till she has weight loss. Next  Reviewed all the meds and updated the list.  No other complaints or concerns. No results found for: LABA1C          ( goal A1C is < 7)   No results found for: LABMICR  LDL Calculated (mg/dL)   Date Value   01/13/2017 129   10/06/2015 83   08/20/2014 66       (goal LDL is <100)   BUN (mg/dL)   Date Value   02/07/2020 18     BP Readings from Last 3 Encounters:   05/13/20 130/80   03/06/20 (!) 160/90   02/12/20 130/70          (goal 120/80)    Past Medical History:   Diagnosis Date    AK (actinic keratosis)     from sun exposure    Anesthesia     HARD TIME WAKING UP \"SOMETIMES\".     Arthritis     Asymptomatic bilateral carotid artery stenosis 3/10/2015    Bladder cancer (HCC)     bladder, first time 1993    CAD (coronary artery disease)     Colon polyp     Dr Robert Garza    Diarrhea     Difficult intubation     SMALL MOUTH AND AIRWAY    GERD (gastroesophageal reflux disease)     History of atrial fibrillation     not chronic    Hypertension     Hypothyroid     Obesity     DAISY (obstructive sleep apnea)     NO MACHINE, she has bad sinus issues and requested alternatives instead of a machine, nothing  amLODIPine (NORVASC) 10 MG tablet Take 10 mg by mouth nightly       pantoprazole (PROTONIX) 40 MG tablet Take 40 mg by mouth daily       metoprolol tartrate (LOPRESSOR) 25 MG tablet Take 1 tablet by mouth 2 times daily 180 tablet 3    zolpidem (AMBIEN) 5 MG tablet Take by mouth nightly as needed.  clotrimazole-betamethasone (LOTRISONE) 1-0.05 % cream Apply externally to affected area twice daily prn 45 g 1    Ibuprofen-diphenhydrAMINE HCl (ADVIL PM) 200-25 MG CAPS Take by mouth nightly as needed      aspirin 81 MG EC tablet Take 81 mg by mouth daily      nitroGLYCERIN (NITROSTAT) 0.4 MG SL tablet Place 0.4 mg under the tongue every 5 minutes as needed for Chest pain.  Multiple Vitamins-Minerals (THERAPEUTIC MULTIVITAMIN-MINERALS) tablet Take 1 tablet by mouth daily. No current facility-administered medications for this visit. Allergies   Allergen Reactions    Atorvastatin Other (See Comments)     diarrhea    Other Other (See Comments)     Passed out and because of multiple issues with this combo drug, Penstrept was taken off market in 1960s, It contained Penicillin and Streptomycin in one pill. Patient states she has taken penicillin and/or streptomycin individually without problems.     Tobramycin Swelling    Molds & Smuts Other (See Comments)     coughing       Health Maintenance   Topic Date Due    Shingles Vaccine (1 of 2) 04/07/1993    DTaP/Tdap/Td vaccine (1 - Tdap) 08/12/2020 (Originally 4/7/1964)    Pneumococcal 65+ years Vaccine (1 of 1 - PPSV23) 08/12/2020 (Originally 4/7/2008)    Flu vaccine (Season Ended) 09/09/2020 (Originally 9/1/2020)    Annual Wellness Visit (AWV)  09/09/2020    TSH testing  02/07/2021    Potassium monitoring  02/07/2021    Creatinine monitoring  02/07/2021    DEXA (modify frequency per FRAX score)  Addressed    Hepatitis A vaccine  Aged Out    Hepatitis B vaccine  Aged Out    Hib vaccine  Aged Out    Meningococcal (ACWY) vaccine  Aged Out       Subjective:      Review of Systems   Constitutional: Negative for activity change, appetite change, chills, fatigue and fever. HENT: Negative for congestion, ear pain, hearing loss, nosebleeds, sinus pressure, sinus pain and sneezing. Eyes: Negative for visual disturbance. Respiratory: Negative for cough, shortness of breath and wheezing. Cardiovascular: Negative for chest pain and palpitations. Gastrointestinal: Negative for abdominal distention, abdominal pain, constipation, diarrhea, nausea and vomiting. Endocrine: Negative for cold intolerance, heat intolerance, polydipsia, polyphagia and polyuria. Genitourinary: Negative for difficulty urinating, menstrual problem, vaginal bleeding and vaginal discharge. Musculoskeletal: Positive for arthralgias (R knee pain). Negative for back pain and joint swelling. Skin: Negative for rash. Neurological: Negative for numbness and headaches. Psychiatric/Behavioral: Negative for sleep disturbance. The patient is not nervous/anxious. All other systems reviewed and are negative. Objective:     Physical Exam  Vitals signs and nursing note reviewed. Constitutional:       General: She is not in acute distress. Appearance: She is well-developed. She is not diaphoretic. HENT:      Head: Normocephalic and atraumatic. Right Ear: Hearing normal.      Left Ear: Hearing normal.      Mouth/Throat:      Pharynx: Uvula midline. Eyes:      General: No scleral icterus. Conjunctiva/sclera: Conjunctivae normal.      Pupils: Pupils are equal, round, and reactive to light. Neck:      Musculoskeletal: Full passive range of motion without pain, normal range of motion and neck supple. Thyroid: No thyroid mass or thyromegaly. Vascular: No JVD. Trachea: Phonation normal.   Cardiovascular:      Rate and Rhythm: Normal rate and regular rhythm. Pulses: No decreased pulses.            Carotid pulses are 2+ on the right side Plan:      Return for annual exam.    Orders Placed This Encounter   Procedures   One Marquez Gama DO [place], General Surgery, [place]     Referral Priority:   Routine     Referral Type:   Eval and Treat     Referral Reason:   Specialty Services Required     Requested Specialty:   General Surgery     Number of Visits Requested:   1     Orders Placed This Encounter   Medications    levothyroxine (SYNTHROID) 50 MCG tablet     Sig: TAKE ONE TABLET BY MOUTH DAILY     Dispense:  90 tablet     Refill:  1         Patient given educational materials - see patient instructions. Discussed use, benefit, and side effects of prescribedmedications. All patient questions answered. Pt voiced understanding. Reviewed health maintenance. Instructed to continue current medications, diet and exercise. Patient agreed with treatment plan. Follow up as directed. I spent a total of 25 minutes face to face with this patient. Over 50% of that time was spent on counseling and care coordination. Please see assessment and plan for details. Electronically signed by Ricci Giron MD on 5/17/2020 at 3:47 PM      Please note that this chart was generated using voice recognition Dragon dictation software. Although every effort was made to ensure the accuracy of this automatedtranscription, some errors in transcription may have occurred.

## 2020-05-17 PROBLEM — M17.11 PRIMARY OSTEOARTHRITIS OF RIGHT KNEE: Status: ACTIVE | Noted: 2020-05-17

## 2020-05-17 ASSESSMENT — ENCOUNTER SYMPTOMS
SINUS PAIN: 0
DIARRHEA: 0
NAUSEA: 0
ABDOMINAL PAIN: 0
COUGH: 0
BACK PAIN: 0
CONSTIPATION: 0
WHEEZING: 0
VOMITING: 0
SINUS PRESSURE: 0
SHORTNESS OF BREATH: 0
ABDOMINAL DISTENTION: 0

## 2020-06-04 ENCOUNTER — TELEPHONE (OUTPATIENT)
Dept: BARIATRICS/WEIGHT MGMT | Age: 75
End: 2020-06-04

## 2020-06-04 RX ORDER — LOSARTAN POTASSIUM 100 MG/1
100 TABLET ORAL DAILY
Qty: 90 TABLET | Refills: 0 | Status: SHIPPED | OUTPATIENT
Start: 2020-06-04 | End: 2020-12-08

## 2020-06-04 NOTE — TELEPHONE ENCOUNTER
Information Session Completed:  on 6/3/20 okay to schedule with Dr. Cece Dotson   [facility]    Patient informed the following: This is NOT a guarantee of payment  When stating that you have a Benefit or Coverage for Bariatric Surgery - that means that you may qualify for the surgery  Bariatric Surgery is considered an elective procedure, patient is responsible to know their benefits . Any information we obtain when calling your insurance  is not  a guarantee of  coverage  and/or  benefit. Appointment Note :   New Patient , /[facility],   6   month visits,  PG Fee $200,  Mailed Packet or advised to arrive  early      Remind Patient of $200 Program fee with $ 100 required at Second visit with office on initial dietician visit. Remind Patient they must be nicotine free. They will be tested at the beginning of the program and prior to surgery. Advise Patient Responsible for out of pocket, copay at medical visits, Deductible and coinsurance applied to medical visits and procedure. You will be responsible for any of the following:  · Copays   · Deductibles   · Co insurances     The items mentioned above are indicated or required by your insurance plan. Your deductible and coinsurance are applied to medical visits and procedures. Verified with patient if he or she has had any previous bariatric surgery? no  ( If yes ,advise patient of transfer of care process and program fee)       .

## 2020-06-04 NOTE — TELEPHONE ENCOUNTER
Malignant tumor of urinary bladder (HCC)     Cyst of breast     Atherosclerosis of native coronary artery of native heart without angina pectoris     Polyp of colon     Helton's esophagus without dysplasia     Gastroesophageal reflux disease     Primary osteoarthritis of right knee

## 2020-06-18 ENCOUNTER — OFFICE VISIT (OUTPATIENT)
Dept: BARIATRICS/WEIGHT MGMT | Age: 75
End: 2020-06-18
Payer: MEDICARE

## 2020-06-18 VITALS
HEIGHT: 64 IN | DIASTOLIC BLOOD PRESSURE: 80 MMHG | BODY MASS INDEX: 40.29 KG/M2 | TEMPERATURE: 97.1 F | WEIGHT: 236 LBS | HEART RATE: 64 BPM | SYSTOLIC BLOOD PRESSURE: 142 MMHG

## 2020-06-18 PROCEDURE — G8427 DOCREV CUR MEDS BY ELIG CLIN: HCPCS | Performed by: SURGERY

## 2020-06-18 PROCEDURE — 1090F PRES/ABSN URINE INCON ASSESS: CPT | Performed by: SURGERY

## 2020-06-18 PROCEDURE — 1123F ACP DISCUSS/DSCN MKR DOCD: CPT | Performed by: SURGERY

## 2020-06-18 PROCEDURE — 99203 OFFICE O/P NEW LOW 30 MIN: CPT | Performed by: SURGERY

## 2020-06-18 PROCEDURE — 1036F TOBACCO NON-USER: CPT | Performed by: SURGERY

## 2020-06-18 PROCEDURE — G8400 PT W/DXA NO RESULTS DOC: HCPCS | Performed by: SURGERY

## 2020-06-18 PROCEDURE — G8417 CALC BMI ABV UP PARAM F/U: HCPCS | Performed by: SURGERY

## 2020-06-18 PROCEDURE — 4040F PNEUMOC VAC/ADMIN/RCVD: CPT | Performed by: SURGERY

## 2020-06-28 NOTE — PROGRESS NOTES
alternatives instead of a machine, nothing was done   [facility] (premature atrial contraction)     occasionally    Stress incontinence     Wears glasses     FOR READING. Surgical History:  Past Surgical History:   Procedure Laterality Date    BLADDER SURGERY  1993 and 10/2002    for bladder cancer treatment    BREAST BIOPSY      LT. BREAST (BENIGN). , large mass removed    CHOLECYSTECTOMY      COLONOSCOPY      3-4X    CORONARY ANGIOPLASTY  2014    3 stents inserted via heart cath    CYST REMOVAL Right     3RD FINGER.    CYSTOSCOPY      Dr Tejal Feldman  [birth year]    TURB-bladder tumors removed    CYSTOSCOPY  2016    fulgeration with biopsies    CYSTOSCOPY N/A 2020    CYSTO BOTOX INJECTION 100UNITS performed by Nadia Nichols MD at [address] [place] [address]. EYES.    HEMORRHOID SURGERY      HYSTERECTOMY, TOTAL ABDOMINAL      unsure if ovaries remain    OTHER SURGICAL HISTORY      abscess on tailbone    TONSILLECTOMY         Family History:      Problem Relation Age of Onset    Cancer Mother         pancreatic age 80    Other Mother         Vascular disease    Cancer Father         bladder age 80    Heart Disease Maternal Grandmother     Heart Disease Maternal Grandfather     Heart Disease Paternal Grandfather     Cancer Paternal Aunt         ovarian       Social History:   Social History     Tobacco Use    Smoking status: Former Smoker     Packs/day: 1.50     Years: 30.00     Pack years: 45.00     Types: Cigarettes     Last attempt to quit: 1992     Years since quittin.5    Smokeless tobacco: Never Used   Substance Use Topics    Alcohol use: Yes     Comment: COUPLE DRINKS PER WEEK.     Drug use: No       Current Med List:  Current Outpatient Medications   Medication Sig Dispense Refill    losartan (COZAAR) 100 MG tablet Take 1 tablet by mouth daily 90 tablet 0    levothyroxine (SYNTHROID) 50 MCG tablet TAKE ONE TABLET BY MOUTH DAILY Dizziness/Blackouts/Fainting   []   [] Depression   []   []   Memory Impairement   []   [] Bipolar   []   []   Parkinson's   []   [] Anxiety disorder   []   []           Genitourinary/Gyn YES NO Skin Intact   [x]   []   Urinary Infection   []   []      Stones   []   [] Sleep   YES NO   Kidney Disease   []   [] Excessive daytime sleepiness   [x]   []   Incontinent   []   [] Snoring   [x]   []   Irregular menstrual cycles   []   [] Unrefreshed sleep   [x]   []   Possibly Pregnant? []     [] Other:      Date of LMP:   Preferred location:            PRESENT ILLNESS:     Weight Parameters  Weight 236 lb (107 kg)   Height 5' 3.5\" (1.613 m)   BMI Body mass index is 41.15 kg/m². IBW     EBW               FALLS ASSESSMENT    [x] LOW RISK FOR FALLS    [] MODERATE RISK FOR FALLS    [] Difficulty walking/selfcare    [] Falls in the past 2 months    [] Suspicion of Clinician    [] Other:      SMOKING CESSATION     [x] Not needed     [] Instructed to stop smoking    [] Pamphlet community resources given     VTE SCREEN    [] Family hx DVT/PE  /   [] Personal hx of DVT/PE    [x] Denies any family or personal hx of DVT/PE    Physician Review    [x] Past medical, family, & social history reviewed and discussed with patient. Review of surgery and post-surgical changes (by surgeon for surgical patients only)    [x] Lifelong diet expectations reviewed with patient    [x] Need for lifelong vitamin supplementation reviewed with patient    PHYSICAL EXAMINATION:      BP (!) 142/80   Pulse 64   Temp 97.1 °F (36.2 °C)   Ht 5' 3.5\" (1.613 m)   Wt 236 lb (107 kg)   BMI 41.15 kg/m²     Constitutional:  Vital signs are normal. The patient appears well-developed   HEENT:      Head: Normocephalic. Atraumatic     Eyes: pupils are equal and reactive. No scleral icterus is present. Neck: No mass and no thyromegaly present.    Cardiovascular: Normal rate, regular rhythm, S1 normal and S2 normal.  Bilateral pulses present. Pulmonary/Chest: Effort normal and breath sounds normal. No retractions. Abdominal: Soft. Normal appearance. There is no organomegaly. No tenderness. There is no rigidity, no rebound, no guarding and no Payne's sign. Musculoskeletal:      Right lower leg: Normal. No tenderness and no edema. Left lower leg: Normal. No tenderness and no edema. Lymphadenopathy:     No cervical adenopathy, No Exrtemity Adenopathy. Neurological: The patient is alert and oriented. Moving all four extremities equally, sensation grossly intact bilateral.  Skin: Skin is warm, dry and intact. Psychiatric: The patient has a normal mood and affect. Speech is normal and behavior is normal. Judgment and thought content normal. Cognition and memory are normal.     RECOMMENDATIONS:     We spent a great deal of time discussing the risks and benefits of Mery-en-Y Gastric Bypass, including but not limited to injury to intra-abdominal organs, breakdown of the gastric staple line, the need for re-operative therapy,  prolonged hospitalization,  mechanical ventilation, and death. We discussed the possibility of bleeding, the need for blood transfusions, blood clots, hospital-acquired and intra-abdominal infection, anastomotic stricture, and worsening GERD. And we discussed the need for post-operative visit compliance, behavior modifications and diet changes, protein and vitamin supplementation, as well as routine scheduled and dedicated exercise. I instructed the patient to utilize the exercise log that will be given to them at their fist dietician appointment. We discussed the potential weight loss benefit of approximately 60-70% of her excess body weight at 12-18 months post-op, as well as the possibility of insufficient weight loss or weight gain after 2 years post-operative time. PLAN:       Diagnosis Orders   1.  Hiatal hernia with GERD  FL Upper Gi W Air Contrast          Initial Testing     Primary Procedure: Mery-en-Y Gastric Bypass     Other Procedures:None    Labwork: Initial Pre-surgical Lab Tests (CMP, TSH, Fasting Lipid Profile, Mg, Zinc, Vit B1 (whole blood), Vit B12, 25-OH Vit D, Fe,  Ferritin,  Folate) and Negative serum nicotine prior to submission for pre-auth    Imaging: None    Endoscopic Studies: None    Psychological Assessment: Psychological Evaluation and Clearance    Nutrition Assessment: Bariatric Nutrition Assessment and Clearance    Cardiology Risk Stratification:  Cardiology consult for clearance    Pulmonary Evaluation: CPAP Settings    Other  Consultations: PCP clearance    Physician Supervised Diet and Exercise required by the patients insurance company: 6 months. Surgical Diet requirement:  2 weeks    Final Testing  Screening Chest Xray  and EKG within 6 months of date of surgery    Labwork:  Final Lab Tests  within 3 months of date of surgery (CBC, PT/PTT, BMP)    We discussed the risks of surgery given her age. She is going to pursue medical weight loss at this time. I did offer reflux surgery/evaluation for her GERD. She will follow up PRN.       Electronically signed by Harjeet Orellana DO on 6/28/2020 at 4:23 PM

## 2020-07-08 ENCOUNTER — OFFICE VISIT (OUTPATIENT)
Dept: PRIMARY CARE CLINIC | Age: 75
End: 2020-07-08
Payer: MEDICARE

## 2020-07-08 VITALS
OXYGEN SATURATION: 98 % | DIASTOLIC BLOOD PRESSURE: 64 MMHG | SYSTOLIC BLOOD PRESSURE: 126 MMHG | RESPIRATION RATE: 18 BRPM | TEMPERATURE: 96.8 F | BODY MASS INDEX: 40.7 KG/M2 | HEART RATE: 75 BPM | WEIGHT: 233.4 LBS

## 2020-07-08 PROCEDURE — 4040F PNEUMOC VAC/ADMIN/RCVD: CPT | Performed by: INTERNAL MEDICINE

## 2020-07-08 PROCEDURE — G8400 PT W/DXA NO RESULTS DOC: HCPCS | Performed by: INTERNAL MEDICINE

## 2020-07-08 PROCEDURE — G8417 CALC BMI ABV UP PARAM F/U: HCPCS | Performed by: INTERNAL MEDICINE

## 2020-07-08 PROCEDURE — 1123F ACP DISCUSS/DSCN MKR DOCD: CPT | Performed by: INTERNAL MEDICINE

## 2020-07-08 PROCEDURE — 1036F TOBACCO NON-USER: CPT | Performed by: INTERNAL MEDICINE

## 2020-07-08 PROCEDURE — 1090F PRES/ABSN URINE INCON ASSESS: CPT | Performed by: INTERNAL MEDICINE

## 2020-07-08 PROCEDURE — 99213 OFFICE O/P EST LOW 20 MIN: CPT | Performed by: INTERNAL MEDICINE

## 2020-07-08 PROCEDURE — G8427 DOCREV CUR MEDS BY ELIG CLIN: HCPCS | Performed by: INTERNAL MEDICINE

## 2020-07-21 ENCOUNTER — PROCEDURE VISIT (OUTPATIENT)
Dept: UROLOGY | Age: 75
End: 2020-07-21
Payer: MEDICARE

## 2020-07-21 ENCOUNTER — HOSPITAL ENCOUNTER (OUTPATIENT)
Age: 75
Setting detail: SPECIMEN
Discharge: HOME | End: 2020-07-21
Payer: MEDICARE

## 2020-07-21 VITALS — TEMPERATURE: 97.8 F

## 2020-07-21 PROBLEM — K13.0: Status: ACTIVE | Noted: 2020-07-21

## 2020-07-21 PROBLEM — E63.9: Status: ACTIVE | Noted: 2020-07-21

## 2020-07-21 PROCEDURE — 52000 CYSTOURETHROSCOPY: CPT | Performed by: UROLOGY

## 2020-07-21 ASSESSMENT — ENCOUNTER SYMPTOMS
COUGH: 0
SHORTNESS OF BREATH: 0
WHEEZING: 0
BACK PAIN: 0
ABDOMINAL PAIN: 0
SINUS PRESSURE: 0
NAUSEA: 0
ABDOMINAL DISTENTION: 0
SINUS PAIN: 0
DIARRHEA: 0
VOMITING: 0
CONSTIPATION: 0

## 2020-07-21 NOTE — PROGRESS NOTES
Cystoscopy Operative Note (7/21/20):  Surgeon: Xavi Mccann MD  Anesthesia: Urethral 2% Xylocaine  Indications: bladder cancer  Position: Dorsal Lithotomy    Findings:   Risks and Benefits discussed with patient prior to procedure. The patient was prepped and draped in the usual sterile fashion. The flexible cystoscope was advanced through the urethra and into the bladder. The bladder was thoroughly inspected and the following was noted:    Vagina: normal appearing vagina with normal color and discharge, no lesions  Residual Urine: mild  Urethra: not indicated and normal appearing urethra with no masses, tenderness or lesions  Bladder: No tumors or CIS noted. No bladder diverticulum. There was mild trabeculation noted. Ureters: Clear efflux from both ureters. Orifices with normal configuration and location. The cystoscope was removed. The patient tolerated the procedure well. Agree with the ROS entered by the MA.

## 2020-07-22 NOTE — PROGRESS NOTES
CYSTOSCOPY  11-17-15    TURB-bladder tumors removed    CYSTOSCOPY  2016    fulgeration with biopsies    CYSTOSCOPY N/A 2020    CYSTO BOTOX INJECTION 100UNITS performed by Leyda Jean MD at [address] [place] [address]. EYES.    HEMORRHOID SURGERY      HYSTERECTOMY, TOTAL ABDOMINAL      unsure if ovaries remain    OTHER SURGICAL HISTORY      abscess on tailbone    TONSILLECTOMY         Family History   Problem Relation Age of Onset   [facility] Cancer Mother         pancreatic age 80    Other Mother         Vascular disease    Cancer Father         bladder age 80    Heart Disease Maternal Grandmother     Heart Disease Maternal Grandfather     Heart Disease Paternal Grandfather     Cancer Paternal Aunt         ovarian       Social History     Tobacco Use    Smoking status: Former Smoker     Packs/day: 1.50     Years: 30.00     Pack years: 45.00     Types: Cigarettes     Last attempt to quit: 1992     Years since quittin.5    Smokeless tobacco: Never Used   Substance Use Topics    Alcohol use: Yes     Comment: COUPLE DRINKS PER WEEK. Current Outpatient Medications   Medication Sig Dispense Refill    losartan (COZAAR) 100 MG tablet Take 1 tablet by mouth daily 90 tablet 0    levothyroxine (SYNTHROID) 50 MCG tablet TAKE ONE TABLET BY MOUTH DAILY 90 tablet 1    furosemide (LASIX) 20 MG tablet TAKE ONE TABLET BY MOUTH DAILY AS NEEDED FOR PEDAL EDEMA 60 tablet 0    amLODIPine (NORVASC) 10 MG tablet Take 10 mg by mouth nightly       pantoprazole (PROTONIX) 40 MG tablet Take 40 mg by mouth daily       metoprolol tartrate (LOPRESSOR) 25 MG tablet Take 1 tablet by mouth 2 times daily 180 tablet 3    zolpidem (AMBIEN) 5 MG tablet Take by mouth nightly as needed.        clotrimazole-betamethasone (LOTRISONE) 1-0.05 % cream Apply externally to affected area twice daily prn 45 g 1    Ibuprofen-diphenhydrAMINE HCl (ADVIL PM) 200-25 MG CAPS Take by mouth nightly as needed constipation, diarrhea, nausea and vomiting. Endocrine: Negative for cold intolerance, heat intolerance, polydipsia, polyphagia and polyuria. Genitourinary: Negative for difficulty urinating, menstrual problem, vaginal bleeding and vaginal discharge. Musculoskeletal: Negative for back pain and joint swelling. Skin: Negative for rash. Neurological: Negative for numbness and headaches. Psychiatric/Behavioral: Negative for sleep disturbance. The patient is not nervous/anxious. All other systems reviewed and are negative. Objective:     Physical Exam  Vitals signs and nursing note reviewed. Constitutional:       General: She is not in acute distress. Appearance: She is well-developed. She is not diaphoretic. HENT:      Head: Normocephalic. No contusion. Right Ear: Hearing normal.      Left Ear: Hearing normal.      Mouth/Throat:      Lips: No lesions. Mouth: Oral lesions (Angular cheilitis at right side of her mouth) present. Pharynx: Uvula midline. Eyes:      General: No scleral icterus. Conjunctiva/sclera: Conjunctivae normal.      Pupils: Pupils are equal, round, and reactive to light. Neck:      Musculoskeletal: Full passive range of motion without pain, normal range of motion and neck supple. Thyroid: No thyroid mass or thyromegaly. Vascular: No JVD. Trachea: Phonation normal.   Cardiovascular:      Rate and Rhythm: Normal rate and regular rhythm. Pulses: No decreased pulses. Carotid pulses are 2+ on the right side and 2+ on the left side. Radial pulses are 2+ on the right side and 2+ on the left side. Heart sounds: Normal heart sounds. No murmur. Pulmonary:      Effort: Pulmonary effort is normal. No accessory muscle usage or respiratory distress. Breath sounds: Normal breath sounds. No wheezing or rales. Abdominal:      General: Bowel sounds are normal. There is no distension. Palpations: Abdomen is soft. Tenderness: There is no abdominal tenderness. Musculoskeletal: Normal range of motion. General: No deformity. Lymphadenopathy:      Cervical: No cervical adenopathy. Skin:     General: Skin is warm. Capillary Refill: Capillary refill takes less than 2 seconds. Findings: No rash. Neurological:      Mental Status: She is alert and oriented to person, place, and time. Deep Tendon Reflexes: Reflexes normal.   Psychiatric:         Behavior: Behavior normal.       /64   Pulse 75   Temp 96.8 °F (36 °C) (Temporal)   Resp 18   Wt 233 lb 6.4 oz (105.9 kg)   SpO2 98%   BMI 40.70 kg/m²     Assessment:          1. Angular cheilitis due to nutritional deficiency  Vitamin B complex            Diagnosis Orders   1. Angular cheilitis due to nutritional deficiency             Plan:      Return if symptoms worsen or fail to improve. No orders of the defined types were placed in this encounter. No orders of the defined types were placed in this encounter. Patient given educational materials - see patient instructions. Discussed use, benefit, and side effects of prescribedmedications. All patient questions answered. Pt voiced understanding. Reviewed health maintenance. Instructed to continue current medications, diet and exercise. Patient agreed with treatment plan. Follow up as directed. I spent a total of 15 minutes face to face with this patient. Over 50% of that time was spent on counseling and care coordination. Please see assessment and plan for details. Electronically signed by Alida Ott MD on 7/21/2020 at 10:30 PM      Please note that this chart was generated using voice recognition Dragon dictation software. Although every effort was made to ensure the accuracy of this automatedtranscription, some errors in transcription may have occurred.

## 2020-07-28 LAB — UROTHELIAL CANCER DETECTION: NORMAL

## 2020-09-23 ENCOUNTER — OFFICE VISIT (OUTPATIENT)
Dept: PRIMARY CARE CLINIC | Age: 75
End: 2020-09-23
Payer: MEDICARE

## 2020-09-23 VITALS
WEIGHT: 238.8 LBS | SYSTOLIC BLOOD PRESSURE: 128 MMHG | BODY MASS INDEX: 40.77 KG/M2 | OXYGEN SATURATION: 98 % | TEMPERATURE: 96.6 F | DIASTOLIC BLOOD PRESSURE: 68 MMHG | HEART RATE: 83 BPM | RESPIRATION RATE: 16 BRPM | HEIGHT: 64 IN

## 2020-09-23 PROCEDURE — 4040F PNEUMOC VAC/ADMIN/RCVD: CPT | Performed by: INTERNAL MEDICINE

## 2020-09-23 PROCEDURE — G0439 PPPS, SUBSEQ VISIT: HCPCS | Performed by: INTERNAL MEDICINE

## 2020-09-23 PROCEDURE — 1123F ACP DISCUSS/DSCN MKR DOCD: CPT | Performed by: INTERNAL MEDICINE

## 2020-09-23 ASSESSMENT — LIFESTYLE VARIABLES
HOW MANY STANDARD DRINKS CONTAINING ALCOHOL DO YOU HAVE ON A TYPICAL DAY: ONE OR TWO
AUDIT TOTAL SCORE: 3
HOW OFTEN DO YOU HAVE A DRINK CONTAINING ALCOHOL: TWO TO THREE TIMES A WEEK
HOW MANY STANDARD DRINKS CONTAINING ALCOHOL DO YOU HAVE ON A TYPICAL DAY: 0
HOW OFTEN DURING THE LAST YEAR HAVE YOU BEEN UNABLE TO REMEMBER WHAT HAPPENED THE NIGHT BEFORE BECAUSE YOU HAD BEEN DRINKING: NEVER
HAVE YOU OR SOMEONE ELSE BEEN INJURED AS A RESULT OF YOUR DRINKING: 0
HOW OFTEN DURING THE LAST YEAR HAVE YOU FAILED TO DO WHAT WAS NORMALLY EXPECTED FROM YOU BECAUSE OF DRINKING: 0
HAVE YOU OR SOMEONE ELSE BEEN INJURED AS A RESULT OF YOUR DRINKING: NO
AUDIT-C TOTAL SCORE: 0
HOW OFTEN DURING THE LAST YEAR HAVE YOU HAD A FEELING OF GUILT OR REMORSE AFTER DRINKING: NEVER
HOW OFTEN DO YOU HAVE SIX OR MORE DRINKS ON ONE OCCASION: NEVER
HOW OFTEN DURING THE LAST YEAR HAVE YOU HAD A FEELING OF GUILT OR REMORSE AFTER DRINKING: 0
HOW OFTEN DO YOU HAVE A DRINK CONTAINING ALCOHOL: 3
HOW OFTEN DURING THE LAST YEAR HAVE YOU FAILED TO DO WHAT WAS NORMALLY EXPECTED FROM YOU BECAUSE OF DRINKING: NEVER
HOW OFTEN DURING THE LAST YEAR HAVE YOU BEEN UNABLE TO REMEMBER WHAT HAPPENED THE NIGHT BEFORE BECAUSE YOU HAD BEEN DRINKING: 0
HAS A RELATIVE, FRIEND, DOCTOR, OR ANOTHER HEALTH PROFESSIONAL EXPRESSED CONCERN ABOUT YOUR DRINKING OR SUGGESTED YOU CUT DOWN: 0
HOW OFTEN DURING THE LAST YEAR HAVE YOU FOUND THAT YOU WERE NOT ABLE TO STOP DRINKING ONCE YOU HAD STARTED: NEVER
AUDIT TOTAL SCORE: 0
HOW OFTEN DURING THE LAST YEAR HAVE YOU NEEDED AN ALCOHOLIC DRINK FIRST THING IN THE MORNING TO GET YOURSELF GOING AFTER A NIGHT OF HEAVY DRINKING: NEVER
HOW OFTEN DURING THE LAST YEAR HAVE YOU NEEDED AN ALCOHOLIC DRINK FIRST THING IN THE MORNING TO GET YOURSELF GOING AFTER A NIGHT OF HEAVY DRINKING: 0
HAS A RELATIVE, FRIEND, DOCTOR, OR ANOTHER HEALTH PROFESSIONAL EXPRESSED CONCERN ABOUT YOUR DRINKING OR SUGGESTED YOU CUT DOWN: NO
AUDIT-C TOTAL SCORE: 3
HOW OFTEN DURING THE LAST YEAR HAVE YOU FOUND THAT YOU WERE NOT ABLE TO STOP DRINKING ONCE YOU HAD STARTED: 0
HOW OFTEN DO YOU HAVE SIX OR MORE DRINKS ON ONE OCCASION: 0

## 2020-09-23 ASSESSMENT — PATIENT HEALTH QUESTIONNAIRE - PHQ9
1. LITTLE INTEREST OR PLEASURE IN DOING THINGS: 1
SUM OF ALL RESPONSES TO PHQ9 QUESTIONS 1 & 2: 1
SUM OF ALL RESPONSES TO PHQ QUESTIONS 1-9: 1
2. FEELING DOWN, DEPRESSED OR HOPELESS: 0
SUM OF ALL RESPONSES TO PHQ QUESTIONS 1-9: 1

## 2020-09-23 NOTE — PATIENT INSTRUCTIONS
Personalized Preventive Plan for Lynnette Morel - 9/23/2020  Medicare offers a range of preventive health benefits. Some of the tests and screenings are paid in full while other may be subject to a deductible, co-insurance, and/or copay. Some of these benefits include a comprehensive review of your medical history including lifestyle, illnesses that may run in your family, and various assessments and screenings as appropriate. After reviewing your medical record and screening and assessments performed today your provider may have ordered immunizations, labs, imaging, and/or referrals for you. A list of these orders (if applicable) as well as your Preventive Care list are included within your After Visit Summary for your review. Other Preventive Recommendations:    · A preventive eye exam performed by an eye specialist is recommended every 1-2 years to screen for glaucoma; cataracts, macular degeneration, and other eye disorders. · A preventive dental visit is recommended every 6 months. · Try to get at least 150 minutes of exercise per week or 10,000 steps per day on a pedometer . · Order or download the FREE \"Exercise & Physical Activity: Your Everyday Guide\" from [employer]. Call 5-201.702.3103 or search [employer] online. · You need 0456-2774 mg of calcium and 6647-2360 IU of vitamin D per day. It is possible to meet your calcium requirement with diet alone, but a vitamin D supplement is usually necessary to meet this goal.  · When exposed to the sun, use a sunscreen that protects against both UVA and UVB radiation with an SPF of 30 or greater. Reapply every 2 to 3 hours or after sweating, drying off with a towel, or swimming. · Always wear a seat belt when traveling in a car. Always wear a helmet when riding a bicycle or motorcycle.

## 2020-09-23 NOTE — PROGRESS NOTES
Medicare Annual Wellness Visit  Name: Ana Mora Date: 2020   MRN: [id number] Sex: Female   Age: 66 y.o. Ethnicity: Non-/Non    : 1945 Race: Madie Garcia is here for Medicare AWV    Screenings for behavioral, psychosocial and functional/safety risks, and cognitive dysfunction are all negative except as indicated below. These results, as well as other patient data from the [address] form, are documented in Flowsheets linked to this Encounter. Allergies   Allergen Reactions    Atorvastatin Other (See Comments)     diarrhea    Other Other (See Comments)     Passed out and because of multiple issues with this combo drug, Penstrept was taken off market in 1960s, It contained Penicillin and Streptomycin in one pill. Patient states she has taken penicillin and/or streptomycin individually without problems.  Tobramycin Swelling    Molds & Smuts Other (See Comments)     coughing       Prior to Visit Medications    Medication Sig Taking? Authorizing Provider   losartan (COZAAR) 100 MG tablet Take 1 tablet by mouth daily Yes Jos Richard MD   levothyroxine (SYNTHROID) 50 MCG tablet TAKE ONE TABLET BY MOUTH DAILY Yes Jos Richard MD   furosemide (LASIX) 20 MG tablet TAKE ONE TABLET BY MOUTH DAILY AS NEEDED FOR PEDAL EDEMA Yes Jos Richard MD   pantoprazole (PROTONIX) 40 MG tablet Take 40 mg by mouth daily  Yes Historical Provider, MD   metoprolol tartrate (LOPRESSOR) 25 MG tablet Take 1 tablet by mouth 2 times daily Yes Jos Richard MD   zolpidem (AMBIEN) 5 MG tablet Take by mouth nightly as needed.   Yes Historical Provider, MD   clotrimazole-betamethasone (LOTRISONE) 1-0.05 % cream Apply externally to affected area twice daily prn Yes Alida Mercado MD   Ibuprofen-diphenhydrAMINE HCl (ADVIL PM) 200-25 MG CAPS Take by mouth nightly as needed Yes Historical Provider, MD   aspirin 81 MG EC tablet Take 81 mg by mouth daily Yes Historical Provider, MD   nitroGLYCERIN (NITROSTAT) 0.4 MG SL tablet Place 0.4 mg under the tongue every 5 minutes as needed for Chest pain. Yes Historical Provider, MD   Multiple Vitamins-Minerals (THERAPEUTIC MULTIVITAMIN-MINERALS) tablet Take 1 tablet by mouth daily. Yes Historical Provider, MD   amLODIPine (NORVASC) 10 MG tablet Take 10 mg by mouth as needed   Historical Provider, MD   solifenacin (VESICARE) 5 MG tablet Take 5 mg by mouth daily. Historical Provider, MD       Past Medical History:   Diagnosis Date    AK (actinic keratosis)     from sun exposure    Anesthesia     HARD TIME WAKING UP \"SOMETIMES\".  Arthritis     Asymptomatic bilateral carotid artery stenosis 3/10/2015    Bladder cancer (HCC)     bladder, first time 1993    CAD (coronary artery disease)     Colon polyp     Dr Cisneros Mention    Diarrhea     Difficult intubation     SMALL MOUTH AND AIRWAY    GERD (gastroesophageal reflux disease)     History of atrial fibrillation     not chronic    Hypertension     Hypothyroid     Obesity     DAISY (obstructive sleep apnea)     NO MACHINE, she has bad sinus issues and requested alternatives instead of a machine, nothing was done    PAC (premature atrial contraction)     occasionally    Stress incontinence     Wears glasses     FOR READING. Past Surgical History:   Procedure Laterality Date    BLADDER SURGERY  8/1993 and 10/2002    for bladder cancer treatment    BREAST BIOPSY      LT. BREAST (BENIGN). , large mass removed    CHOLECYSTECTOMY      COLONOSCOPY      3-4X    CORONARY ANGIOPLASTY  07/03/2014    3 stents inserted via heart cath    CYST REMOVAL Right     3RD FINGER.    CYSTOSCOPY  2014    Dr Prasanna Dowling  [birth year]    TURB-bladder tumors removed    CYSTOSCOPY  11/18/2016    fulgeration with biopsies    CYSTOSCOPY N/A 1/21/2020    CYSTO BOTOX INJECTION 100UNITS performed by Dann Moulton MD at [address] [place] [address]  EYES.    HEMORRHOID SURGERY      HYSTERECTOMY, TOTAL ABDOMINAL  1999    unsure if ovaries remain    OTHER SURGICAL HISTORY      abscess on tailbone    TONSILLECTOMY         Family History   Problem Relation Age of Onset   [facility] Mother         pancreatic age 80    Other Mother         Vascular disease    Cancer Father         bladder age 80    Heart Disease Maternal Grandmother     Heart Disease Maternal Grandfather     Heart Disease Paternal Grandfather     Cancer Paternal Aunt         ovarian       CareTeam (Including outside providers/suppliers regularly involved in providing care):   Patient Care Team:  Angel Luis High MD as PCP - General (Internal Medicine)  Angel Luis High [facility]  Eric Cowan MD as Consulting Physician (Ophthalmology)  Barby Alcala MD as Consulting Physician  Alyson Alfonso DO as Consulting Physician (Cardiology)  Bassam Carreon as Consulting Physician (Gastroenterology)  Chandni Espinoza MD as Surgeon (General Surgery)  Johana Craven MD as Surgeon (Vascular Surgery)  Leyda Jean MD as Consulting Physician (Urology)    Wt Readings from Last 3 Encounters:   09/23/20 238 lb 12.8 oz (108.3 kg)   07/08/20 233 lb 6.4 oz (105.9 kg)   06/18/20 236 lb (107 kg)     Vitals:    09/23/20 0915   BP: 128/68   Pulse: 83   Resp: 16   Temp: 96.6 °F (35.9 °C)   TempSrc: Temporal   SpO2: 98%   Weight: 238 lb 12.8 oz (108.3 kg)   Height: 5' 3.5\" (1.613 m)     Body mass index is 41.64 kg/m². Based upon direct observation of the patient, evaluation of cognition reveals recent and remote memory intact.     General Appearance: alert and oriented to person, place and time, well developed and well- nourished, in no acute distress  Skin: warm and dry, no rash or erythema  Head: normocephalic and atraumatic  Eyes: pupils equal, round, and reactive to light, extraocular eye movements intact, conjunctivae normal  ENT: tympanic membrane, external ear and ear canal normal bilaterally, nose without deformity, nasal mucosa and turbinates normal without polyps  Neck: supple and non-tender without mass, no thyromegaly or thyroid nodules, no cervical lymphadenopathy  Pulmonary/Chest: clear to auscultation bilaterally- no wheezes, rales or rhonchi, normal air movement, no respiratory distress  Cardiovascular: normal rate, regular rhythm, normal S1 and S2, no murmurs, rubs, clicks, or gallops, distal pulses intact, no carotid bruits  Abdomen: soft, non-tender, non-distended, normal bowel sounds, no masses or organomegaly  Extremities: no cyanosis, clubbing or edema  Musculoskeletal: normal range of motion, no joint swelling, deformity or tenderness  Neurologic: reflexes normal and symmetric, no cranial nerve deficit, gait, coordination and speech normal    Patient's complete Health Risk Assessment and screening values have been reviewed and are found in Flowsheets. The following problems were reviewed today and where indicated follow up appointments were made and/or referrals ordered. Positive Risk Factor Screenings with Interventions:     Health Habits/Nutrition:  Health Habits/Nutrition  Do you exercise for at least 20 minutes 2-3 times per week?: (!) No  Have you lost any weight without trying in the past 3 months?: No  Do you eat fewer than 2 meals per day?: No  Have you seen a dentist within the past year?: Yes  Body mass index is 41.64 kg/m².   Health Habits/Nutrition Interventions:  · Inadequate physical activity:  patient agrees to exercise for at least 150 minutes/week    Safety:  Safety  Do you have working smoke detectors?: Yes  Have all throw rugs been removed or fastened?: (!) No  Do you have non-slip mats or surfaces in all bathtubs/showers?: Yes  Do all of your stairways have a railing or banister?: Yes  Are your doorways, halls and stairs free of clutter?: Yes  Do you always fasten your seatbelt when you are in a car?: Yes  Safety Interventions:  · Home safety tips

## 2020-10-07 ENCOUNTER — TELEPHONE (OUTPATIENT)
Dept: UROLOGY | Age: 75
End: 2020-10-07

## 2020-10-07 NOTE — TELEPHONE ENCOUNTER
Cysto, Botox 100 units @ [facility] 11/17/20 1:00pm **STOP BLOOD THINNERS 11/11/20**   PAT @ [facility] 11/6/20 11:00am   [facility] [id number] 11/13/20 11:00am               Spoke with patient procedure info emailed

## 2020-11-03 PROBLEM — I25.10 CAD (CORONARY ARTERY DISEASE): Status: RESOLVED | Noted: 2020-11-03 | Resolved: 2020-11-03

## 2020-11-06 ENCOUNTER — HOSPITAL ENCOUNTER (OUTPATIENT)
Dept: PREADMISSION TESTING | Age: 75
Discharge: HOME | End: 2020-11-10
Payer: MEDICARE

## 2020-11-06 VITALS
DIASTOLIC BLOOD PRESSURE: 88 MMHG | SYSTOLIC BLOOD PRESSURE: 152 MMHG | WEIGHT: 238 LBS | BODY MASS INDEX: 42.17 KG/M2 | HEART RATE: 75 BPM | RESPIRATION RATE: 16 BRPM | OXYGEN SATURATION: 97 % | TEMPERATURE: 97.2 F | HEIGHT: 63 IN

## 2020-11-06 LAB
ABSOLUTE EOS #: 0.2 K/UL (ref 0–0.4)
ABSOLUTE IMMATURE GRANULOCYTE: ABNORMAL K/UL (ref 0–0.3)
ABSOLUTE LYMPH #: 2.1 K/UL (ref 1–4.8)
ABSOLUTE MONO #: 0.9 K/UL (ref 0.1–1.3)
ANION GAP SERPL CALCULATED.3IONS-SCNC: 8 MMOL/L (ref 9–17)
BASOPHILS # BLD: 1 % (ref 0–2)
BASOPHILS ABSOLUTE: 0 K/UL (ref 0–0.2)
BUN BLDV-MCNC: 16 MG/DL (ref 8–23)
BUN/CREAT BLD: ABNORMAL (ref 9–20)
CALCIUM SERPL-MCNC: 9.6 MG/DL (ref 8.6–10.4)
CHLORIDE BLD-SCNC: 105 MMOL/L (ref 98–107)
CO2: 27 MMOL/L (ref 20–31)
CREAT SERPL-MCNC: 0.67 MG/DL (ref 0.5–0.9)
DIFFERENTIAL TYPE: ABNORMAL
EOSINOPHILS RELATIVE PERCENT: 3 % (ref 0–4)
GFR AFRICAN AMERICAN: >60 ML/MIN
GFR NON-AFRICAN AMERICAN: >60 ML/MIN
GFR SERPL CREATININE-BSD FRML MDRD: ABNORMAL ML/MIN/{1.73_M2}
GFR SERPL CREATININE-BSD FRML MDRD: ABNORMAL ML/MIN/{1.73_M2}
GLUCOSE BLD-MCNC: 75 MG/DL (ref 70–99)
HCT VFR BLD CALC: 41.4 % (ref 36–46)
HEMOGLOBIN: 13.6 G/DL (ref 12–16)
IMMATURE GRANULOCYTES: ABNORMAL %
LYMPHOCYTES # BLD: 26 % (ref 24–44)
MCH RBC QN AUTO: 29.9 PG (ref 26–34)
MCHC RBC AUTO-ENTMCNC: 32.8 G/DL (ref 31–37)
MCV RBC AUTO: 91 FL (ref 80–100)
MONOCYTES # BLD: 12 % (ref 1–7)
NRBC AUTOMATED: ABNORMAL PER 100 WBC
PDW BLD-RTO: 13.1 % (ref 11.5–14.9)
PLATELET # BLD: 251 K/UL (ref 150–450)
PLATELET ESTIMATE: ABNORMAL
PMV BLD AUTO: 7.8 FL (ref 6–12)
POTASSIUM SERPL-SCNC: 5.3 MMOL/L (ref 3.7–5.3)
RBC # BLD: 4.55 M/UL (ref 4–5.2)
RBC # BLD: ABNORMAL 10*6/UL
SEG NEUTROPHILS: 58 % (ref 36–66)
SEGMENTED NEUTROPHILS ABSOLUTE COUNT: 4.5 K/UL (ref 1.3–9.1)
SODIUM BLD-SCNC: 140 MMOL/L (ref 135–144)
WBC # BLD: 7.8 K/UL (ref 3.5–11)
WBC # BLD: ABNORMAL 10*3/UL

## 2020-11-06 PROCEDURE — 87086 URINE CULTURE/COLONY COUNT: CPT

## 2020-11-06 PROCEDURE — 80048 BASIC METABOLIC PNL TOTAL CA: CPT

## 2020-11-06 PROCEDURE — 93005 ELECTROCARDIOGRAM TRACING: CPT | Performed by: ANESTHESIOLOGY

## 2020-11-06 PROCEDURE — 87088 URINE BACTERIA CULTURE: CPT

## 2020-11-06 PROCEDURE — 36415 COLL VENOUS BLD VENIPUNCTURE: CPT

## 2020-11-06 PROCEDURE — 85025 COMPLETE CBC W/AUTO DIFF WBC: CPT

## 2020-11-06 PROCEDURE — 87186 SC STD MICRODIL/AGAR DIL: CPT

## 2020-11-06 NOTE — H&P
HISTORY and Treraul Fournier [id number]       NAME:  Kate Londono  MRN: [id number]   YOB: 1945   Date: 11/6/2020   Age: 66 y.o. Gender: female       COMPLAINT AND PRESENT HISTORY:     Kate Londono is 66 y.o. , female, Preadmission Testing for OAB (overactive bladder) and Malignant neoplasm of urinary bladder. Patient is s/p baldder surgery. Patient has a hx of incontinence and been on Myrbetriq since 6 weeks, she complains of expensive cost and minimal improvement. She goes through several pads per day. She goes about every 30 minutes during  the day. She has some episodes of leaking when she stands up as well. The meds  helped a bit,      but not enough to justify the meds. Patient will be having CYSTOSCOPY INJECTION BOTOX 100 UNITS . Patient has had Botox treatment for the first at the beginning of the year. before and has noticed less leaking, less frequency and less nocturia. Patient has hx of  HTN,  DAISY, CAD she has x3 stents in place. Patient states that she is on baby aspirin and beta blocker. Pt reports that she does use a C-PAP at night. Patient denies any chest pain, palpitations or SOB. Patient denies any dysuria or hematuria. No fever or chills. PAST MEDICAL HISTORY     Past Medical History:   Diagnosis Date    AK (actinic keratosis)     from sun exposure    Anesthesia     HARD TIME WAKING UP \"SOMETIMES\".     Arthritis     Asymptomatic bilateral carotid artery stenosis 3/10/2015    Bladder cancer (HCC)     bladder, first time 1993    CAD (coronary artery disease)     Colon polyp     Dr Rachael Jackson    Diarrhea     Difficult intubation     SMALL MOUTH AND AIRWAY    GERD (gastroesophageal reflux disease)     History of atrial fibrillation     not chronic    Hypertension     Hypothyroid     Obesity     DAISY (obstructive sleep apnea)     NO MACHINE, she has bad sinus issues and requested alternatives instead of a machine, nothing was done   [facility] (premature atrial contraction)     occasionally    Prolonged emergence from general anesthesia     Stress incontinence     Wears glasses     FOR READING. SURGICAL HISTORY       Past Surgical History:   Procedure Laterality Date    BACK SURGERY  2019    stem cell injection    BLADDER SURGERY  8/1993 and 10/2002    for bladder cancer treatment    BREAST BIOPSY      LT. BREAST (BENIGN). , large mass removed    CHOLECYSTECTOMY      COLONOSCOPY      3-4X    CORONARY ANGIOPLASTY  07/03/2014    3 stents inserted via heart cath    CYST REMOVAL Right     3RD FINGER.    CYSTOSCOPY  2014    Dr Alis Ramirez  34-22-50    TURB-bladder tumors removed    CYSTOSCOPY  11/18/2016    fulgeration with biopsies    CYSTOSCOPY N/A 1/21/2020    CYSTO BOTOX INJECTION 100UNITS performed by Geetha Parsons MD at [address] [place] [address] MACIE[place].  EYES.    HEMORRHOID SURGERY      HYSTERECTOMY, TOTAL ABDOMINAL  1999    unsure if ovaries remain    KNEE SURGERY Bilateral 07/01/2020    stem cell injections    OTHER SURGICAL HISTORY      abscess on tailbone    TONSILLECTOMY         FAMILY HISTORY       Family History   Problem Relation Age of Onset   Mikayla Oscar Cancer Mother         pancreatic age 80    Other Mother         Vascular disease    Cancer Father         bladder age 80    Heart Disease Maternal Grandmother     Heart Disease Maternal Grandfather     Heart Disease Paternal Grandfather     Cancer Paternal Aunt         ovarian       SOCIAL HISTORY       Social History     Socioeconomic History    Marital status:      Spouse name: None    Number of children: None    Years of education: None    Highest education level: None   Occupational History    None   Social Needs    Financial resource strain: None    Food insecurity     Worry: None     Inability: None    Transportation needs     Medical: None     Non-medical: None   Tobacco Use    Smoking status: Former Smoker Packs/day: 1.50     Years: 30.00     Pack years: 45.00     Types: Cigarettes     Last attempt to quit: 1992     Years since quittin.8    Smokeless tobacco: Never Used   Substance and Sexual Activity    Alcohol use: Yes     Comment: COUPLE DRINKS PER WEEK.  Drug use: No    Sexual activity: None   Lifestyle    Physical activity     Days per week: None     Minutes per session: None    Stress: None   Relationships    Social connections     Talks on phone: None     Gets together: None     Attends Spiritism service: None     Active member of club or organization: None     Attends meetings of clubs or organizations: None     Relationship status: None    Intimate partner violence     Fear of current or ex partner: None     Emotionally abused: None     Physically abused: None     Forced sexual activity: None   Other Topics Concern    None   Social History Narrative    None        REVIEW OF SYSTEMS      Allergies   Allergen Reactions    Atorvastatin Other (See Comments)     diarrhea    Other Other (See Comments)     Passed out and because of multiple issues with this combo drug, Penstrept was taken off market in 1960s, It contained Penicillin and Streptomycin in one pill. Patient states she has taken penicillin and/or streptomycin individually without problems.     Tobramycin Swelling    Molds & Smuts Other (See Comments)     coughing       Current Outpatient Medications on File Prior to Encounter   Medication Sig Dispense Refill    losartan (COZAAR) 100 MG tablet Take 1 tablet by mouth daily 90 tablet 0    levothyroxine (SYNTHROID) 50 MCG tablet TAKE ONE TABLET BY MOUTH DAILY 90 tablet 1    furosemide (LASIX) 20 MG tablet TAKE ONE TABLET BY MOUTH DAILY AS NEEDED FOR PEDAL EDEMA 60 tablet 0    pantoprazole (PROTONIX) 40 MG tablet Take 40 mg by mouth daily       metoprolol tartrate (LOPRESSOR) 25 MG tablet Take 1 tablet by mouth 2 times daily 180 tablet 3    zolpidem (AMBIEN) 5 MG tablet Take by mouth nightly as needed.  clotrimazole-betamethasone (LOTRISONE) 1-0.05 % cream Apply externally to affected area twice daily prn 45 g 1    aspirin 81 MG EC tablet Take 81 mg by mouth daily      nitroGLYCERIN (NITROSTAT) 0.4 MG SL tablet Place 0.4 mg under the tongue every 5 minutes as needed for Chest pain.  Multiple Vitamins-Minerals (THERAPEUTIC MULTIVITAMIN-MINERALS) tablet Take 1 tablet by mouth daily.  [DISCONTINUED] solifenacin (VESICARE) 5 MG tablet Take 5 mg by mouth daily. No current facility-administered medications on file prior to encounter. General health:  Fairly good. No fever or chills. Skin:  No itching, redness or rash. HEENT:  No headache, epistaxis or sore throat. Neck:  No pain, stiffness or masses. Cardiovascular/Respiratory system:  No chest pain, palpitation or shortness of breath. Gastrointestinal tract: No abdominal pain, Dysphagia, nausea, vomiting, diarrhea or constipation. Genitourinary:  See HPI. Locomotor:  No bone or joint pains. No swelling. Neuropsychiatric:  No referable complaints. GENERAL PHYSICAL EXAM:     Vitals: BP (!) 152/88   Pulse 75   Temp 97.2 °F (36.2 °C) (Infrared)   Resp 16   Ht 5' 3\" (1.6 m)   Wt 238 lb (108 kg)   SpO2 97%   BMI 42.16 kg/m²  Body mass index is 42.16 kg/m². GENERAL APPEARANCE:   Georgiann Sandifer is 66 y.o.,  female, moderately obese, nourished, conscious, alert. Does not appear to be distress or pain at this time. SKIN:  Warm, dry, no cyanosis or jaundice. HEAD:  Normocephalic, atraumatic, no swelling or tenderness. EYES:  Pupils equal, reactive to light. EARS:  No discharge, no marked hearing loss. NOSE:  No rhinorrhea, epistaxis or septal deformity.                  THROAT: Not congested. No ulceration bleeding or discharge. NECK:  No stiffness, trachea central.                  CHEST:  Symmetrical and equal on expansion. HEART:  RRR S1 > S2. No audible murmurs or gallops. LUNGS:  Equal on expansion, normal breath sounds. No adventitious sounds. ABDOMEN:  Obese. Soft on palpation. No localized tenderness. No guarding or rigidity. No palpable hepatosplenomegaly. LYMPHATICS:  No palpable cervical lymphadenopathy. LOCOMOTOR, BACK AND SPINE:  No tenderness or deformities. EXTREMITIES:  Symmetrical,  Mild  pretibial edema. Bettyes sign negative or no calf tenderness. No discoloration or ulcerations. NEUROLOGIC:  The patient is conscious, alert, oriented,Cranial nerve II-XII intact, taste and smell were not examined. No apparent focal sensory or motor deficits.                                                                                      PROVISIONAL DIAGNOSES / SURGERY:       OAB (overactive bladder)      Malignant neoplasm of urinary bladder    CYSTOSCOPY INJECTION BOTOX 100 UNITS     Patient Active Problem List    Diagnosis Date Noted    Angular cheilitis due to nutritional deficiency 07/21/2020    Primary osteoarthritis of right knee 05/17/2020    Actinic keratosis 02/12/2020    Polyp of colon 02/12/2020    Gastroesophageal reflux disease 02/12/2020    Helton's esophagus without dysplasia 10/22/2019    Left ventricular hypertrophy 02/21/2019    Dyslipidemia 05/07/2018    Atherosclerosis of native coronary artery of native heart without angina pectoris 05/23/2017    Breast cyst 06/23/2015    Cyst of breast 06/23/2015    Carotid stenosis 04/14/2015    Asymptomatic bilateral carotid artery stenosis 03/10/2015    Carotid artery stenosis 03/10/2015    Bladder cancer ([facility] [address] 08/25/2014    Malignant tumor of urinary bladder ([facility] [address] 08/25/2014    Neurogenic bladder 08/05/2014  DDD (degenerative disc disease), lumbar 03/04/2014    DAISY (obstructive sleep apnea)     AK (actinic keratosis)     Hypothyroid     Irritable bowel syndrome     Insomnia     Colon polyp     Obesity     Hypertension            DARA BRADFORD, APRN - CNP on 11/6/2020 at 11:58 AM

## 2020-11-07 LAB
CULTURE: ABNORMAL
Lab: ABNORMAL
SPECIMEN DESCRIPTION: ABNORMAL

## 2020-11-08 LAB
EKG ATRIAL RATE: 67 BPM
EKG P AXIS: 46 DEGREES
EKG P-R INTERVAL: 170 MS
EKG Q-T INTERVAL: 408 MS
EKG QRS DURATION: 70 MS
EKG QTC CALCULATION (BAZETT): 431 MS
EKG R AXIS: 26 DEGREES
EKG T AXIS: 47 DEGREES
EKG VENTRICULAR RATE: 67 BPM

## 2020-11-08 PROCEDURE — 93010 ELECTROCARDIOGRAM REPORT: CPT | Performed by: INTERNAL MEDICINE

## 2020-11-09 RX ORDER — CEPHALEXIN 500 MG/1
500 CAPSULE ORAL 2 TIMES DAILY
Qty: 14 CAPSULE | Refills: 0 | Status: SHIPPED | OUTPATIENT
Start: 2020-11-09 | End: 2020-11-16

## 2020-11-09 RX ORDER — LEVOTHYROXINE SODIUM 0.05 MG/1
TABLET ORAL
Qty: 90 TABLET | Refills: 0 | Status: SHIPPED | OUTPATIENT
Start: 2020-11-09 | End: 2020-12-16 | Stop reason: SDUPTHER

## 2020-11-09 NOTE — TELEPHONE ENCOUNTER
Bladder cancer ([facility] [address]     Asymptomatic bilateral carotid artery stenosis     Carotid stenosis     Breast cyst     Dyslipidemia     Left ventricular hypertrophy     Actinic keratosis     Carotid artery stenosis     Malignant tumor of urinary bladder (HCC)     Cyst of breast     Atherosclerosis of native coronary artery of native heart without angina pectoris     Polyp of colon     Helton's esophagus without dysplasia     Gastroesophageal reflux disease     Primary osteoarthritis of right knee     Angular cheilitis due to nutritional deficiency

## 2020-11-13 ENCOUNTER — HOSPITAL ENCOUNTER (OUTPATIENT)
Dept: PREADMISSION TESTING | Age: 75
Setting detail: SPECIMEN
Discharge: HOME | End: 2020-11-17
Payer: MEDICARE

## 2020-11-13 PROCEDURE — U0003 INFECTIOUS AGENT DETECTION BY NUCLEIC ACID (DNA OR RNA); SEVERE ACUTE RESPIRATORY SYNDROME CORONAVIRUS 2 (SARS-COV-2) (CORONAVIRUS DISEASE [COVID-19]), AMPLIFIED PROBE TECHNIQUE, MAKING USE OF HIGH THROUGHPUT TECHNOLOGIES AS DESCRIBED BY CMS-2020-01-R: HCPCS

## 2020-11-15 LAB — SARS-COV-2, NAA: NOT DETECTED

## 2020-11-16 ENCOUNTER — ANESTHESIA EVENT (OUTPATIENT)
Dept: OPERATING ROOM | Age: 75
End: 2020-11-16
Payer: MEDICARE

## 2020-11-17 ENCOUNTER — HOSPITAL ENCOUNTER (OUTPATIENT)
Age: 75
Setting detail: OUTPATIENT SURGERY
Discharge: HOME | End: 2020-11-17
Attending: UROLOGY | Admitting: UROLOGY
Payer: MEDICARE

## 2020-11-17 ENCOUNTER — ANESTHESIA (OUTPATIENT)
Dept: OPERATING ROOM | Age: 75
End: 2020-11-17
Payer: MEDICARE

## 2020-11-17 VITALS
HEART RATE: 77 BPM | BODY MASS INDEX: 42.17 KG/M2 | RESPIRATION RATE: 16 BRPM | OXYGEN SATURATION: 99 % | TEMPERATURE: 96.5 F | SYSTOLIC BLOOD PRESSURE: 172 MMHG | DIASTOLIC BLOOD PRESSURE: 71 MMHG | HEIGHT: 63 IN | WEIGHT: 238 LBS

## 2020-11-17 VITALS — DIASTOLIC BLOOD PRESSURE: 53 MMHG | SYSTOLIC BLOOD PRESSURE: 121 MMHG | OXYGEN SATURATION: 99 %

## 2020-11-17 PROCEDURE — 3600000002 HC SURGERY LEVEL 2 BASE: Performed by: UROLOGY

## 2020-11-17 PROCEDURE — 6370000000 HC RX 637 (ALT 250 FOR IP): Performed by: UROLOGY

## 2020-11-17 PROCEDURE — 3700000001 HC ADD 15 MINUTES (ANESTHESIA): Performed by: UROLOGY

## 2020-11-17 PROCEDURE — 7100000001 HC PACU RECOVERY - ADDTL 15 MIN: Performed by: UROLOGY

## 2020-11-17 PROCEDURE — 2500000003 HC RX 250 WO HCPCS: Performed by: NURSE ANESTHETIST, CERTIFIED REGISTERED

## 2020-11-17 PROCEDURE — 7100000030 HC ASPR PHASE II RECOVERY - FIRST 15 MIN: Performed by: UROLOGY

## 2020-11-17 PROCEDURE — 3600000012 HC SURGERY LEVEL 2 ADDTL 15MIN: Performed by: UROLOGY

## 2020-11-17 PROCEDURE — 6360000002 HC RX W HCPCS: Performed by: NURSE ANESTHETIST, CERTIFIED REGISTERED

## 2020-11-17 PROCEDURE — 7100000010 HC PHASE II RECOVERY - FIRST 15 MIN: Performed by: UROLOGY

## 2020-11-17 PROCEDURE — 7100000000 HC PACU RECOVERY - FIRST 15 MIN: Performed by: UROLOGY

## 2020-11-17 PROCEDURE — 7100000011 HC PHASE II RECOVERY - ADDTL 15 MIN: Performed by: UROLOGY

## 2020-11-17 PROCEDURE — 3700000000 HC ANESTHESIA ATTENDED CARE: Performed by: UROLOGY

## 2020-11-17 PROCEDURE — 2580000003 HC RX 258: Performed by: ANESTHESIOLOGY

## 2020-11-17 PROCEDURE — 2709999900 HC NON-CHARGEABLE SUPPLY: Performed by: UROLOGY

## 2020-11-17 PROCEDURE — 6360000002 HC RX W HCPCS: Performed by: UROLOGY

## 2020-11-17 PROCEDURE — 88120 CYTP URNE 3-5 PROBES EA SPEC: CPT

## 2020-11-17 PROCEDURE — 7100000031 HC ASPR PHASE II RECOVERY - ADDTL 15 MIN: Performed by: UROLOGY

## 2020-11-17 RX ORDER — HYDRALAZINE HYDROCHLORIDE 20 MG/ML
5 INJECTION INTRAMUSCULAR; INTRAVENOUS EVERY 10 MIN PRN
Status: DISCONTINUED | OUTPATIENT
Start: 2020-11-17 | End: 2020-11-17 | Stop reason: HOSPADM

## 2020-11-17 RX ORDER — METOCLOPRAMIDE HYDROCHLORIDE 5 MG/ML
10 INJECTION INTRAMUSCULAR; INTRAVENOUS
Status: DISCONTINUED | OUTPATIENT
Start: 2020-11-17 | End: 2020-11-17 | Stop reason: HOSPADM

## 2020-11-17 RX ORDER — DIPHENHYDRAMINE HYDROCHLORIDE 50 MG/ML
12.5 INJECTION INTRAMUSCULAR; INTRAVENOUS
Status: DISCONTINUED | OUTPATIENT
Start: 2020-11-17 | End: 2020-11-17 | Stop reason: HOSPADM

## 2020-11-17 RX ORDER — MIDAZOLAM HYDROCHLORIDE 1 MG/ML
INJECTION INTRAMUSCULAR; INTRAVENOUS PRN
Status: DISCONTINUED | OUTPATIENT
Start: 2020-11-17 | End: 2020-11-17 | Stop reason: SDUPTHER

## 2020-11-17 RX ORDER — FENTANYL CITRATE 50 UG/ML
25 INJECTION, SOLUTION INTRAMUSCULAR; INTRAVENOUS EVERY 5 MIN PRN
Status: DISCONTINUED | OUTPATIENT
Start: 2020-11-17 | End: 2020-11-17 | Stop reason: HOSPADM

## 2020-11-17 RX ORDER — PROMETHAZINE HYDROCHLORIDE 25 MG/ML
6.25 INJECTION, SOLUTION INTRAMUSCULAR; INTRAVENOUS
Status: DISCONTINUED | OUTPATIENT
Start: 2020-11-17 | End: 2020-11-17 | Stop reason: HOSPADM

## 2020-11-17 RX ORDER — NITROFURANTOIN 25; 75 MG/1; MG/1
100 CAPSULE ORAL 2 TIMES DAILY
Qty: 14 CAPSULE | Refills: 0 | Status: SHIPPED | OUTPATIENT
Start: 2020-11-17 | End: 2020-11-24

## 2020-11-17 RX ORDER — LIDOCAINE HYDROCHLORIDE 10 MG/ML
1 INJECTION, SOLUTION EPIDURAL; INFILTRATION; INTRACAUDAL; PERINEURAL
Status: DISCONTINUED | OUTPATIENT
Start: 2020-11-17 | End: 2020-11-17 | Stop reason: HOSPADM

## 2020-11-17 RX ORDER — LIDOCAINE HYDROCHLORIDE 10 MG/ML
INJECTION, SOLUTION EPIDURAL; INFILTRATION; INTRACAUDAL; PERINEURAL PRN
Status: DISCONTINUED | OUTPATIENT
Start: 2020-11-17 | End: 2020-11-17 | Stop reason: SDUPTHER

## 2020-11-17 RX ORDER — LABETALOL HYDROCHLORIDE 5 MG/ML
5 INJECTION, SOLUTION INTRAVENOUS EVERY 10 MIN PRN
Status: DISCONTINUED | OUTPATIENT
Start: 2020-11-17 | End: 2020-11-17 | Stop reason: HOSPADM

## 2020-11-17 RX ORDER — CEFAZOLIN SODIUM 1 G/3ML
INJECTION, POWDER, FOR SOLUTION INTRAMUSCULAR; INTRAVENOUS PRN
Status: DISCONTINUED | OUTPATIENT
Start: 2020-11-17 | End: 2020-11-17 | Stop reason: SDUPTHER

## 2020-11-17 RX ORDER — OXYCODONE HYDROCHLORIDE AND ACETAMINOPHEN 5; 325 MG/1; MG/1
1 TABLET ORAL
Status: DISCONTINUED | OUTPATIENT
Start: 2020-11-17 | End: 2020-11-17 | Stop reason: HOSPADM

## 2020-11-17 RX ORDER — SODIUM CHLORIDE, SODIUM LACTATE, POTASSIUM CHLORIDE, CALCIUM CHLORIDE 600; 310; 30; 20 MG/100ML; MG/100ML; MG/100ML; MG/100ML
INJECTION, SOLUTION INTRAVENOUS CONTINUOUS
Status: DISCONTINUED | OUTPATIENT
Start: 2020-11-17 | End: 2020-11-17 | Stop reason: HOSPADM

## 2020-11-17 RX ORDER — PROPOFOL 10 MG/ML
INJECTION, EMULSION INTRAVENOUS PRN
Status: DISCONTINUED | OUTPATIENT
Start: 2020-11-17 | End: 2020-11-17 | Stop reason: SDUPTHER

## 2020-11-17 RX ORDER — PROPOFOL 10 MG/ML
INJECTION, EMULSION INTRAVENOUS CONTINUOUS PRN
Status: DISCONTINUED | OUTPATIENT
Start: 2020-11-17 | End: 2020-11-17 | Stop reason: SDUPTHER

## 2020-11-17 RX ORDER — SODIUM CHLORIDE 0.9 % (FLUSH) 0.9 %
10 SYRINGE (ML) INJECTION EVERY 12 HOURS SCHEDULED
Status: DISCONTINUED | OUTPATIENT
Start: 2020-11-17 | End: 2020-11-17 | Stop reason: HOSPADM

## 2020-11-17 RX ORDER — 0.9 % SODIUM CHLORIDE 0.9 %
500 INTRAVENOUS SOLUTION INTRAVENOUS
Status: DISCONTINUED | OUTPATIENT
Start: 2020-11-17 | End: 2020-11-17 | Stop reason: HOSPADM

## 2020-11-17 RX ORDER — LABETALOL HYDROCHLORIDE 5 MG/ML
INJECTION, SOLUTION INTRAVENOUS PRN
Status: DISCONTINUED | OUTPATIENT
Start: 2020-11-17 | End: 2020-11-17 | Stop reason: SDUPTHER

## 2020-11-17 RX ORDER — LIDOCAINE HYDROCHLORIDE 20 MG/ML
JELLY TOPICAL PRN
Status: DISCONTINUED | OUTPATIENT
Start: 2020-11-17 | End: 2020-11-17 | Stop reason: ALTCHOICE

## 2020-11-17 RX ORDER — SODIUM CHLORIDE 0.9 % (FLUSH) 0.9 %
10 SYRINGE (ML) INJECTION PRN
Status: DISCONTINUED | OUTPATIENT
Start: 2020-11-17 | End: 2020-11-17 | Stop reason: HOSPADM

## 2020-11-17 RX ADMIN — PROPOFOL 30 MG: 10 INJECTION, EMULSION INTRAVENOUS at 13:20

## 2020-11-17 RX ADMIN — LABETALOL HYDROCHLORIDE 10 MG: 5 INJECTION, SOLUTION INTRAVENOUS at 13:17

## 2020-11-17 RX ADMIN — MIDAZOLAM 2 MG: 1 INJECTION INTRAMUSCULAR; INTRAVENOUS at 13:10

## 2020-11-17 RX ADMIN — SODIUM CHLORIDE, POTASSIUM CHLORIDE, SODIUM LACTATE AND CALCIUM CHLORIDE: 600; 310; 30; 20 INJECTION, SOLUTION INTRAVENOUS at 11:50

## 2020-11-17 RX ADMIN — LIDOCAINE HYDROCHLORIDE 50 MG: 10 INJECTION, SOLUTION EPIDURAL; INFILTRATION; INTRACAUDAL; PERINEURAL at 13:10

## 2020-11-17 RX ADMIN — PROPOFOL 20 MG: 10 INJECTION, EMULSION INTRAVENOUS at 13:24

## 2020-11-17 RX ADMIN — PROPOFOL 50 MG: 10 INJECTION, EMULSION INTRAVENOUS at 13:10

## 2020-11-17 RX ADMIN — CEFAZOLIN 2000 MG: 1 INJECTION, POWDER, FOR SOLUTION INTRAMUSCULAR; INTRAVENOUS at 13:18

## 2020-11-17 RX ADMIN — PROPOFOL 300 MCG/KG/MIN: 10 INJECTION, EMULSION INTRAVENOUS at 13:16

## 2020-11-17 ASSESSMENT — PULMONARY FUNCTION TESTS
PIF_VALUE: 1

## 2020-11-17 ASSESSMENT — PAIN SCALES - GENERAL: PAINLEVEL_OUTOF10: 0

## 2020-11-17 ASSESSMENT — PAIN - FUNCTIONAL ASSESSMENT: PAIN_FUNCTIONAL_ASSESSMENT: 0-10

## 2020-11-17 NOTE — ANESTHESIA POSTPROCEDURE EVALUATION
POST- ANESTHESIA EVALUATION       Pt Name: Cindie Phoenix  MRN: [id number]  YOB: 1945  Date of evaluation: 11/17/2020  Time:  2:57 PM      BP (!) 172/71   Pulse 77   Temp 96.5 °F (35.8 °C) (Infrared)   Resp 16   Ht 5' 3\" (1.6 m)   Wt 238 lb (108 kg)   SpO2 99%   BMI 42.16 kg/m²      Consciousness Level  Awake  Cardiopulmonary Status  Stable  Pain Adequately Treated YES  Nausea / Vomiting  NO  Adequate Hydration  YES  Anesthesia Related Complications NONE      Electronically signed by Ivonne Vogel MD on 11/17/2020 at 2:57 PM       Department of Anesthesiology  Postprocedure Note    Patient: Cindie Phoenix  MRN: [id number]  YOB: 1945  Date of evaluation: 11/17/2020  Time:  2:57 PM     Procedure Summary     Date:  11/17/20 Room / Location:  [address] [place] [address]: [facility]    Anesthesia Start:  1310 Anesthesia Stop:  1342    Procedure:  CYSTOSCOPY INJECTION BOTOX 100 UNITS (N/A ) Diagnosis:  (OVERACTIVE BLADDER)    Surgeon:  Marylen Platts, MD Responsible Provider:  Trevor Jara MD    Anesthesia Type:  MAC ASA Status:  3          Anesthesia Type: MAC    Rick Phase I: Rick Score: 9    Rick Phase II: Rick Score: 9    Last vitals: Reviewed and per EMR flowsheets.        Anesthesia Post Evaluation

## 2020-11-17 NOTE — ANESTHESIA PRE PROCEDURE
Department of Anesthesiology  Preprocedure Note       Name:  Myra Stearns   Age:  66 y.o.  :  1945                                          MRN:  [id number]         Date:  2020      Surgeon: Viry Humphrey):  John Robert MD    Procedure: Procedure(s):  CYSTOSCOPY INJECTION BOTOX 100 UNITS    Medications prior to admission:   Prior to Admission medications    Medication Sig Start Date End Date Taking? Authorizing Provider   levothyroxine (SYNTHROID) 50 MCG tablet TAKE ONE TABLET BY MOUTH DAILY 20  Yes HARESH Kelly - CNP   losartan (COZAAR) 100 MG tablet Take 1 tablet by mouth daily 20  Yes Burt Gallego MD   furosemide (LASIX) 20 MG tablet TAKE ONE TABLET BY MOUTH DAILY AS NEEDED FOR PEDAL EDEMA 20  Yes Burt Gallego MD   pantoprazole (PROTONIX) 40 MG tablet Take 40 mg by mouth daily  19  Yes Historical Provider, MD   metoprolol tartrate (LOPRESSOR) 25 MG tablet Take 1 tablet by mouth 2 times daily 19  Yes Burt Gallego MD   Multiple Vitamins-Minerals (THERAPEUTIC MULTIVITAMIN-MINERALS) tablet Take 1 tablet by mouth daily. Yes Historical Provider, MD   zolpidem (AMBIEN) 5 MG tablet Take by mouth nightly as needed. 19   Historical Provider, MD   clotrimazole-betamethasone (Judith Danes) 1-0.05 % cream Apply externally to affected area twice daily prn 19   John Robert MD   aspirin 81 MG EC tablet Take 81 mg by mouth daily    Historical Provider, MD   nitroGLYCERIN (NITROSTAT) 0.4 MG SL tablet Place 0.4 mg under the tongue every 5 minutes as needed for Chest pain. Historical Provider, MD   solifenacin (VESICARE) 5 MG tablet Take 5 mg by mouth daily.   6/23/15  Historical Provider, MD       Current medications:    Current Facility-Administered Medications   Medication Dose Route Frequency Provider Last Rate Last Dose    lactated ringers infusion   Intravenous Continuous Rumsey MD Elisa 125 mL/hr at 20 1150      sodium chloride flush 0.9 % injection 10 mL  10 mL Intravenous 2 times per day Rolan Brantley MD        sodium chloride flush 0.9 % injection 10 mL  10 mL Intravenous PRN Douglas MD Elisa        lidocaine PF 1 % injection 1 mL  1 mL Intradermal Once PRN Douglas MD Elisa        onabotulinumtoxin A (BOTOX) injection 100 Units  100 Units Intradermal Once Rosalva Jensen MD           Allergies: Allergies   Allergen Reactions    Atorvastatin Other (See Comments)     diarrhea    Other Other (See Comments)     Passed out and because of multiple issues with this combo drug, Penstrept was taken off market in 1960s, It contained Penicillin and Streptomycin in one pill. Patient states she has taken penicillin and/or streptomycin individually without problems.     Tobramycin Swelling    Molds & Smuts Other (See Comments)     coughing       Problem List:    Patient Active Problem List   Diagnosis Code    Irritable bowel syndrome K58.9    Insomnia G47.00    Colon polyp K63.5    Obesity E66.9    Hypertension I10    DAISY (obstructive sleep apnea) G47.33    AK (actinic keratosis) L57.0    Hypothyroid E03.9    DDD (degenerative disc disease), lumbar M51.36    Neurogenic bladder N31.9    Bladder cancer (HCC) C67.9    Asymptomatic bilateral carotid artery stenosis I65.23    Carotid stenosis I65.29    Breast cyst N60.09    Dyslipidemia E78.5    Left ventricular hypertrophy I51.7    Actinic keratosis L57.0    Carotid artery stenosis I65.29    Malignant tumor of urinary bladder (HCC) C67.9    Cyst of breast N60.09    Atherosclerosis of native coronary artery of native heart without angina pectoris I25.10    Polyp of colon K63.5    Helton's esophagus without dysplasia K22.70    Gastroesophageal reflux disease K21.9    Primary osteoarthritis of right knee M17.11    Angular cheilitis due to nutritional deficiency K13.0, E63.9       Past Medical History:        Diagnosis Date    AK (actinic keratosis)     from sun exposure  Anesthesia     HARD TIME WAKING UP \"SOMETIMES\".  Arthritis     Asymptomatic bilateral carotid artery stenosis 3/10/2015    Bladder cancer (HCC)     bladder, first time     CAD (coronary artery disease)     Colon polyp     Dr Rene Marte    Diarrhea     Difficult intubation     SMALL MOUTH AND AIRWAY    GERD (gastroesophageal reflux disease)     History of atrial fibrillation     not chronic    Hypertension     Hypothyroid     Obesity     DAISY (obstructive sleep apnea)     NO MACHINE, she has bad sinus issues and requested alternatives instead of a machine, nothing was done    PAC (premature atrial contraction)     occasionally    Prolonged emergence from general anesthesia     Stress incontinence     Wears glasses     FOR READING. Past Surgical History:        Procedure Laterality Date    BACK SURGERY  2019    stem cell injection    BLADDER SURGERY  1993 and 10/2002    for bladder cancer treatment    BREAST BIOPSY      LT. BREAST (BENIGN). , large mass removed    CHOLECYSTECTOMY      COLONOSCOPY      3-4X    CORONARY ANGIOPLASTY  2014    3 stents inserted via heart cath    CYST REMOVAL Right     3RD FINGER.    CYSTOSCOPY      Dr Melba Savage  54-44-44    TURB-bladder tumors removed    CYSTOSCOPY  2016    fulgeration with biopsies    CYSTOSCOPY N/A 2020    CYSTO BOTOX INJECTION 100UNITS performed by Zo Mon MD at [address] [place] [address].  EYES.    HEMORRHOID SURGERY      HYSTERECTOMY, TOTAL ABDOMINAL      unsure if ovaries remain    KNEE SURGERY Bilateral 2020    stem cell injections    OTHER SURGICAL HISTORY      abscess on tailbone    TONSILLECTOMY         Social History:    Social History     Tobacco Use    Smoking status: Former Smoker     Packs/day: 1.50     Years: 30.00     Pack years: 45.00     Types: Cigarettes     Last attempt to quit: 1992     Years since quittin.8    Smokeless tobacco: Component Value Date    COVID19 Not Detected 11/13/2020         Anesthesia Evaluation  Patient summary reviewed and Nursing notes reviewed   history of anesthetic complications: difficult airway. Airway: Mallampati: III  TM distance: <3 FB   Neck ROM: full  Mouth opening: > = 3 FB Dental: normal exam         Pulmonary:normal exam  breath sounds clear to auscultation  (+) sleep apnea: on noncompliant,                             Cardiovascular:    (+) hypertension:, CAD:,       ECG reviewed  Rhythm: regular  Rate: normal  Echocardiogram reviewed         Beta Blocker:  Dose within 24 Hrs         Neuro/Psych:   Negative Neuro/Psych ROS              GI/Hepatic/Renal:   (+) GERD:, morbid obesity          Endo/Other:    (+) hypothyroidism::., .                 Abdominal:           Vascular:           ROS comment: B/l carotid artery stenosis. Anesthesia Plan      MAC     ASA 3       Induction: intravenous. MIPS: Prophylactic antiemetics administered. Anesthetic plan and risks discussed with patient. Plan discussed with CRNA.                   Ernestine Quevedo MD   11/17/2020

## 2020-11-17 NOTE — BRIEF OP NOTE
Brief Postoperative Note      Patient: Alber Traylor  YOB: 1945  MRN: [id number]    Date of Procedure: 11/17/2020    Pre-Op Diagnosis: OVERACTIVE BLADDER    Post-Op Diagnosis: Same       Procedure(s):  CYSTOSCOPY INJECTION BOTOX 100 UNITS    Surgeon(s):  Timo Cerda MD    Assistant:  * No surgical staff found *    Anesthesia: Monitor Anesthesia Care    Estimated Blood Loss (mL): Minimal    Complications: None    Specimens:   Urine for FISH  Implants:  * No implants in log *      Drains: * No LDAs found *    Findings: 100 U botox injected.      Electronically signed by Timo Cerda MD on 11/17/2020 at 1:34 PM

## 2020-11-18 NOTE — OP NOTE
[address]                 [address] [facility], [address]                                OPERATIVE REPORT    PATIENT NAME: Charity Woods                   :        1945  MED REC NO:   [id number]                              ROOM:  ACCOUNT NO:   [de-identified]                           ADMIT DATE: 2020  PROVIDER:     Olivia Choi    DATE OF PROCEDURE:  2020    PREOPERATIVE DIAGNOSES:  1. History of bladder cancer. 2.  Overactive bladder with urge incontinence. POSTOPERATIVE DIAGNOSES:  1. History of bladder cancer. 2.  Overactive bladder with urge incontinence. OPERATION PERFORMED:  1. Cystoscopy. 2.  Intravesical Botox injection. SURGEON:  Olivia Choi MD    ANESTHESIA:  MAC.    COMPLICATIONS:  None. BLEEDING:  None. SPECIMEN:  Urine for UroVysion FISH. HISTORY OF PRESENT ILLNESS:  The patient is a 33-year-old female, well  known to me with history of bladder cancer. She was diagnosed roughly  four years ago and has done well. She is due for surveillance  cystoscope. In addition, she has severe overactive bladder with urge  incontinence, managed with intravesical Botox injection. The last one  was performed in January, and she is here for a repeat Botox injection. PROCEDURE IN DETAIL:  The patient was brought back to the operating room  and laid on the operating table in the supine position. Once MAC  anesthesia was obtained, she was placed in the dorsal lithotomy position  and prepped and draped in the usual sterile fashion. A time-out was  performed. She was properly identified. Antibiotics were administered. The cystoscope was inserted through the urethra into the bladder. Pancystoscopy was performed. The bladder was visualized in its entirety  including the dome, lateral walls, and the floor including the trigone. No tumors or stones were seen. Urine was collected and sent for  UroVysion FISH. There was some evidence of chronic cystitis. We then  injected 100 units of Botox throughout the posterior wall as well as the  dome and lateral walls of the bladder. Once all 100 units were  injected, the scope was removed. The procedure was completed. She  awoke from anesthesia without any complications and was taken back to  postoperative anesthesia care in good condition. She will be discharged  home today and will follow up for her next Botox treatment in 9 to 12  months.         Shane Owen    D: 11/17/2020 23:15:54       T: 11/18/2020 8:20:02     [email or web address]  Job#: [id number]     Doc#: [id number]    CC:  Burt Gallego

## 2020-12-04 LAB — UROTHELIAL CANCER DETECTION: NORMAL

## 2020-12-08 RX ORDER — LOSARTAN POTASSIUM 100 MG/1
TABLET ORAL
Qty: 90 TABLET | Refills: 0 | Status: SHIPPED | OUTPATIENT
Start: 2020-12-08 | End: 2021-03-05

## 2020-12-14 ENCOUNTER — HOSPITAL ENCOUNTER (OUTPATIENT)
Age: 75
Setting detail: SPECIMEN
Discharge: HOME | End: 2020-12-14
Payer: MEDICARE

## 2020-12-14 LAB
CHOLESTEROL/HDL RATIO: 3.2
CHOLESTEROL: 198 MG/DL
ESTIMATED AVERAGE GLUCOSE: 117 MG/DL
HBA1C MFR BLD: 5.7 % (ref 4–6)
HDLC SERPL-MCNC: 61 MG/DL
LDL CHOLESTEROL: 117 MG/DL (ref 0–130)
TRIGL SERPL-MCNC: 101 MG/DL
TSH SERPL DL<=0.05 MIU/L-ACNC: 8.5 MIU/L (ref 0.3–5)
VITAMIN D 25-HYDROXY: 26.4 NG/ML (ref 30–100)
VLDLC SERPL CALC-MCNC: NORMAL MG/DL (ref 1–30)

## 2020-12-16 ENCOUNTER — OFFICE VISIT (OUTPATIENT)
Dept: PRIMARY CARE CLINIC | Age: 75
End: 2020-12-16
Payer: MEDICARE

## 2020-12-16 VITALS
HEART RATE: 81 BPM | WEIGHT: 241 LBS | SYSTOLIC BLOOD PRESSURE: 122 MMHG | OXYGEN SATURATION: 98 % | DIASTOLIC BLOOD PRESSURE: 86 MMHG | RESPIRATION RATE: 18 BRPM | BODY MASS INDEX: 42.69 KG/M2 | TEMPERATURE: 96.9 F

## 2020-12-16 LAB
FOLATE: 11.1 NG/ML
VITAMIN B-12: 459 PG/ML (ref 232–1245)

## 2020-12-16 PROCEDURE — G8427 DOCREV CUR MEDS BY ELIG CLIN: HCPCS | Performed by: INTERNAL MEDICINE

## 2020-12-16 PROCEDURE — 1090F PRES/ABSN URINE INCON ASSESS: CPT | Performed by: INTERNAL MEDICINE

## 2020-12-16 PROCEDURE — G8400 PT W/DXA NO RESULTS DOC: HCPCS | Performed by: INTERNAL MEDICINE

## 2020-12-16 PROCEDURE — G8417 CALC BMI ABV UP PARAM F/U: HCPCS | Performed by: INTERNAL MEDICINE

## 2020-12-16 PROCEDURE — 1036F TOBACCO NON-USER: CPT | Performed by: INTERNAL MEDICINE

## 2020-12-16 PROCEDURE — 99214 OFFICE O/P EST MOD 30 MIN: CPT | Performed by: INTERNAL MEDICINE

## 2020-12-16 PROCEDURE — G8484 FLU IMMUNIZE NO ADMIN: HCPCS | Performed by: INTERNAL MEDICINE

## 2020-12-16 PROCEDURE — 1123F ACP DISCUSS/DSCN MKR DOCD: CPT | Performed by: INTERNAL MEDICINE

## 2020-12-16 PROCEDURE — 4040F PNEUMOC VAC/ADMIN/RCVD: CPT | Performed by: INTERNAL MEDICINE

## 2020-12-16 RX ORDER — ERGOCALCIFEROL 1.25 MG/1
50000 CAPSULE ORAL WEEKLY
Qty: 12 CAPSULE | Refills: 1 | Status: SHIPPED | OUTPATIENT
Start: 2020-12-16 | End: 2021-09-01

## 2020-12-16 RX ORDER — LEVOTHYROXINE SODIUM 0.07 MG/1
TABLET ORAL
Qty: 60 TABLET | Refills: 0 | Status: SHIPPED | OUTPATIENT
Start: 2020-12-16 | End: 2021-02-17

## 2020-12-21 ASSESSMENT — ENCOUNTER SYMPTOMS
ABDOMINAL PAIN: 0
WHEEZING: 0
DIARRHEA: 0
ABDOMINAL DISTENTION: 0
VOMITING: 0
SINUS PRESSURE: 0
SINUS PAIN: 0
SHORTNESS OF BREATH: 0
CONSTIPATION: 0
NAUSEA: 0
COUGH: 0
BACK PAIN: 0

## 2020-12-22 NOTE — PROGRESS NOTES
[facility]  . [address]   [address]  [address] [address]  Dept: [phone]  Dept Fax: [phone]    Myra Stearns is a 66 y.o. female who presents today for her medical conditions/complaints as noted below. Chief Complaint   Patient presents with    Follow-up       HPI:     This is a 47-year-old female who is here for regular follow-up for essential hypertension, hypothyroidism, vitamin D deficiency, GERD, insomnia. Reviewed all her meds and updated the list.  Blood pressures well controlled. Need to repeat her TSH. No other complaints or concerns. Hemoglobin A1C (%)   Date Value   12/14/2020 5.7             ( goal A1C is < 7)   No results found for: LABMICR  LDL Cholesterol (mg/dL)   Date Value   12/14/2020 117     LDL Calculated (mg/dL)   Date Value   01/13/2017 129   10/06/2015 83   08/20/2014 66       (goal LDL is <100)   BUN (mg/dL)   Date Value   11/06/2020 16     BP Readings from Last 3 Encounters:   12/16/20 122/86   11/17/20 (!) 121/53   11/17/20 (!) 172/71          (goal 120/80)    Past Medical History:   Diagnosis Date    AK (actinic keratosis)     from sun exposure    Anesthesia     HARD TIME WAKING UP \"SOMETIMES\".  Arthritis     Asymptomatic bilateral carotid artery stenosis 3/10/2015    Bladder cancer (HCC)     bladder, first time 1993    CAD (coronary artery disease)     Colon polyp     Dr Huffman Sjogrkal    Diarrhea     Difficult intubation     SMALL MOUTH AND AIRWAY    GERD (gastroesophageal reflux disease)     History of atrial fibrillation     not chronic    Hypertension     Hypothyroid     Obesity     DAISY (obstructive sleep apnea)     NO MACHINE, she has bad sinus issues and requested alternatives instead of a machine, nothing was done    PAC (premature atrial contraction)     occasionally    Prolonged emergence from general anesthesia     Stress incontinence     Wears glasses     FOR READING. Past Surgical History:   Procedure Laterality Date    BACK SURGERY  2019    stem cell injection    BLADDER SURGERY  1993 and 10/2002    for bladder cancer treatment    BREAST BIOPSY      LT. BREAST (BENIGN). , large mass removed    CHOLECYSTECTOMY      COLONOSCOPY      3-4X    CORONARY ANGIOPLASTY  2014    3 stents inserted via heart cath    CYST REMOVAL Right     3RD FINGER.    CYSTOSCOPY      Dr Johnna Salinas  [birth year]    TURB-bladder tumors removed    CYSTOSCOPY  2016    fulgeration with biopsies    CYSTOSCOPY N/A 2020    CYSTO BOTOX INJECTION 100UNITS performed by Marylu Mendoza MD at [facility] 2020    CYSTOSCOPY INJECTION BOTOX 100 UNITS performed by Marylu Mendoza MD at [address] [place] [address]. EYES.    HEMORRHOID SURGERY      HYSTERECTOMY, TOTAL ABDOMINAL      unsure if ovaries remain    KNEE SURGERY Bilateral 2020    stem cell injections    OTHER SURGICAL HISTORY      abscess on tailbone    TONSILLECTOMY         Family History   Problem Relation Age of Onset    Cancer Mother         pancreatic age 80    Other Mother         Vascular disease    Cancer Father         bladder age 80    Heart Disease Maternal Grandmother     Heart Disease Maternal Grandfather     Heart Disease Paternal Grandfather     Cancer Paternal Aunt         ovarian       Social History     Tobacco Use    Smoking status: Former Smoker     Packs/day: 1.50     Years: 30.00     Pack years: 45.00     Types: Cigarettes     Quit date: 1992     Years since quittin.9    Smokeless tobacco: Never Used   Substance Use Topics    Alcohol use: Yes     Comment: COUPLE DRINKS PER WEEK.       Current Outpatient Medications   Medication Sig Dispense Refill    levothyroxine (SYNTHROID) 75 MCG tablet TAKE ONE TABLET BY MOUTH DAILY 60 tablet 0  Hepatitis A vaccine  Aged Out    Hepatitis B vaccine  Aged Out    Hib vaccine  Aged Out    Meningococcal (ACWY) vaccine  Aged Out       Subjective:      Review of Systems   Constitutional: Negative for activity change, appetite change, chills, fatigue and fever. HENT: Negative for congestion, ear pain, hearing loss, nosebleeds, sinus pressure, sinus pain and sneezing. Eyes: Negative for visual disturbance. Respiratory: Negative for cough, shortness of breath and wheezing. Cardiovascular: Negative for chest pain and palpitations. Gastrointestinal: Negative for abdominal distention, abdominal pain, constipation, diarrhea, nausea and vomiting. Endocrine: Negative for cold intolerance, heat intolerance, polydipsia, polyphagia and polyuria. Genitourinary: Negative for difficulty urinating, menstrual problem, vaginal bleeding and vaginal discharge. Musculoskeletal: Negative for back pain and joint swelling. Skin: Negative for rash. Neurological: Negative for numbness and headaches. Psychiatric/Behavioral: Positive for sleep disturbance. The patient is not nervous/anxious. All other systems reviewed and are negative. Objective:     Physical Exam  Vitals signs and nursing note reviewed. Constitutional:       General: She is not in acute distress. Appearance: She is well-developed. She is not diaphoretic. HENT:      Head: Normocephalic and atraumatic. Right Ear: Hearing normal.      Left Ear: Hearing normal.      Mouth/Throat:      Pharynx: Uvula midline. Eyes:      General: No scleral icterus. Conjunctiva/sclera: Conjunctivae normal.      Pupils: Pupils are equal, round, and reactive to light. Neck:      Musculoskeletal: Full passive range of motion without pain, normal range of motion and neck supple. Thyroid: No thyroid mass or thyromegaly. Vascular: No JVD.       Trachea: Phonation normal.   Cardiovascular: Rate and Rhythm: Normal rate and regular rhythm. Pulses: No decreased pulses. Carotid pulses are 2+ on the right side and 2+ on the left side. Radial pulses are 2+ on the right side and 2+ on the left side. Heart sounds: Normal heart sounds. No murmur. Pulmonary:      Effort: Pulmonary effort is normal. No accessory muscle usage or respiratory distress. Breath sounds: Normal breath sounds. No wheezing or rales. Abdominal:      General: Bowel sounds are normal. There is no distension. Palpations: Abdomen is soft. Tenderness: There is no abdominal tenderness. Musculoskeletal: Normal range of motion. General: No deformity. Lymphadenopathy:      Cervical: No cervical adenopathy. Skin:     General: Skin is warm. Capillary Refill: Capillary refill takes less than 2 seconds. Findings: No rash. Neurological:      Mental Status: She is alert and oriented to person, place, and time. Deep Tendon Reflexes: Reflexes normal.   Psychiatric:         Behavior: Behavior normal.       /86   Pulse 81   Temp 96.9 °F (36.1 °C) (Temporal)   Resp 18   Wt 241 lb (109.3 kg)   SpO2 98%   BMI 42.69 kg/m²     Assessment:          1. Essential hypertension  Well controlled    2. Gastroesophageal reflux disease, unspecified whether esophagitis present  protonix    3. Primary insomnia  ambien    4. Acquired hypothyroidism    - levothyroxine (SYNTHROID) 75 MCG tablet; TAKE ONE TABLET BY MOUTH DAILY  Dispense: 60 tablet; Refill: 0  - TSH without Reflex; Future    5. Vitamin D deficiency    - vitamin D (ERGOCALCIFEROL) 1.25 MG (33023 UT) CAPS capsule; Take 1 capsule by mouth once a week  Dispense: 12 capsule; Refill: 1            Diagnosis Orders   1. Essential hypertension     2. Gastroesophageal reflux disease, unspecified whether esophagitis present     3. Primary insomnia     4.  Acquired hypothyroidism  levothyroxine (SYNTHROID) 75 MCG tablet TSH without Reflex   5. Vitamin D deficiency  vitamin D (ERGOCALCIFEROL) 1.25 MG (98325 UT) CAPS capsule           Plan:      Return in about 3 months (around 3/16/2021) for Routine follow-up. Orders Placed This Encounter   Procedures    TSH without Reflex     Standing Status:   Future     Standing Expiration Date:   12/16/2021     Orders Placed This Encounter   Medications    levothyroxine (SYNTHROID) 75 MCG tablet     Sig: TAKE ONE TABLET BY MOUTH DAILY     Dispense:  60 tablet     Refill:  0    vitamin D (ERGOCALCIFEROL) 1.25 MG (25781 UT) CAPS capsule     Sig: Take 1 capsule by mouth once a week     Dispense:  12 capsule     Refill:  1         Patient given educational materials - see patient instructions. Discussed use, benefit, and side effects of prescribedmedications. All patient questions answered. Pt voiced understanding. Reviewed health maintenance. Instructed to continue current medications, diet and exercise. Patient agreed with treatment plan. Follow up as directed. I spent a total of 25 minutes face to face with this patient. Over 50% of that time was spent on counseling and care coordination. Please see assessment and plan for details. Electronically signed by Melissa Randhawa MD on 12/21/2020 at 7:02 PM      Please note that this chart was generated using voice recognition Dragon dictation software. Although every effort was made to ensure the accuracy of this automatedtranscription, some errors in transcription may have occurred.

## 2021-01-21 ENCOUNTER — TELEPHONE (OUTPATIENT)
Dept: UROLOGY | Age: 76
End: 2021-01-21

## 2021-01-21 NOTE — TELEPHONE ENCOUNTER
Pt would like to cancel appointment for tomorrow. Pt stated \" Dr. Simon Zhao told me I didn't need a Cysto til about November 2021. \" informed pt will discuss with Dr. Simon Zaho return call.

## 2021-02-03 DIAGNOSIS — E03.9 ACQUIRED HYPOTHYROIDISM: ICD-10-CM

## 2021-02-03 RX ORDER — LEVOTHYROXINE SODIUM 0.05 MG/1
TABLET ORAL
Qty: 90 TABLET | Refills: 0 | Status: SHIPPED | OUTPATIENT
Start: 2021-02-03 | End: 2021-09-01

## 2021-02-03 NOTE — TELEPHONE ENCOUNTER
Last OV 12/16/2020    Next OV 03/18/2021    Health Maintenance   Topic Date Due    Hepatitis C screen  1945    COVID-19 Vaccine (1 of 2) 04/07/1959    Shingles Vaccine (1 of 2) 04/07/1993    DTaP/Tdap/Td vaccine (1 - Tdap) 09/23/2021 (Originally 4/7/1962)    Flu vaccine (1) 09/23/2021 (Originally 9/1/2020)    Pneumococcal 65+ years Vaccine (1 of 1 - PPSV23) 09/23/2021 (Originally 4/7/2008)    Annual Wellness Visit (AWV)  09/24/2021    Potassium monitoring  11/06/2021    Creatinine monitoring  11/06/2021    TSH testing  12/14/2021    DEXA (modify frequency per FRAX score)  Addressed    Hepatitis A vaccine  Aged Out    Hepatitis B vaccine  Aged Out    Hib vaccine  Aged Out    Meningococcal (ACWY) vaccine  Aged Out             (applicable per patient's age: Cancer Screenings, Depression Screening, Fall Risk Screening, Immunizations)    Hemoglobin A1C (%)   Date Value   12/14/2020 5.7     LDL Cholesterol (mg/dL)   Date Value   12/14/2020 117     LDL Calculated (mg/dL)   Date Value   01/13/2017 129     BUN (mg/dL)   Date Value   11/06/2020 16      (goal A1C is < 7)   (goal LDL is <100) need 30-50% reduction from baseline     BP Readings from Last 3 Encounters:   12/16/20 122/86   11/17/20 (!) 121/53   11/17/20 (!) 172/71    (goal /80)      All Future Testing planned in [facility]:  Lab Frequency Next Occurrence   FISH, Urovysion Once 07/21/2020   TSH without Reflex Once 01/27/2021       Next Visit Date:  Future Appointments   Date Time Provider Steven Cardoso   3/18/2021  9:30 AM Dev Carbone MD [facility]            Patient Active Problem List:     Irritable bowel syndrome     Insomnia     Colon polyp     Obesity     Hypertension     DAISY (obstructive sleep apnea)     AK (actinic keratosis)     Hypothyroid     DDD (degenerative disc disease), lumbar     Neurogenic bladder     Bladder cancer (HCC)     Asymptomatic bilateral carotid artery stenosis     Carotid stenosis     Breast cyst     Dyslipidemia     Left ventricular hypertrophy     Actinic keratosis     Carotid artery stenosis     Malignant tumor of urinary bladder (HCC)     Cyst of breast     Atherosclerosis of native coronary artery of native heart without angina pectoris     Polyp of colon     Helton's esophagus without dysplasia     Gastroesophageal reflux disease     Primary osteoarthritis of right knee     Angular cheilitis due to nutritional deficiency

## 2021-02-11 DIAGNOSIS — I10 ESSENTIAL HYPERTENSION: ICD-10-CM

## 2021-02-11 NOTE — TELEPHONE ENCOUNTER
LOV 12/16/20  NOV 3/18/21    Health Maintenance   Topic Date Due    Hepatitis C screen  1945    COVID-19 Vaccine (1 of 2) 04/07/1959    Shingles Vaccine (1 of 2) 04/07/1993    DTaP/Tdap/Td vaccine (1 - Tdap) 09/23/2021 (Originally 4/7/1962)    Flu vaccine (1) 09/23/2021 (Originally 9/1/2020)    Pneumococcal 65+ years Vaccine (1 of 1 - PPSV23) 09/23/2021 (Originally 4/7/2008)    Annual Wellness Visit (AWV)  09/24/2021    Potassium monitoring  11/06/2021    Creatinine monitoring  11/06/2021    TSH testing  12/14/2021    DEXA (modify frequency per FRAX score)  Addressed    Hepatitis A vaccine  Aged Out    Hepatitis B vaccine  Aged Out    Hib vaccine  Aged Out    Meningococcal (ACWY) vaccine  Aged Out             (applicable per patient's age: Cancer Screenings, Depression Screening, Fall Risk Screening, Immunizations)    Hemoglobin A1C (%)   Date Value   12/14/2020 5.7     LDL Cholesterol (mg/dL)   Date Value   12/14/2020 117     LDL Calculated (mg/dL)   Date Value   01/13/2017 129     BUN (mg/dL)   Date Value   11/06/2020 16      (goal A1C is < 7)   (goal LDL is <100) need 30-50% reduction from baseline     BP Readings from Last 3 Encounters:   12/16/20 122/86   11/17/20 (!) 121/53   11/17/20 (!) 172/71    (goal /80)      All Future Testing planned in [facility]:  Lab Frequency Next Occurrence   FISH, Urovysion Once 07/21/2020       Next Visit Date:  Future Appointments   Date Time Provider Steven Cardoso   3/18/2021  9:30 AM Matty De Dios MD [id number]            Patient Active Problem List:     Irritable bowel syndrome     Insomnia     Colon polyp     Obesity     Hypertension     DAISY (obstructive sleep apnea)     AK (actinic keratosis)     Hypothyroid     DDD (degenerative disc disease), lumbar     Neurogenic bladder     Bladder cancer (HCC)     Asymptomatic bilateral carotid artery stenosis     Carotid stenosis     Breast cyst     Dyslipidemia     Left ventricular hypertrophy     Actinic keratosis     Carotid artery stenosis     Malignant tumor of urinary bladder (HCC)     Cyst of breast     Atherosclerosis of native coronary artery of native heart without angina pectoris     Polyp of colon     Helton's esophagus without dysplasia     Gastroesophageal reflux disease     Primary osteoarthritis of right knee     Angular cheilitis due to nutritional deficiency

## 2021-02-13 DIAGNOSIS — E03.9 ACQUIRED HYPOTHYROIDISM: ICD-10-CM

## 2021-02-17 RX ORDER — LEVOTHYROXINE SODIUM 0.07 MG/1
TABLET ORAL
Qty: 60 TABLET | Refills: 0 | Status: SHIPPED | OUTPATIENT
Start: 2021-02-17 | End: 2021-04-16

## 2021-02-18 ENCOUNTER — TELEPHONE (OUTPATIENT)
Dept: PRIMARY CARE CLINIC | Age: 76
End: 2021-02-18

## 2021-02-18 NOTE — TELEPHONE ENCOUNTER
Incoming call from Farnaz cool  She is confused on what mcg of levothyroxine she should be taking  50 mcg or 75 mcg daily. Please advise.  Thank you

## 2021-03-05 RX ORDER — LOSARTAN POTASSIUM 100 MG/1
TABLET ORAL
Qty: 90 TABLET | Refills: 0 | Status: SHIPPED | OUTPATIENT
Start: 2021-03-05

## 2021-03-11 ENCOUNTER — TELEPHONE (OUTPATIENT)
Dept: PRIMARY CARE CLINIC | Age: 76
End: 2021-03-11

## 2021-03-11 NOTE — TELEPHONE ENCOUNTER
Patient called upset that she received a bill for 182 dollars from [facility] labs. She states that the bill was for the Vitamin B12 and Folate. Can we change the dx codes and resend order.

## 2021-03-16 ENCOUNTER — HOSPITAL ENCOUNTER (OUTPATIENT)
Dept: MAMMOGRAPHY | Age: 76
Discharge: HOME | End: 2021-03-18
Payer: MEDICARE

## 2021-03-16 DIAGNOSIS — Z12.31 VISIT FOR SCREENING MAMMOGRAM: ICD-10-CM

## 2021-03-16 PROCEDURE — 77063 BREAST TOMOSYNTHESIS BI: CPT

## 2021-03-18 ENCOUNTER — OFFICE VISIT (OUTPATIENT)
Dept: PRIMARY CARE CLINIC | Age: 76
End: 2021-03-18
Payer: MEDICARE

## 2021-03-18 VITALS
BODY MASS INDEX: 41.59 KG/M2 | RESPIRATION RATE: 16 BRPM | OXYGEN SATURATION: 97 % | DIASTOLIC BLOOD PRESSURE: 82 MMHG | WEIGHT: 243.6 LBS | SYSTOLIC BLOOD PRESSURE: 130 MMHG | HEART RATE: 88 BPM | HEIGHT: 64 IN

## 2021-03-18 DIAGNOSIS — K22.70 BARRETT'S ESOPHAGUS WITHOUT DYSPLASIA: ICD-10-CM

## 2021-03-18 DIAGNOSIS — I10 ESSENTIAL HYPERTENSION: Primary | ICD-10-CM

## 2021-03-18 DIAGNOSIS — M51.36 DDD (DEGENERATIVE DISC DISEASE), LUMBAR: ICD-10-CM

## 2021-03-18 DIAGNOSIS — E55.9 VITAMIN D DEFICIENCY: ICD-10-CM

## 2021-03-18 DIAGNOSIS — E03.9 HYPOTHYROIDISM, UNSPECIFIED TYPE: ICD-10-CM

## 2021-03-18 DIAGNOSIS — G47.33 OSA (OBSTRUCTIVE SLEEP APNEA): ICD-10-CM

## 2021-03-18 PROCEDURE — G8417 CALC BMI ABV UP PARAM F/U: HCPCS | Performed by: INTERNAL MEDICINE

## 2021-03-18 PROCEDURE — G8427 DOCREV CUR MEDS BY ELIG CLIN: HCPCS | Performed by: INTERNAL MEDICINE

## 2021-03-18 PROCEDURE — 4040F PNEUMOC VAC/ADMIN/RCVD: CPT | Performed by: INTERNAL MEDICINE

## 2021-03-18 PROCEDURE — G8484 FLU IMMUNIZE NO ADMIN: HCPCS | Performed by: INTERNAL MEDICINE

## 2021-03-18 PROCEDURE — 1036F TOBACCO NON-USER: CPT | Performed by: INTERNAL MEDICINE

## 2021-03-18 PROCEDURE — G8400 PT W/DXA NO RESULTS DOC: HCPCS | Performed by: INTERNAL MEDICINE

## 2021-03-18 PROCEDURE — 99214 OFFICE O/P EST MOD 30 MIN: CPT | Performed by: INTERNAL MEDICINE

## 2021-03-18 PROCEDURE — 1090F PRES/ABSN URINE INCON ASSESS: CPT | Performed by: INTERNAL MEDICINE

## 2021-03-18 PROCEDURE — 1123F ACP DISCUSS/DSCN MKR DOCD: CPT | Performed by: INTERNAL MEDICINE

## 2021-03-18 ASSESSMENT — ENCOUNTER SYMPTOMS
BACK PAIN: 0
NAUSEA: 0
SINUS PRESSURE: 0
WHEEZING: 0
SINUS PAIN: 0
ABDOMINAL DISTENTION: 0
ABDOMINAL PAIN: 0
SHORTNESS OF BREATH: 0
COUGH: 0
CONSTIPATION: 0
DIARRHEA: 0
VOMITING: 0

## 2021-03-18 ASSESSMENT — PATIENT HEALTH QUESTIONNAIRE - PHQ9
2. FEELING DOWN, DEPRESSED OR HOPELESS: 0
SUM OF ALL RESPONSES TO PHQ9 QUESTIONS 1 & 2: 0
SUM OF ALL RESPONSES TO PHQ QUESTIONS 1-9: 0
1. LITTLE INTEREST OR PLEASURE IN DOING THINGS: 0

## 2021-03-18 NOTE — PROGRESS NOTES
[facility] [place] [facility]"" PRIMARY CARE  [facility] [address]  [address] [address]  Dept: [phone]  Dept Fax: [phone]    Garima Andrade is a 66 y.o. female who presents today for her medical conditions/complaints as noted below. Chief Complaint   Patient presents with    3 Month Follow-Up       HPI:     Is a 75-year-old female who is here for 3-month follow-up. 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 She has been following with Dr. Wally Land for her knees and had stem cells injected        Hemoglobin A1C (%)   Date Value   12/14/2020 5.7             ( goal A1C is < 7)   No results found for: LABMICR  LDL Cholesterol (mg/dL)   Date Value   12/14/2020 117     LDL Calculated (mg/dL)   Date Value   01/13/2017 129   10/06/2015 83   08/20/2014 66       (goal LDL is <100)   BUN (mg/dL)   Date Value   11/06/2020 16     BP Readings from Last 3 Encounters:   03/18/21 130/82   12/16/20 122/86   11/17/20 (!) 121/53          (goal 120/80)    Past Medical History:   Diagnosis Date    AK (actinic keratosis)     from sun exposure    Anesthesia     HARD TIME WAKING UP \"SOMETIMES\".     Arthritis     Asymptomatic bilateral carotid artery stenosis 3/10/2015    Bladder cancer (HCC)     bladder, first time 1993    CAD (coronary artery disease)     Colon polyp     Dr Fabi Suresh    Diarrhea     Difficult intubation     SMALL MOUTH AND AIRWAY    GERD (gastroesophageal reflux disease)     History of atrial fibrillation     not chronic    Hypertension     Hypothyroid     Obesity     DAISY (obstructive sleep apnea)     NO MACHINE, she has bad sinus issues and requested alternatives instead of a machine, nothing was done    PAC (premature atrial contraction)     occasionally    Prolonged emergence from general anesthesia     Stress incontinence     Wears glasses     FOR READING. Past Surgical History:   Procedure Laterality Date    BACK SURGERY  2019    stem cell injection    BLADDER SURGERY  1993 and 10/2002    for bladder cancer treatment    BREAST BIOPSY      LT. BREAST (BENIGN). , large mass removed    CHOLECYSTECTOMY      COLONOSCOPY      3-4X    CORONARY ANGIOPLASTY  2014    3 stents inserted via heart cath    CYST REMOVAL Right     3RD FINGER.    CYSTOSCOPY      Dr Erich Leventhal  [id number]    TURB-bladder tumors removed    CYSTOSCOPY  2016    fulgeration with biopsies    CYSTOSCOPY N/A 2020    CYSTO BOTOX INJECTION 100UNITS performed by Lizeth Mcintosh MD at [facility] 2020    CYSTOSCOPY INJECTION BOTOX 100 UNITS performed by Lizeth Mcintosh MD at [address] [place] [address]. EYES.    HEMORRHOID SURGERY      HYSTERECTOMY, TOTAL ABDOMINAL      unsure if ovaries remain    KNEE SURGERY Bilateral 2020    stem cell injections    OTHER SURGICAL HISTORY      abscess on tailbone    TONSILLECTOMY         Family History   Problem Relation Age of Onset    Cancer Mother         pancreatic age 80    Other Mother         Vascular disease    Cancer Father         bladder age 80    Heart Disease Maternal Grandmother     Heart Disease Maternal Grandfather     Heart Disease Paternal Grandfather     Cancer Paternal Aunt         ovarian       Social History     Tobacco Use    Smoking status: Former Smoker     Packs/day: 1.50     Years: 30.00     Pack years: 45.00     Types: Cigarettes     Quit date: 1992     Years since quittin.2    Smokeless tobacco: Never Used   Substance Use Topics    Alcohol use: Yes     Comment: COUPLE DRINKS PER WEEK.       Current Outpatient Medications   Medication Sig Dispense Refill    losartan (COZAAR) 100 MG tablet TAKE ONE TABLET BY MOUTH DAILY 90 tablet 0    levothyroxine (SYNTHROID) 75 MCG tablet TAKE ONE TABLET BY MOUTH DAILY 60 tablet 0    metoprolol tartrate (LOPRESSOR) 25 MG tablet TAKE ONE TABLET BY MOUTH TWICE A  tablet 2    levothyroxine (SYNTHROID) 50 MCG tablet TAKE ONE TABLET BY MOUTH DAILY 90 tablet 0    vitamin D (ERGOCALCIFEROL) 1.25 MG (88624 UT) CAPS capsule Take 1 capsule by mouth once a week 12 capsule 1    furosemide (LASIX) 20 MG tablet TAKE ONE TABLET BY MOUTH DAILY AS NEEDED FOR PEDAL EDEMA 60 tablet 0    pantoprazole (PROTONIX) 40 MG tablet Take 40 mg by mouth daily       zolpidem (AMBIEN) 5 MG tablet Take by mouth nightly as needed.  clotrimazole-betamethasone (LOTRISONE) 1-0.05 % cream Apply externally to affected area twice daily prn 45 g 1    aspirin 81 MG EC tablet Take 81 mg by mouth daily      nitroGLYCERIN (NITROSTAT) 0.4 MG SL tablet Place 0.4 mg under the tongue every 5 minutes as needed for Chest pain.  Multiple Vitamins-Minerals (THERAPEUTIC MULTIVITAMIN-MINERALS) tablet Take 1 tablet by mouth daily. No current facility-administered medications for this visit. Allergies   Allergen Reactions    Atorvastatin Other (See Comments)     diarrhea    Other Other (See Comments)     Passed out and because of multiple issues with this combo drug, Penstrept was taken off market in 1960s, It contained Penicillin and Streptomycin in one pill. Patient states she has taken penicillin and/or streptomycin individually without problems.     Tobramycin Swelling    Molds & Smuts Other (See Comments)     coughing       Health Maintenance   Topic Date Due    Hepatitis C screen  Never done    COVID-19 Vaccine (1) Never done    Shingles Vaccine (1 of 2) Never done    DTaP/Tdap/Td vaccine (1 - Tdap) 09/23/2021 (Originally 4/7/1962)    Flu vaccine (1) 09/23/2021 (Originally 9/1/2020)    Pneumococcal 65+ years Vaccine (1 of 1 - PPSV23) 09/23/2021 (Originally 4/7/2008)    Annual Wellness Visit (AWV)  09/24/2021    Potassium monitoring  11/06/2021    Creatinine monitoring  11/06/2021    TSH testing  12/14/2021    DEXA (modify frequency per FRAX score)  Addressed    Hepatitis A vaccine  Aged Out    Hepatitis B vaccine  Aged Out    Hib vaccine  Aged Out    Meningococcal (ACWY) vaccine  Aged Out       Subjective:      Review of Systems   Constitutional: Negative for activity change, appetite change, chills, fatigue and fever. HENT: Negative for congestion, ear pain, hearing loss, nosebleeds, sinus pressure, sinus pain and sneezing. Eyes: Negative for visual disturbance. Respiratory: Negative for cough, shortness of breath and wheezing. Cardiovascular: Negative for chest pain and palpitations. Gastrointestinal: Negative for abdominal distention, abdominal pain, constipation, diarrhea, nausea and vomiting. Endocrine: Negative for cold intolerance, heat intolerance, polydipsia, polyphagia and polyuria. Genitourinary: Negative for difficulty urinating, menstrual problem, vaginal bleeding and vaginal discharge. Musculoskeletal: Negative for back pain and joint swelling. Skin: Negative for rash. Neurological: Negative for numbness and headaches. Psychiatric/Behavioral: Positive for sleep disturbance. The patient is not nervous/anxious. All other systems reviewed and are negative. Objective:     Physical Exam  Vitals signs and nursing note reviewed. Constitutional:       General: She is not in acute distress. Appearance: She is well-developed. She is not diaphoretic. HENT:      Head: Normocephalic and atraumatic. Right Ear: Hearing normal.      Left Ear: Hearing normal.      Mouth/Throat:      Pharynx: Uvula midline. Eyes:      General: No scleral icterus. Conjunctiva/sclera: Conjunctivae normal.      Pupils: Pupils are equal, round, and reactive to light. Neck:      Musculoskeletal: Full passive range of motion without pain, normal range of motion and neck supple. Thyroid: No thyroid mass or thyromegaly. Vascular: No JVD.       Trachea: Phonation normal.   Cardiovascular:      Rate and Rhythm: Normal rate and regular rhythm. Pulses: No decreased pulses. Carotid pulses are 2+ on the right side and 2+ on the left side. Radial pulses are 2+ on the right side and 2+ on the left side. Heart sounds: Normal heart sounds. No murmur. Pulmonary:      Effort: Pulmonary effort is normal. No accessory muscle usage or respiratory distress. Breath sounds: Normal breath sounds. No wheezing or rales. Abdominal:      General: Bowel sounds are normal. There is no distension. Palpations: Abdomen is soft. Tenderness: There is no abdominal tenderness. Musculoskeletal: Normal range of motion. General: No deformity. Lymphadenopathy:      Cervical: No cervical adenopathy. Skin:     General: Skin is warm. Capillary Refill: Capillary refill takes less than 2 seconds. Findings: No rash. Neurological:      Mental Status: She is alert and oriented to person, place, and time. Deep Tendon Reflexes: Reflexes normal.   Psychiatric:         Behavior: Behavior normal.       /82 (Site: Left Lower Arm, Position: Sitting, Cuff Size: Large Adult)   Pulse 88   Resp 16   Ht 5' 4\" (1.626 m)   Wt 243 lb 9.6 oz (110.5 kg)   SpO2 97%   Breastfeeding No   BMI 41.81 kg/m²     Assessment:          1. Essential hypertension  Well-controlled    2. DAISY (obstructive sleep apnea)  On CPAP    3. DDD (degenerative disc disease), lumbar  Follow-up with orthopedic surgery    4. Helton's esophagus without dysplasia  Protonix    5. Hypothyroidism, unspecified type  Synthroid    6. Vitamin D deficiency  Vitamin D supplements weekly            Diagnosis Orders   1. Essential hypertension     2. DAISY (obstructive sleep apnea)     3. DDD (degenerative disc disease), lumbar     4. Helton's esophagus without dysplasia     5. Hypothyroidism, unspecified type     6.  Vitamin D deficiency             Plan:      No follow-ups on file.    No orders of the defined types were placed in this encounter. No orders of the defined types were placed in this encounter. Patient given educational materials - see patient instructions. Discussed use, benefit, and side effects of prescribedmedications. All patient questions answered. Pt voiced understanding. Reviewed health maintenance. Instructed to continue current medications, diet and exercise. Patient agreed with treatment plan. Follow up as directed. I spent a total of 25 minutes face to face with this patient. Over 50% of that time was spent on counseling and care coordination. Please see assessment and plan for details. Electronically signed by Gisselle Mccloud MD on 3/18/2021 at 9:54 AM      Please note that this chart was generated using voice recognition Dragon dictation software. Although every effort was made to ensure the accuracy of this automatedtranscription, some errors in transcription may have occurred.

## 2021-04-13 ENCOUNTER — TELEPHONE (OUTPATIENT)
Dept: PRIMARY CARE CLINIC | Age: 76
End: 2021-04-13

## 2021-04-13 NOTE — LETTER
Butch, [address] [facility].  [phone]      4/13/2021      [facility] Parents  [address] [place] [address]      Dear Priscilla Polanco      We do apologize for the inconvenience, Walter Borges MD will not be in the office on the day of your scheduled appointment as Walter Borges MD is transitioning out of the practice. We tried to contact you via telephone but were unsuccessful. At this time, our staff has cancelled your appointment and we ask that you please call our office as soon as possible so that we may assist you in scheduling with another provider. We recognize that everyone's time is valuable and we sincerely apologize for the inconvenience.         Thank You for your understanding      [facility]

## 2021-04-16 DIAGNOSIS — E03.9 ACQUIRED HYPOTHYROIDISM: ICD-10-CM

## 2021-04-16 RX ORDER — LEVOTHYROXINE SODIUM 0.07 MG/1
TABLET ORAL
Qty: 60 TABLET | Refills: 0 | Status: SHIPPED | OUTPATIENT
Start: 2021-04-16 | End: 2021-07-01 | Stop reason: SDUPTHER

## 2021-05-05 ENCOUNTER — OFFICE VISIT (OUTPATIENT)
Dept: PRIMARY CARE CLINIC | Age: 76
End: 2021-05-05
Payer: MEDICARE

## 2021-05-05 ENCOUNTER — HOSPITAL ENCOUNTER (OUTPATIENT)
Age: 76
Setting detail: SPECIMEN
Discharge: HOME | End: 2021-05-05
Payer: MEDICARE

## 2021-05-05 ENCOUNTER — NURSE TRIAGE (OUTPATIENT)
Dept: OTHER | Facility: CLINIC | Age: 76
End: 2021-05-05

## 2021-05-05 VITALS
DIASTOLIC BLOOD PRESSURE: 80 MMHG | OXYGEN SATURATION: 97 % | WEIGHT: 246 LBS | HEART RATE: 69 BPM | SYSTOLIC BLOOD PRESSURE: 138 MMHG | RESPIRATION RATE: 18 BRPM | BODY MASS INDEX: 42.23 KG/M2

## 2021-05-05 DIAGNOSIS — E66.01 CLASS 3 SEVERE OBESITY DUE TO EXCESS CALORIES WITH SERIOUS COMORBIDITY AND BODY MASS INDEX (BMI) OF 40.0 TO 44.9 IN ADULT (HCC): ICD-10-CM

## 2021-05-05 DIAGNOSIS — C67.9 MALIGNANT NEOPLASM OF URINARY BLADDER, UNSPECIFIED SITE (HCC): ICD-10-CM

## 2021-05-05 DIAGNOSIS — I25.10 CAD IN NATIVE ARTERY: ICD-10-CM

## 2021-05-05 DIAGNOSIS — R06.02 SOB (SHORTNESS OF BREATH) ON EXERTION: Primary | ICD-10-CM

## 2021-05-05 LAB
TROPONIN INTERP: NORMAL
TROPONIN T: NORMAL NG/ML
TROPONIN, HIGH SENSITIVITY: 10 NG/L (ref 0–14)

## 2021-05-05 PROCEDURE — G8417 CALC BMI ABV UP PARAM F/U: HCPCS | Performed by: INTERNAL MEDICINE

## 2021-05-05 PROCEDURE — 4040F PNEUMOC VAC/ADMIN/RCVD: CPT | Performed by: INTERNAL MEDICINE

## 2021-05-05 PROCEDURE — 1036F TOBACCO NON-USER: CPT | Performed by: INTERNAL MEDICINE

## 2021-05-05 PROCEDURE — 1090F PRES/ABSN URINE INCON ASSESS: CPT | Performed by: INTERNAL MEDICINE

## 2021-05-05 PROCEDURE — G8400 PT W/DXA NO RESULTS DOC: HCPCS | Performed by: INTERNAL MEDICINE

## 2021-05-05 PROCEDURE — 99213 OFFICE O/P EST LOW 20 MIN: CPT | Performed by: INTERNAL MEDICINE

## 2021-05-05 PROCEDURE — 1123F ACP DISCUSS/DSCN MKR DOCD: CPT | Performed by: INTERNAL MEDICINE

## 2021-05-05 PROCEDURE — G8427 DOCREV CUR MEDS BY ELIG CLIN: HCPCS | Performed by: INTERNAL MEDICINE

## 2021-05-05 RX ORDER — ROSUVASTATIN CALCIUM 20 MG/1
TABLET, COATED ORAL
COMMUNITY
Start: 2021-04-12

## 2021-05-05 NOTE — TELEPHONE ENCOUNTER
exposures)        No    Protocols used: BREATHING DIFFICULTY-ADULT-OH    Received call from Yue at pre-service center [facility]) [place] with [employer] Complaint. Brief description of triage: SOB, breaks out in sweat if increased activity, denies change in vitals    Triage indicates for patient to be to be seen today    Care advice provided, patient verbalizes understanding; denies any other questions or concerns; instructed to call back for any new or worsening symptoms. Writer provided warm transfer to Currie at [facility] for appointment scheduling. Attention Provider: Thank you for allowing me to participate in the care of your patient. The patient was connected to triage in response to information provided to the [facility]. Please do not respond through this encounter as the response is not directed to a shared pool.

## 2021-05-09 ASSESSMENT — ENCOUNTER SYMPTOMS
VOMITING: 0
SHORTNESS OF BREATH: 1
SINUS PRESSURE: 0
ABDOMINAL DISTENTION: 0
SINUS PAIN: 0
COUGH: 0
BACK PAIN: 0
ABDOMINAL PAIN: 0
NAUSEA: 0
WHEEZING: 0
DIARRHEA: 0
CONSTIPATION: 0

## 2021-05-10 NOTE — PROGRESS NOTES
[address] [facility] PRIMARY CARE  [address]   [address]  [address] [address]  Dept: [phone]  Dept Fax: [phone]    Yair Pena is a 64 y.o. female who presents today for her medical conditions/complaints as noted below. Chief Complaint   Patient presents with    Other     breaking out into sweat and SOB       HPI:     This is a 64-year-old female who is here for complaints of shortness of breath. She has been having increasing sweating when she is short of breath. She has coronary artery disease and does follow-up with cardiology. Discussed in detail that she needs to get in to use follow-up with them ASAP      Hemoglobin A1C (%)   Date Value   12/14/2020 5.7             ( goal A1C is < 7)   No results found for: LABMICR  LDL Cholesterol (mg/dL)   Date Value   12/14/2020 117     LDL Calculated (mg/dL)   Date Value   01/13/2017 129   10/06/2015 83   08/20/2014 66       (goal LDL is <100)   BUN (mg/dL)   Date Value   11/06/2020 16     BP Readings from Last 3 Encounters:   05/05/21 138/80   03/18/21 130/82   12/16/20 122/86          (goal 120/80)    Past Medical History:   Diagnosis Date    AK (actinic keratosis)     from sun exposure    Anesthesia     HARD TIME WAKING UP \"SOMETIMES\".     Arthritis     Asymptomatic bilateral carotid artery stenosis 3/10/2015    Bladder cancer (HCC)     bladder, first time 1993    CAD (coronary artery disease)     Colon polyp     Dr Gonzalez Coby    Diarrhea     Difficult intubation     SMALL MOUTH AND AIRWAY    GERD (gastroesophageal reflux disease)     History of atrial fibrillation     not chronic    Hypertension     Hypothyroid     Obesity     DAISY (obstructive sleep apnea)     NO MACHINE, she has bad sinus issues and requested alternatives instead of a machine, nothing was done    PAC (premature atrial contraction)     occasionally    Prolonged emergence from general anesthesia     Stress incontinence     Wears glasses     FOR READING. Past Surgical History:   Procedure Laterality Date    BACK SURGERY  2019    stem cell injection    BLADDER SURGERY  1993 and 10/2002    for bladder cancer treatment    BREAST BIOPSY      LT. BREAST (BENIGN). , large mass removed    CHOLECYSTECTOMY      COLONOSCOPY      3-4X    CORONARY ANGIOPLASTY  2014    3 stents inserted via heart cath    CYST REMOVAL Right     3RD FINGER.    CYSTOSCOPY      Dr Alis Bain  [id number]    TURB-bladder tumors removed    CYSTOSCOPY  2016    fulgeration with biopsies    CYSTOSCOPY N/A 2020    CYSTO BOTOX INJECTION 100UNITS performed by John Odell MD at [facility] 2020    CYSTOSCOPY INJECTION BOTOX 100 UNITS performed by John Odell MD at [address] [place] [address]. EYES.    HEMORRHOID SURGERY      HYSTERECTOMY, TOTAL ABDOMINAL      unsure if ovaries remain    KNEE SURGERY Bilateral 2020    stem cell injections    OTHER SURGICAL HISTORY      abscess on tailbone    TONSILLECTOMY         Family History   Problem Relation Age of Onset    Cancer Mother         pancreatic age 80    Other Mother         Vascular disease    Cancer Father         bladder age 80    Heart Disease Maternal Grandmother     Heart Disease Maternal Grandfather     Heart Disease Paternal Grandfather     Cancer Paternal Aunt         ovarian       Social History     Tobacco Use    Smoking status: Former Smoker     Packs/day: 1.50     Years: 30.00     Pack years: 45.00     Types: Cigarettes     Quit date: 1992     Years since quittin.3    Smokeless tobacco: Never Used   Substance Use Topics    Alcohol use: Yes     Comment: COUPLE DRINKS PER WEEK.       Current Outpatient Medications   Medication Sig Dispense Refill    levothyroxine (SYNTHROID) 75 MCG tablet TAKE ONE TABLET BY MOUTH DAILY 60 tablet 0    losartan (COZAAR) 100 MG tablet TAKE ONE TABLET BY MOUTH DAILY 90 tablet 0    metoprolol tartrate (LOPRESSOR) 25 MG tablet TAKE ONE TABLET BY MOUTH TWICE A  tablet 2    levothyroxine (SYNTHROID) 50 MCG tablet TAKE ONE TABLET BY MOUTH DAILY 90 tablet 0    vitamin D (ERGOCALCIFEROL) 1.25 MG (05787 UT) CAPS capsule Take 1 capsule by mouth once a week 12 capsule 1    furosemide (LASIX) 20 MG tablet TAKE ONE TABLET BY MOUTH DAILY AS NEEDED FOR PEDAL EDEMA 60 tablet 0    pantoprazole (PROTONIX) 40 MG tablet Take 40 mg by mouth daily       zolpidem (AMBIEN) 5 MG tablet Take by mouth nightly as needed.  clotrimazole-betamethasone (LOTRISONE) 1-0.05 % cream Apply externally to affected area twice daily prn 45 g 1    aspirin 81 MG EC tablet Take 81 mg by mouth daily      nitroGLYCERIN (NITROSTAT) 0.4 MG SL tablet Place 0.4 mg under the tongue every 5 minutes as needed for Chest pain.  Multiple Vitamins-Minerals (THERAPEUTIC MULTIVITAMIN-MINERALS) tablet Take 1 tablet by mouth daily.  rosuvastatin (CRESTOR) 20 MG tablet        No current facility-administered medications for this visit. Allergies   Allergen Reactions    Atorvastatin Other (See Comments)     diarrhea    Other Other (See Comments)     Passed out and because of multiple issues with this combo drug, Penstrept was taken off market in 1960s, It contained Penicillin and Streptomycin in one pill. Patient states she has taken penicillin and/or streptomycin individually without problems.     Tobramycin Swelling    Molds & Smuts Other (See Comments)     coughing       Health Maintenance   Topic Date Due    Hepatitis C screen  Never done    Shingles Vaccine (1 of 2) Never done    DTaP/Tdap/Td vaccine (1 - Tdap) 09/23/2021 (Originally 4/7/1962)    Flu vaccine (Season Ended) 09/23/2021 (Originally 9/1/2021)    Pneumococcal 65+ years Vaccine (1 of 1 - PPSV23) 09/23/2021 (Originally 4/7/2008)    Annual Wellness Visit (AWV)  09/24/2021    Potassium monitoring  11/06/2021    Creatinine monitoring  11/06/2021    Lipid screen  12/14/2021    TSH testing  12/14/2021    COVID-19 Vaccine  Completed    DEXA (modify frequency per FRAX score)  Addressed    Hepatitis A vaccine  Aged Out    Hepatitis B vaccine  Aged Out    Hib vaccine  Aged Out    Meningococcal (ACWY) vaccine  Aged Out       Subjective:      Review of Systems   Constitutional: Negative for activity change, appetite change, chills, fatigue and fever. HENT: Negative for congestion, ear pain, hearing loss, nosebleeds, sinus pressure, sinus pain and sneezing. Eyes: Negative for visual disturbance. Respiratory: Positive for shortness of breath. Negative for cough and wheezing. Cardiovascular: Negative for chest pain and palpitations. Gastrointestinal: Negative for abdominal distention, abdominal pain, constipation, diarrhea, nausea and vomiting. Endocrine: Negative for cold intolerance, heat intolerance, polydipsia, polyphagia and polyuria. Genitourinary: Negative for difficulty urinating, menstrual problem, vaginal bleeding and vaginal discharge. Musculoskeletal: Negative for back pain and joint swelling. Skin: Negative for rash. Neurological: Negative for numbness and headaches. Psychiatric/Behavioral: Negative for sleep disturbance. The patient is not nervous/anxious. All other systems reviewed and are negative. Objective:     Physical Exam  Vitals signs and nursing note reviewed. Constitutional:       General: She is not in acute distress. Appearance: She is well-developed. She is not diaphoretic. HENT:      Head: Normocephalic and atraumatic. Right Ear: Hearing normal.      Left Ear: Hearing normal.      Mouth/Throat:      Pharynx: Uvula midline. Eyes:      General: No scleral icterus. Conjunctiva/sclera: Conjunctivae normal.      Pupils: Pupils are equal, round, and reactive to light.    Neck:      Musculoskeletal: Full passive range of motion without pain, normal range of motion and neck supple. Thyroid: No thyroid mass or thyromegaly. Vascular: No JVD. Trachea: Phonation normal.   Cardiovascular:      Rate and Rhythm: Normal rate and regular rhythm. Pulses: No decreased pulses. Carotid pulses are 2+ on the right side and 2+ on the left side. Radial pulses are 2+ on the right side and 2+ on the left side. Heart sounds: Normal heart sounds. No murmur. Pulmonary:      Effort: Pulmonary effort is normal. No accessory muscle usage or respiratory distress. Breath sounds: Normal breath sounds. No wheezing or rales. Abdominal:      General: Bowel sounds are normal. There is no distension. Palpations: Abdomen is soft. Tenderness: There is no abdominal tenderness. Musculoskeletal: Normal range of motion. General: No deformity. Lymphadenopathy:      Cervical: No cervical adenopathy. Skin:     General: Skin is warm. Capillary Refill: Capillary refill takes less than 2 seconds. Findings: No rash. Neurological:      Mental Status: She is alert and oriented to person, place, and time. Deep Tendon Reflexes: Reflexes normal.   Psychiatric:         Behavior: Behavior normal.       /80   Pulse 69   Resp 18   Wt 246 lb (111.6 kg)   SpO2 97%   BMI 42.23 kg/m²     Assessment:          1. SOB (shortness of breath) on exertion      2. CAD in native artery  She needs to follow-up with cardiology ASAP  - Troponin I; Future    3. Malignant neoplasm of urinary bladder, unspecified site (HCC)      4. Class 3 severe obesity due to excess calories with serious comorbidity and body mass index (BMI) of 40.0 to 44.9 in adult [facility])              Diagnosis Orders   1. SOB (shortness of breath) on exertion     2. CAD in native artery  Troponin I   3.  Malignant neoplasm of urinary bladder, unspecified site (HCC)     4. Class 3 severe obesity due to excess calories with serious comorbidity and body mass index (BMI) of 40.0 to 44.9 in adult [facility])             Plan:      No follow-ups on file. Orders Placed This Encounter   Procedures    Troponin I     Standing Status:   Future     Number of Occurrences:   1     Standing Expiration Date:   5/5/2022     No orders of the defined types were placed in this encounter. Patient given educational materials - see patient instructions. Discussed use, benefit, and side effects of prescribedmedications. All patient questions [id number]. Pt voiced understanding. Reviewed health maintenance. Instructed to continue current medications, diet and exercise. Patient agreed with treatment plan. Follow up as directed. I spent a total of 15 minutes face to face with this patient. Over 50% of that time was spent on counseling and care coordination. Please see assessment and plan for details. Electronically signed by Dami Marquez MD on 5/9/2021 at 11:18 PM      Please note that this chart was generated using voice recognition Dragon dictation software. Although every effort was made to ensure the accuracy of this automatedtranscription, some errors in transcription may have occurred.

## 2021-07-01 ENCOUNTER — TELEPHONE (OUTPATIENT)
Dept: PRIMARY CARE CLINIC | Age: 76
End: 2021-07-01

## 2021-07-01 DIAGNOSIS — E03.9 ACQUIRED HYPOTHYROIDISM: ICD-10-CM

## 2021-07-01 RX ORDER — LEVOTHYROXINE SODIUM 0.07 MG/1
TABLET ORAL
Qty: 90 TABLET | Refills: 1 | Status: SHIPPED | OUTPATIENT
Start: 2021-07-01 | End: 2021-12-28

## 2021-07-01 NOTE — TELEPHONE ENCOUNTER
----- Message from Olivier Sanders sent at 7/1/2021  9:21 AM EDT -----  Subject: Refill Request    QUESTIONS  Name of Medication? levothyroxine (SYNTHROID) 75 MCG tablet  Patient-reported dosage and instructions? Take one tablet by mouth daily   How many days do you have left? 5  Preferred Pharmacy? [address] phone number (if available)? [phone]  ---------------------------------------------------------------------------  --------------  CALL BACK INFO  What is the best way for the office to contact you? OK to leave message on   voicemail  Preferred Call Back Phone Number?  [phone]

## 2021-07-01 NOTE — TELEPHONE ENCOUNTER
----- Message from Ne Rasheed sent at 7/1/2021  9:21 AM EDT -----  Subject: Refill Request    QUESTIONS  Name of Medication? levothyroxine (SYNTHROID) 75 MCG tablet  Patient-reported dosage and instructions? Take one tablet by mouth daily   How many days do you have left? 5  Preferred Pharmacy? [address] [facility] phone number (if available)? [phone]  ---------------------------------------------------------------------------  --------------  CALL BACK INFO  What is the best way for the office to contact you? OK to leave message on   voicemail  Preferred Call Back Phone Number?  [phone]

## 2021-07-01 NOTE — TELEPHONE ENCOUNTER
Last Visit Date: 5/5/2021   Next Visit Date: 7/22/21 with [facility]

## 2021-08-26 ENCOUNTER — TELEPHONE (OUTPATIENT)
Dept: PRIMARY CARE CLINIC | Age: 76
End: 2021-08-26

## 2021-08-27 NOTE — TELEPHONE ENCOUNTER
Patient is aware that her medicare annual wellness visit will need to be separate, plus she is not eligible till 09/24/2021.

## 2021-09-01 ENCOUNTER — OFFICE VISIT (OUTPATIENT)
Dept: PRIMARY CARE CLINIC | Age: 76
End: 2021-09-01
Payer: MEDICARE

## 2021-09-01 VITALS
RESPIRATION RATE: 18 BRPM | OXYGEN SATURATION: 97 % | DIASTOLIC BLOOD PRESSURE: 74 MMHG | HEART RATE: 100 BPM | WEIGHT: 244.6 LBS | BODY MASS INDEX: 41.99 KG/M2 | SYSTOLIC BLOOD PRESSURE: 126 MMHG

## 2021-09-01 DIAGNOSIS — E55.9 VITAMIN D DEFICIENCY: ICD-10-CM

## 2021-09-01 DIAGNOSIS — E78.5 DYSLIPIDEMIA: ICD-10-CM

## 2021-09-01 DIAGNOSIS — G47.33 OSA (OBSTRUCTIVE SLEEP APNEA): ICD-10-CM

## 2021-09-01 DIAGNOSIS — M25.561 CHRONIC PAIN OF BOTH KNEES: Primary | ICD-10-CM

## 2021-09-01 DIAGNOSIS — E03.9 ACQUIRED HYPOTHYROIDISM: ICD-10-CM

## 2021-09-01 DIAGNOSIS — R60.0 PEDAL EDEMA: ICD-10-CM

## 2021-09-01 DIAGNOSIS — E66.01 CLASS 3 SEVERE OBESITY DUE TO EXCESS CALORIES WITHOUT SERIOUS COMORBIDITY IN ADULT, UNSPECIFIED BMI (HCC): ICD-10-CM

## 2021-09-01 DIAGNOSIS — M25.562 CHRONIC PAIN OF BOTH KNEES: Primary | ICD-10-CM

## 2021-09-01 DIAGNOSIS — I10 ESSENTIAL HYPERTENSION: ICD-10-CM

## 2021-09-01 DIAGNOSIS — M51.36 DDD (DEGENERATIVE DISC DISEASE), LUMBAR: ICD-10-CM

## 2021-09-01 DIAGNOSIS — K21.9 GASTROESOPHAGEAL REFLUX DISEASE WITHOUT ESOPHAGITIS: ICD-10-CM

## 2021-09-01 DIAGNOSIS — G89.29 CHRONIC PAIN OF BOTH KNEES: Primary | ICD-10-CM

## 2021-09-01 PROBLEM — K13.0: Status: RESOLVED | Noted: 2020-07-21 | Resolved: 2021-09-01

## 2021-09-01 PROBLEM — E63.9: Status: RESOLVED | Noted: 2020-07-21 | Resolved: 2021-09-01

## 2021-09-01 PROCEDURE — 1036F TOBACCO NON-USER: CPT | Performed by: STUDENT IN AN ORGANIZED HEALTH CARE EDUCATION/TRAINING PROGRAM

## 2021-09-01 PROCEDURE — 1090F PRES/ABSN URINE INCON ASSESS: CPT | Performed by: STUDENT IN AN ORGANIZED HEALTH CARE EDUCATION/TRAINING PROGRAM

## 2021-09-01 PROCEDURE — 4040F PNEUMOC VAC/ADMIN/RCVD: CPT | Performed by: STUDENT IN AN ORGANIZED HEALTH CARE EDUCATION/TRAINING PROGRAM

## 2021-09-01 PROCEDURE — 1123F ACP DISCUSS/DSCN MKR DOCD: CPT | Performed by: STUDENT IN AN ORGANIZED HEALTH CARE EDUCATION/TRAINING PROGRAM

## 2021-09-01 PROCEDURE — G8427 DOCREV CUR MEDS BY ELIG CLIN: HCPCS | Performed by: STUDENT IN AN ORGANIZED HEALTH CARE EDUCATION/TRAINING PROGRAM

## 2021-09-01 PROCEDURE — 99213 OFFICE O/P EST LOW 20 MIN: CPT | Performed by: STUDENT IN AN ORGANIZED HEALTH CARE EDUCATION/TRAINING PROGRAM

## 2021-09-01 PROCEDURE — G8417 CALC BMI ABV UP PARAM F/U: HCPCS | Performed by: STUDENT IN AN ORGANIZED HEALTH CARE EDUCATION/TRAINING PROGRAM

## 2021-09-01 PROCEDURE — G8400 PT W/DXA NO RESULTS DOC: HCPCS | Performed by: STUDENT IN AN ORGANIZED HEALTH CARE EDUCATION/TRAINING PROGRAM

## 2021-09-01 RX ORDER — MELATONIN
1000 DAILY
Qty: 90 TABLET | Refills: 1 | Status: SHIPPED | OUTPATIENT
Start: 2021-09-01 | End: 2021-09-27

## 2021-09-01 SDOH — ECONOMIC STABILITY: FOOD INSECURITY: WITHIN THE PAST 12 MONTHS, YOU WORRIED THAT YOUR FOOD WOULD RUN OUT BEFORE YOU GOT MONEY TO BUY MORE.: NEVER TRUE

## 2021-09-01 SDOH — ECONOMIC STABILITY: FOOD INSECURITY: WITHIN THE PAST 12 MONTHS, THE FOOD YOU BOUGHT JUST DIDN'T LAST AND YOU DIDN'T HAVE MONEY TO GET MORE.: NEVER TRUE

## 2021-09-01 ASSESSMENT — SOCIAL DETERMINANTS OF HEALTH (SDOH): HOW HARD IS IT FOR YOU TO PAY FOR THE VERY BASICS LIKE FOOD, HOUSING, MEDICAL CARE, AND HEATING?: NOT HARD AT ALL

## 2021-09-01 ASSESSMENT — ENCOUNTER SYMPTOMS
ABDOMINAL DISTENTION: 0
EYE ITCHING: 0
EYE DISCHARGE: 0
SHORTNESS OF BREATH: 0
RHINORRHEA: 0
WHEEZING: 0
BACK PAIN: 0
COUGH: 0
ABDOMINAL PAIN: 0

## 2021-09-01 NOTE — PROGRESS NOTES
[facility] PRIMARY CARE  [address]   [address]  [address] [address]  Dept: [phone]  Dept Fax: [phone]    Miguel Carranza is a 64 y.o. female who presents today for her medical conditions/complaints as noted below. Chief Complaint   Patient presents with   Kathryn Laguerre Doctor    Weight Management    Knee Pain       HPI:     64 y. o.F came to office today for regular follow-up of hypertension, hypothyroidism, dyslipidemia and bilateral knee osteoarthritis. Her main concern today was chronic bilateral knee pain and lower back pain. She has tried couple of procedures in the past including stem cell transplant, cortisol injections and physical therapy. These procedures helped her for some time but again she feels her bilateral knee pain and lower back pain has come back and is worse than before. She denies any associated numbness tingling or weakness. She follows up with physical therapy as well which has made her symptoms worse per patient. Denied any other current complaints. Vital signs stable    Health maintenance due for annual wellness visit    Advised lifestyle changes. Medications reviewed and refilled as appropriate, problem list updated. All concerns discussed in details and all questions answered to patient satisfaction. Hemoglobin A1C (%)   Date Value   12/14/2020 5.7             ( goal A1C is < 7)   No results found for: LABMICR  LDL Cholesterol (mg/dL)   Date Value   12/14/2020 117     LDL Calculated (mg/dL)   Date Value   01/13/2017 129   10/06/2015 83   08/20/2014 66       (goal LDL is <100)   BUN (mg/dL)   Date Value   11/06/2020 16     BP Readings from Last 3 Encounters:   09/01/21 126/74   05/05/21 138/80   03/18/21 130/82          (goal 120/80)    Past Medical History:   Diagnosis Date    AK (actinic keratosis)     from sun exposure    Anesthesia     HARD TIME WAKING UP \"SOMETIMES\".     Arthritis     Asymptomatic bilateral carotid artery stenosis 3/10/2015    Bladder cancer (HCC)     bladder, first time 1993    CAD (coronary artery disease)     Colon polyp     Dr Middleton Barrier    Diarrhea     Difficult intubation     SMALL MOUTH AND AIRWAY    GERD (gastroesophageal reflux disease)     History of atrial fibrillation     not chronic    Hypertension     Hypothyroid     Obesity     DAISY (obstructive sleep apnea)     NO MACHINE, she has bad sinus issues and requested alternatives instead of a machine, nothing was done    PAC (premature atrial contraction)     occasionally    Prolonged emergence from general anesthesia     Stress incontinence     Wears glasses     FOR READING. Past Surgical History:   Procedure Laterality Date    BACK SURGERY  2019    stem cell injection    BLADDER SURGERY  8/1993 and 10/2002    for bladder cancer treatment    BREAST BIOPSY      LT. BREAST (BENIGN). , large mass removed    CHOLECYSTECTOMY      COLONOSCOPY      3-4X    CORONARY ANGIOPLASTY  07/03/2014    3 stents inserted via heart cath    CYST REMOVAL Right     3RD FINGER.    CYSTOSCOPY  2014    Dr Lan Crocker  [birth year]    TURB-bladder tumors removed    CYSTOSCOPY  11/18/2016    fulgeration with biopsies    CYSTOSCOPY N/A 1/21/2020    CYSTO BOTOX INJECTION 100UNITS performed by Consuelo Gaona MD at [address] 11/17/2020    CYSTOSCOPY INJECTION BOTOX 100 UNITS performed by Consuelo Gaona MD at [facility]  EYES.    HEMORRHOID SURGERY      HYSTERECTOMY, TOTAL ABDOMINAL  1999    unsure if ovaries remain    KNEE SURGERY Bilateral 07/01/2020    stem cell injections    OTHER SURGICAL HISTORY      abscess on tailbone    TONSILLECTOMY         Family History   Problem Relation Age of Onset   Conchis Pleitez Cancer Mother         pancreatic age 80    Other Mother         Vascular disease    Cancer Father         bladder age 80    Heart Disease Maternal Grandmother     Heart Disease Maternal Grandfather     Heart Disease Paternal Grandfather     Cancer Paternal Aunt         ovarian       Social History     Tobacco Use    Smoking status: Former Smoker     Packs/day: 1.50     Years: 30.00     Pack years: 45.00     Types: Cigarettes     Quit date: 1992     Years since quittin.6    Smokeless tobacco: Never Used   Substance Use Topics    Alcohol use: Yes     Comment: COUPLE DRINKS PER WEEK. Current Outpatient Medications   Medication Sig Dispense Refill    vitamin D3 (CHOLECALCIFEROL) 25 MCG (1000 UT) TABS tablet Take 1 tablet by mouth daily 90 tablet 1    levothyroxine (SYNTHROID) 75 MCG tablet TAKE ONE TABLET BY MOUTH DAILY 90 tablet 1    rosuvastatin (CRESTOR) 20 MG tablet       losartan (COZAAR) 100 MG tablet TAKE ONE TABLET BY MOUTH DAILY 90 tablet 0    metoprolol tartrate (LOPRESSOR) 25 MG tablet TAKE ONE TABLET BY MOUTH TWICE A  tablet 2    furosemide (LASIX) 20 MG tablet TAKE ONE TABLET BY MOUTH DAILY AS NEEDED FOR PEDAL EDEMA 60 tablet 0    pantoprazole (PROTONIX) 40 MG tablet Take 40 mg by mouth daily       zolpidem (AMBIEN) 5 MG tablet Take by mouth nightly as needed.  clotrimazole-betamethasone (LOTRISONE) 1-0.05 % cream Apply externally to affected area twice daily prn 45 g 1    aspirin 81 MG EC tablet Take 81 mg by mouth daily      nitroGLYCERIN (NITROSTAT) 0.4 MG SL tablet Place 0.4 mg under the tongue every 5 minutes as needed for Chest pain.  Multiple Vitamins-Minerals (THERAPEUTIC MULTIVITAMIN-MINERALS) tablet Take 1 tablet by mouth daily. No current facility-administered medications for this visit. Allergies   Allergen Reactions    Atorvastatin Other (See Comments)     diarrhea    Other Other (See Comments)     Passed out and because of multiple issues with this combo drug, Penstrept was taken off market in 1960s, It contained Penicillin and Streptomycin in one pill.   Patient states she has taken penicillin and/or streptomycin individually without problems.  Tobramycin Swelling    Molds & Smuts Other (See Comments)     coughing       Health Maintenance   Topic Date Due    Hepatitis C screen  Never done    Shingles Vaccine (1 of 2) Never done    Flu vaccine (1) Never done   [employer] Visit (AWV)  09/24/2021    DTaP/Tdap/Td vaccine (1 - Tdap) 09/23/2021 (Originally 4/7/1964)    Pneumococcal 65+ years Vaccine (1 of 1 - PPSV23) 09/23/2021 (Originally 4/7/2008)    Potassium monitoring  11/06/2021    Creatinine monitoring  11/06/2021    Lipid screen  12/14/2021    TSH testing  12/14/2021    COVID-19 Vaccine  Completed    DEXA (modify frequency per FRAX score)  Addressed    Hepatitis A vaccine  Aged Out    Hepatitis B vaccine  Aged Out    Hib vaccine  Aged Out    Meningococcal (ACWY) vaccine  Aged Out       Subjective:      Review of Systems   Constitutional: Negative for chills, fatigue and fever. HENT: Negative for congestion, hearing loss and rhinorrhea. Eyes: Negative for discharge and itching. Respiratory: Negative for cough, shortness of breath and wheezing. Cardiovascular: Negative for chest pain, palpitations and leg swelling. Gastrointestinal: Negative for abdominal distention and abdominal pain. Endocrine: Negative for polydipsia and polyphagia. Genitourinary: Negative for difficulty urinating and dysuria. Musculoskeletal: Positive for arthralgias. Negative for back pain. Bilateral knee pain. Skin: Negative for rash and wound. Neurological: Negative for dizziness, seizures, light-headedness and headaches. Psychiatric/Behavioral: Negative for agitation and behavioral problems. Objective:     Physical Exam  Constitutional:       Appearance: She is well-developed. HENT:      Head: Normocephalic and atraumatic. Eyes:      Conjunctiva/sclera: Conjunctivae normal.      Pupils: Pupils are equal, round, and reactive to light.    Neck:      Thyroid: No thyromegaly. Vascular: No JVD. Cardiovascular:      Rate and Rhythm: Normal rate and regular rhythm. Heart sounds: Normal heart sounds. No murmur heard. Pulmonary:      Effort: Pulmonary effort is normal.      Breath sounds: Normal breath sounds. No wheezing. Abdominal:      General: Bowel sounds are normal. There is no distension. Palpations: Abdomen is soft. Tenderness: There is no abdominal tenderness. There is no rebound. Musculoskeletal:         General: Tenderness present. Normal range of motion. Cervical back: Normal range of motion and neck supple. Comments: Bilateral knee tenderness and lower back tenderness present. Neurological:      Mental Status: She is alert and oriented to person, place, and time. Cranial Nerves: No cranial nerve deficit. Psychiatric:         Behavior: Behavior normal.       /74   Pulse 100   Resp 18   Wt 244 lb 9.6 oz (110.9 kg)   SpO2 97%   BMI 41.99 kg/m²     Assessment:          1. Chronic pain of both knees    - Rox Silva DO, Orthopedic Surgery, [place]    2. Acquired hypothyroidism  Continued on Synthroid. - TSH With Reflex Ft4; Future    3. Vitamin D deficiency    - Vitamin D 25 Hydroxy; Future  - vitamin D3 (CHOLECALCIFEROL) 25 MCG (1000 UT) TABS tablet; Take 1 tablet by mouth daily  Dispense: 90 tablet; Refill: 1    4. Pedal edema  On Lasix 20 as needed  - Compression Stocking    5. Essential hypertension  On Cozaar, Lopressor and Lasix 20 as needed    6. DAISY (obstructive sleep apnea)  Stable    7. Gastroesophageal reflux disease without esophagitis  On Protonix 40 daily    8. DDD (degenerative disc disease), lumbar  Referred to Ortho. Follows up with physical therapy    9. Class 3 severe obesity due to excess calories without serious comorbidity in adult, unspecified BMI ([facility] [address]  Advised weight loss    10. Dyslipidemia  Lifestyle changes. Continued on Crestor 20            Diagnosis Orders   1. Chronic pain of both knees  [facility] - Newton Marcano DO, Orthopedic Surgery, [place]   2. Acquired hypothyroidism  TSH With Reflex Ft4   3. Vitamin D deficiency  Vitamin D 25 Hydroxy    vitamin D3 (CHOLECALCIFEROL) 25 MCG (1000 UT) TABS tablet   4. Pedal edema  Compression Stocking   5. Essential hypertension     6. DAISY (obstructive sleep apnea)     7. Gastroesophageal reflux disease without esophagitis     8. DDD (degenerative disc disease), lumbar     9. Class 3 severe obesity due to excess calories without serious comorbidity in adult, unspecified BMI ([address]     10. Dyslipidemia             Plan:      Return in about 26 days (around 9/27/2021) for AWV. Orders Placed This Encounter   Procedures    TSH With Reflex Ft4     Standing Status:   Future     Standing Expiration Date:   9/1/2022    Vitamin D 25 Hydroxy     Standing Status:   Future     Standing Expiration Date:   9/1/2022   [facility] - Newton Marcano DO, Orthopedic Surgery, [place]     Referral Priority:   Routine     Referral Type:   Eval and Treat     Referral Reason:   Specialty Services Required     Referred to Provider:   Ellie Fitzgerald DO     Requested Specialty:   Orthopedic Surgery     Number of Visits Requested:   1    Compression Stocking     B/l knee high  For b/l pedal edema     Orders Placed This Encounter   Medications    vitamin D3 (CHOLECALCIFEROL) 25 MCG (1000 UT) TABS tablet     Sig: Take 1 tablet by mouth daily     Dispense:  90 tablet     Refill:  1         Patient given educational materials - see patient instructions. Discussed use, benefit, and side effects of prescribedmedications. All patient questions answered. Pt voiced understanding. Reviewed health maintenance. Instructed to continue current medications, diet and exercise. Patient agreed with treatment plan. Follow up as directed. I spent a total of 20-29 minutes face to face with this patient.   Over 50% of that time was spent on counseling and care coordination. Please see assessment and plan for details. Electronically signed by   Daniela Carrillo MD on 9/1/2021 at 10:31 AM  [facility]. Please note that this chart was generated using voice recognition Dragon dictation software. Although every effort was made to ensure the accuracy of this automatedtranscription, some errors in transcription may have occurred.

## 2021-09-14 ENCOUNTER — TELEPHONE (OUTPATIENT)
Dept: UROLOGY | Age: 76
End: 2021-09-14

## 2021-09-14 NOTE — TELEPHONE ENCOUNTER
Left patient a voicemail to call to schedule OV with  [facility], [address]. Per follow from 11/17/20 patient needs OV w/ PVR.

## 2021-09-22 ENCOUNTER — OFFICE VISIT (OUTPATIENT)
Dept: ORTHOPEDIC SURGERY | Age: 76
End: 2021-09-22
Payer: MEDICARE

## 2021-09-22 VITALS — HEIGHT: 64 IN | WEIGHT: 245 LBS | BODY MASS INDEX: 41.83 KG/M2

## 2021-09-22 DIAGNOSIS — M17.0 ARTHRITIS OF BOTH KNEES: Primary | ICD-10-CM

## 2021-09-22 PROCEDURE — 1090F PRES/ABSN URINE INCON ASSESS: CPT | Performed by: ORTHOPAEDIC SURGERY

## 2021-09-22 PROCEDURE — G8417 CALC BMI ABV UP PARAM F/U: HCPCS | Performed by: ORTHOPAEDIC SURGERY

## 2021-09-22 PROCEDURE — 99204 OFFICE O/P NEW MOD 45 MIN: CPT | Performed by: ORTHOPAEDIC SURGERY

## 2021-09-22 PROCEDURE — G8427 DOCREV CUR MEDS BY ELIG CLIN: HCPCS | Performed by: ORTHOPAEDIC SURGERY

## 2021-09-22 ASSESSMENT — ENCOUNTER SYMPTOMS
DIARRHEA: 0
NAUSEA: 0
CONSTIPATION: 0
COUGH: 0

## 2021-09-22 NOTE — PROGRESS NOTES
[facility] ORTHOPEDICS AND SPORTS MEDICINE  [address] [facility]  [address] [facility] [address]  Dept: [phone]    Ambulatory Orthopedic Consult      CHIEF COMPLAINT:    Chief Complaint   Patient presents with   Nirmala Coleman Established New Doctor    Pain     bilateral knee       HISTORY OF PRESENT ILLNESS:      The patient is a 64 y.o. female who is being seen at the request of  Ashley Gamboa MD for consultation and evaluation of  Bilateral knee pain. Effie Pineda  presents for bilateral knee pain that has been present for  4 years. The patient does not recall a specific injury. The pain worsens with  stair climbing and arising from a seated position. Instability is not noted. The patient has not had a previous corticosteroid injection. The patient has had previous physical therapy for this problem. The patient has tried oral NSAIDs for this problem previously. Patient has had bilateral knee stem cell injections 2-3 years ago. Past Medical History:    Past Medical History:   Diagnosis Date    AK (actinic keratosis)     from sun exposure    Anesthesia     HARD TIME WAKING UP \"SOMETIMES\".  Arthritis     Asymptomatic bilateral carotid artery stenosis 3/10/2015    Bladder cancer (HCC)     bladder, first time 1993    CAD (coronary artery disease)     Colon polyp     Dr [place]    Diarrhea     Difficult intubation     SMALL MOUTH AND AIRWAY    GERD (gastroesophageal reflux disease)     History of atrial fibrillation     not chronic    Hypertension     Hypothyroid     Obesity     DAISY (obstructive sleep apnea)     NO MACHINE, she has bad sinus issues and requested alternatives instead of a machine, nothing was done    PAC (premature atrial contraction)     occasionally    Prolonged emergence from general anesthesia     Stress incontinence     Wears glasses     FOR READING.        Past Surgical History:    Past Surgical History:   Procedure Laterality Date    BACK SURGERY  2019    stem cell injection    BLADDER SURGERY  8/1993 and 10/2002    for bladder cancer treatment    BREAST BIOPSY      LT. BREAST (BENIGN). , large mass removed    CHOLECYSTECTOMY      COLONOSCOPY      3-4X    CORONARY ANGIOPLASTY  07/03/2014    3 stents inserted via heart cath    CYST REMOVAL Right     3RD FINGER.    CYSTOSCOPY  2014    Dr Jarrett Horne  01-87-19    TURB-bladder tumors removed    CYSTOSCOPY  11/18/2016    fulgeration with biopsies    CYSTOSCOPY N/A 1/21/2020    CYSTO BOTOX INJECTION 100UNITS performed by Alex Grissom MD at [facility] 11/17/2020    CYSTOSCOPY INJECTION BOTOX 100 UNITS performed by Alex Grissom MD at [address] [place] [address]. EYES.    HEMORRHOID SURGERY      HYSTERECTOMY, TOTAL ABDOMINAL  1999    unsure if ovaries remain    KNEE SURGERY Bilateral 07/01/2020    stem cell injections    OTHER SURGICAL HISTORY      abscess on tailbone    TONSILLECTOMY         Current Medications:   Current Outpatient Medications   Medication Sig Dispense Refill    vitamin D3 (CHOLECALCIFEROL) 25 MCG (1000 UT) TABS tablet Take 1 tablet by mouth daily 90 tablet 1    levothyroxine (SYNTHROID) 75 MCG tablet TAKE ONE TABLET BY MOUTH DAILY 90 tablet 1    rosuvastatin (CRESTOR) 20 MG tablet       losartan (COZAAR) 100 MG tablet TAKE ONE TABLET BY MOUTH DAILY 90 tablet 0    metoprolol tartrate (LOPRESSOR) 25 MG tablet TAKE ONE TABLET BY MOUTH TWICE A  tablet 2    furosemide (LASIX) 20 MG tablet TAKE ONE TABLET BY MOUTH DAILY AS NEEDED FOR PEDAL EDEMA 60 tablet 0    pantoprazole (PROTONIX) 40 MG tablet Take 40 mg by mouth daily       clotrimazole-betamethasone (LOTRISONE) 1-0.05 % cream Apply externally to affected area twice daily prn 45 g 1    aspirin 81 MG EC tablet Take 81 mg by mouth daily      nitroGLYCERIN (NITROSTAT) 0.4 MG SL tablet Place 0.4 mg under the tongue every 5 minutes as needed for Chest pain.  Multiple Vitamins-Minerals (THERAPEUTIC MULTIVITAMIN-MINERALS) tablet Take 1 tablet by mouth daily. No current facility-administered medications for this visit. Allergies:    Atorvastatin, Other, Tobramycin, and Molds & smuts    Social History:   Social History     Socioeconomic History    Marital status:      Spouse name: Not on file    Number of children: Not on file    Years of education: Not on file    Highest education level: Not on file   Occupational History    Not on file   Tobacco Use    Smoking status: Former Smoker     Packs/day: 1.50     Years: 30.00     Pack years: 45.00     Types: Cigarettes     Quit date: 1992     Years since quittin.7    Smokeless tobacco: Never Used   Vaping Use    Vaping Use: Never used   Substance and Sexual Activity    Alcohol use: Yes     Comment: COUPLE DRINKS PER WEEK.  Drug use: No    Sexual activity: Not on file   Other Topics Concern    Not on file   Social History Narrative    Not on file     Social Determinants of Health     Financial Resource Strain: Low Risk     Difficulty of Paying Living Expenses: Not hard at all   Food Insecurity: No Food Insecurity    Worried About Running Out of Food in the Last Year: Never true    [address] in the Last Year: Never true   Transportation Needs:     Lack of Transportation (Medical):      Lack of Transportation (Non-Medical):    Physical Activity:     Days of Exercise per Week:     Minutes of Exercise per Session:    Stress:     Feeling of Stress :    Social Connections:     Frequency of Communication with Friends and Family:     Frequency of Social Gatherings with Friends and Family:     Attends [employer] Services:     Active Member of Clubs or Organizations:     Attends Club or Organization Meetings:     Marital Status:    Intimate Partner Violence:     Fear of Current or Ex-Partner:     Emotionally Abused:     Physically Abused:     Sexually Abused:        Family hip log roll and Stinchfield test.  Motor, sensory, vascular examination to the Bilateral lower extremity is intact. Patient has full range of motion of the ankle. Mild chronic lymphedema changes bilaterally. Radiology:     XR KNEE LEFT (1-2 VIEWS)    Result Date: 9/22/2021  History:   Left knee pain Findings:   Standing AP/Lateral/Tunnel/Merchant view xrays of the Left done in the office today shows severe  medial joint space narrowing, tricompartmental osteophytosis, joint line sclerosis medially. No evidence of fracture, subluxation, dislocation, radioopaque foreign body/tumor is noted. Lateral subluxation of the tibia is appreciated. Impression:   Left knee severe  degenerative changes as described above. XR KNEE RIGHT (MIN 4 VIEWS)    Result Date: 9/22/2021  History:   Right knee pain Findings:   Standing AP/Lateral/Tunnel/Merchant view xrays of the Right done in the office today shows severe  medial joint space narrowing, tricompartmental osteophytosis, joint line sclerosis medially. No evidence of fracture, subluxation, dislocation, radioopaque foreign body/tumor is noted. Lateral subluxation of the tibia is appreciated. Impression:   Right knee severe  degenerative changes as described above. ASSESSMENT:     1. Arthritis of both knees         PLAN:       Discussed etiology and natural history of bilateral knee arthritis. The treatment options may include oral anti-inflammatories, bracing, injections, advanced imaging, activity modification, physical therapy and/or surgical intervention. The patient would like to proceed with physical therapy for both land, aquatic and alter-g therapy. The patient will follow up in 7 weeks. We discussed that the patient should call us with any concerns or questions. Return in about 7 weeks (around 11/10/2021) for re- evaluation. No orders of the defined types were placed in this encounter.       Orders Placed This Encounter   Procedures  XR KNEE RIGHT (MIN 4 VIEWS)     Standing Status:   Future     Number of Occurrences:   1     Standing Expiration Date:   9/20/2022    Ambulatory referral to Physical Therapy     Referral Priority:   Routine     Referral Type:   Eval and Treat     Referral Reason:   Specialty Services Required     Number of Visits Requested:   1     I, Jaylen Gleason RN am scribing for and in the presence of Dr. Natalie Barrera  9/23/2021 9:07 AM      I have reviewed and made changes accordingly to the work scribed by Jaylen Gleason RN. The documentation accurately reflects work and decisions made by me. I have also reviewed documentation completed by clinical staff.     Natalie Barrera DO, [facility]  9/23/2021 9:07 AM    This note is created with the assistance of a speech recognition program.  While intending to generate a document that actually reflects the content of the visit, the document can still have some errors including those of syntax and sound a like substitutions which may escape proof reading.  In such instances, actual meaning can be extrapolated by contextual diversion      Electronically signed by Aline Meyer on 9/23/2021 at 9:07 AM

## 2021-09-22 NOTE — LETTER
Dr. Momo Shaikh  [address] [facility] [address]  Dept: [phone]  Dept Fax: [phone]        9/23/21    Patient: Perico Vuong  YOB: 1945    Dear Carlos Elliott MD,    I had the pleasure of seeing one of your patients, Mary Gardner today in the office. Below are the relevant portions of my assessment and plan of care. IMPRESSION:  1. Arthritis of both knees      PLAN:  Discussed etiology and natural history of bilateral knee arthritis. The treatment options may include oral anti-inflammatories, bracing, injections, advanced imaging, activity modification, physical therapy and/or surgical intervention. The patient would like to proceed with physical therapy for both land, aquatic and alter-g therapy. The patient will follow up in 7 weeks. We discussed that the patient should call us with any concerns or questions. Thank you for allowing me to participate in the care of this patient. I will keep you updated on this patient's follow up and I look forward to serving you and your patients again in the future. Please don't hesitate to contact me at my mobile number [phone].         Berenice Kidd

## 2021-09-24 ENCOUNTER — HOSPITAL ENCOUNTER (OUTPATIENT)
Age: 76
Setting detail: SPECIMEN
Discharge: HOME | End: 2021-09-24
Payer: MEDICARE

## 2021-09-24 DIAGNOSIS — E03.9 ACQUIRED HYPOTHYROIDISM: ICD-10-CM

## 2021-09-24 DIAGNOSIS — E55.9 VITAMIN D DEFICIENCY: ICD-10-CM

## 2021-09-24 LAB
TSH SERPL DL<=0.05 MIU/L-ACNC: 3.15 MIU/L (ref 0.3–5)
VITAMIN D 25-HYDROXY: 25.9 NG/ML (ref 30–100)

## 2021-09-27 ENCOUNTER — HOSPITAL ENCOUNTER (OUTPATIENT)
Dept: PHYSICAL THERAPY | Facility: CLINIC | Age: 76
Setting detail: THERAPIES SERIES
Discharge: HOME | End: 2021-09-27
Payer: MEDICARE

## 2021-09-27 DIAGNOSIS — E55.9 VITAMIN D DEFICIENCY: Primary | ICD-10-CM

## 2021-09-27 PROCEDURE — 97161 PT EVAL LOW COMPLEX 20 MIN: CPT

## 2021-09-27 PROCEDURE — 97110 THERAPEUTIC EXERCISES: CPT

## 2021-09-27 RX ORDER — ERGOCALCIFEROL 1.25 MG/1
50000 CAPSULE ORAL WEEKLY
Qty: 4 CAPSULE | Refills: 5 | Status: SHIPPED | OUTPATIENT
Start: 2021-09-27

## 2021-09-27 NOTE — RESULT ENCOUNTER NOTE
Please notify patient that her vitamin D levels are low. Sent prescription for higher doses of vitamin D which is once a week tablet. Discontinued daily vitamin D intake. Will repeat labs in 1 year.   Other test results are normal.

## 2021-09-27 NOTE — CONSULTS
[] [facility]) [facility] &  Therapy  [address]  [phone]  F: [phone] [] [address]  [address]   [address]  P: [phone]  F: [phone] [] [address] &  Therapy  [address]  P: [phone]  F: [phone] [] [address]  P: [phone]  F: [phone] [] [address]  [facility]   [address]   [place]: [phone]  F: [phone]      Physical Therapy Lower Extremity Evaluation    Date:  2021  Patient: Perico Vuong  : 1945  MRN: [id number]  Physician: Dr. Vinh Irene: Medicare  Medical Diagnosis: Jesse knee arthritis  Rehab Codes: F38.754, M25.562, M25.662, M25.661, R26.89  Onset date: 2011  Next Dr's appt.:  Roland Rainey;  Devan    Subjective:   CC: Jesse knee pain  HPI: bone by bone in Jesse knees. Had stem cell by Dr. Sue Tillman. Did PT at [facility]. The L knee are better. Stem cells also to LB. In the morning, if her back hurts, her knees hurt. 1/2 through the day, she does ok. Dr. Roland Rainey as for a second opinion. No prior aquatic PT. Did some silver sneaker aquatics classes 4-5 yrs ago.   \"No pain just an endurance issue\"    PMHx: [] Unremarkable [] Diabetes [x] HTN  [] Pacemaker   [x] MI/Heart Problems 3 stents-no restrictions [x] Cancer bladder: avoids the water [x] Arthritis [x] Other: stomach issues              [] Refer to full medical chart  In EPIC       Comorbidities:   [x] Obesity [] Dialysis  [] N/A   [] Asthma/COPD [] Dementia [] Other:   [] Stroke [] Sleep apnea [] Other:   [] Vascular disease [] Rheumatic disease [] Other:     Tests: [x] X-Ray:  Result Date: 2021  History:   Left knee pain Findings:   Standing AP/Lateral/Tunnel/Merchant view xrays of the Left done in the office today shows severe  medial joint space narrowing, tricompartmental osteophytosis, joint line sclerosis medially. No evidence of fracture, subluxation, dislocation, radioopaque foreign body/tumor is noted. Lateral subluxation of the tibia is appreciated. Impression:   Left knee severe  degenerative changes as described above.     XR KNEE RIGHT (MIN 4 VIEWS)     Result Date: 9/22/2021  History:   Right knee pain Findings:   Standing AP/Lateral/Tunnel/Merchant view xrays of the Right done in the office today shows severe  medial joint space narrowing, tricompartmental osteophytosis, joint line sclerosis medially. No evidence of fracture, subluxation, dislocation, radioopaque foreign body/tumor is noted. Lateral subluxation of the tibia is appreciated. Impression:   Right knee severe  degenerative changes as described above. [] MRI:  [] Other:    Medications: [x] Refer to full medical record [] None [] Other:  Allergies:      [x] Refer to full medical record [] None [] Other:    Function:  Hand Dominance  [] Right  [] Left  Patient lives with:    In what type of home []  One story   [x] Two story   [] Split level   Number of stairs to enter 2 big steps-hangs onto a sink   With handrail on the []  Right to enter   [] Left to enter   Bathroom has a []  Tub only  [] Tub/shower combo   [] Walk in shower    []  Grab bars   Washing machine is on []  Main level   [] Second level   [] Basement   Employer    Job Status []  Normal duty   [] Light duty   [] Off due to condition    [x]  Retired   [] Not employed   [] Disability  [] Other:  []  Return to work: Work activities/duties Roses, trains, family history, weeding the garden   Pulls herself up by the railings.   6+8 which is where her bedroom is    ADL/IADL Previous level of function Current level of function Who currently assists the patient with task   Bathing  [] Independent  [] Assist [x] Independent  [] Assist Dress/grooming [] Independent  [] Assist [x] Independent  [] Assist    Transfer/mobility [] Independent  [] Assist [x] Independent  [] Assist    Feeding [] Independent  [] Assist [x] Independent  [] Assist    Toileting [] Independent  [] Assist [x] Independent  [] Assist    Driving [] Independent  [] Assist [x] Independent  [] Assist    Housekeeping [] Independent  [] Assist [] Independent  [x] Assist Her  helps   Grocery shop/meal prep [] Independent  [] Assist [x] Independent  [] Assist      Gait Prior level of function Current level of function    [] Independent  [] Assist [x] Independent  [] Assist   Device: [] Independent [x] Independent    [] Straight Cane [] Quad cane [] Straight Cane [] Quad cane    [] Standard walker [] Rolling walker   [] 4 wheeled walker [] Standard walker [] Rolling walker   [] 4 wheeled walker    [] Wheelchair [] Wheelchair     Pain:  [x] Yes  [] No Location: R knee  L knee  Pain Rating: (0-10 scale)  0/10   Pain altered Tx:  [] Yes  [x] No  Action:    Symptoms:  [] Improving [x] Worsening as she can't stand up straight  [] Same  Better:  [] AM    [] PM    [] Sit    [] Rise/Sit    []Stand    [] Walk    [] Lying    [] Other:  Worse: [] AM    [] PM    [] Sit    [x] Rise/Sit    [x]Stand    [x] Walk    [] Lying    [] Bend                      [] Valsalva    [] Other:  Sleep: [x] OK    [] Disturbed    Objective:    ROM  ° A/P STRENGTH TESTS (+/-) Left Right Not Tested    Left Right Left Right Ant.  Drawer   []   Hip Flex wfl wfl   Post. Drawer   []   Ext 0 0   Lachmans   []   ER     Valgus Stress   []   IR     Varus Stress   []   ABD   2 2 Millies   []   ADD     Apleys Comp.   []   Knee Flex 127 127   Apleys Dist.   []   Ext 7 4   Hip Scouring   []   Ankle DF     WILLIAMs   []   PF     Piriformis   []   INV     Shantas   []   EVER     Talor Tilt   []        Pat-Fem Grind   []   TFL dominant;     OBSERVATION No Deficit Deficit Not Tested Comments   Posture       Forward Head [] [] []    Rounded Shoulders [] [] []    Kyphosis [] [] []    Lordosis [] [] []    Lateral Shift [] [] []    Scoliosis [] [] []    Iliac Crest [] [] []    PSIS [] [] []    ASIS [] [] []    Genu Valgus [] [x] [] Jesse    Genu Varus [] [] []    Genu Recurvatum [] [] []    Pronation [] [] []    Supination [] [] []    Leg Length Discrp [] [] []    Slumped Sitting [] [] []    Palpation [] [] []    Sensation [] [] []    Edema [] [] []    Neurological [] [] []    Patellar Mobility [] [] []    Patellar Orientation [x] [] []    Gait [] [x] [] Analysis: antalgic jesse. Increased frontal plane movment, decreased hip and knee flexion and extension         FUNCTION Normal Difficult Unable   Sitting [x] [] []   Standing [] [x] []   Ambulation [] [x] []   Groom/Dress [] [] []   Lift/Carry [] [] []   Stairs [] [x] []   Bending [] [] []   Squat [] [x] []   Kneel [] [] []     BALANCE/PROPRIOCEPTION              [x] Not tested   Single leg stance       R                     L                                PAIN   Eyes open                             Sec. Sec                  . []    Eyes closed                          Sec. Sec                  . []        FUNCTIONAL TESTS PAIN NO PAIN COMMENTS   Step Test 4 [] []    6 [] []    8 [] []    Squat [] []      Functional Test: LEFI Score: 69% functionally impaired     Comments:    Assessment:  Patient would benefit from skilled physical therapy services in order to: improve knee extension ROM and quad and glute strength    Problems:    [x] ? Pain:  [x] ? ROM:  [x] ? Strength:  [x] ? Function:  [] Other:       STG: (to be met in 10 treatments)  1. ? Pain: as her activity level increases  2. ? ROM: achieve 0 deg of ext  3. ? Strength: able to perform 2x10\"   4. ? Function: LEFI <40% interference  5. Patient to be independent with home exercise program as demonstrated by performance with correct form without cues. 6.   LTG: (to be met in 20 treatments)  1.  LEFI <30% interference  2. Able to sit stand transfer out of a chair without use of her arms for strength                   Patient goals: \"strengthen muscles in knee and LB area\"    Rehab Potential:  [] Good  [x] Fair  [] Poor   Suggested Professional Referral:  [x] No  [] Yes:  Barriers to Goal Achievement:  [x] No  [] Yes:  Domestic Concerns:  [x] No  [] Yes:    Pt. Education:  [x] Plans/Goals, Risks/Benefits discussed  [x] Home exercise program    Method of Education: [] Verbal  [x] Demo  [x] Written   Access Code: [email or web address]  URL: [email or web address]  Date: 09/27/2021  Prepared by: Bruno Toledo    Exercises  Supine Quad Set - 3 x daily - 7 x weekly - 1 sets - 10 reps - 10 hold  Supine Knee Extension Strengthening - 2 x daily - 7 x weekly - 3 sets - 10 reps  Supine Bridge - 2 x daily - 7 x weekly - 2 sets - 15 reps  Seated Long Arc Quad - 2 x daily - 7 x weekly - 3 sets - 10 reps      Comprehension of Education:  [] Verbalizes understanding. [] Demonstrates understanding. [x] Needs Review. [] Demonstrates/verbalizes understanding of HEP/Ed previously given.     Treatment Plan:  [x] Therapeutic Exercise   25526  [] Iontophoresis: 4 mg/mL Dexamethasone Sodium Phosphate  mAmin  79698   [] Therapeutic Activity  29101 [x] Vasopneumatic cold with compression  47437    [x] Gait Training   09550 [] Ultrasound   44664   [x] Neuromuscular Re-education  01535 [] Electrical Stimulation Unattended  43261   [x] Manual Therapy  90049 [] Electrical Stimulation Attended  03216   [x] Instruction in HEP  [] Lumbar/Cervical Traction  10802   [] Aquatic Therapy   99342 [] Cold/hotpack    [] Massage   65164      [] Dry Needling, 1 or 2 muscles  89667   [] Biofeedback, first 15 minutes   48667  [] Biofeedback, additional 15 minutes   17094 [] Dry Needling, 3 or more muscles  98062       Frequency:  2x/week for 20 visits        Todays Treatment:  Modalities: Game Ready PRN  Precautions: severe OA in Jesse knees  Exercises:  Exercise Reps/ Time Weight/ Level Issued to HEP Completed Comments   Nu Step *               Supine        Quad sets 10x10\"  x x    SAQ 2x10 3 lbs x x    SLR  2x10  x x    Bridges 2x10  x x    Sitting         LAQ 2x10 3 lbs x x            Gym        Total Gym squats *       Step ups *       Calf stretch *                               Other:    Specific Instructions for next treatment:  1. Continue with basic quad and glute strengthening        Evaluation Complexity:  History (Personal factors, comorbidities) [] 0 [] 1-2 [x] 3+   Exam (limitations, restrictions) [x] 1-2 [] 3 [] 4+   Clinical presentation (progression) [x] Stable [] Evolving  [] Unstable   Decision Making [] Low [x] Moderate [] High    [] Low Complexity [x] Moderate Complexity [] High Complexity       Treatment Charges: Mins Units   [x] Evaluation       []  Low       [x]  Moderate       []  High  1   []  Modalities     [x]  Ther Exercise 26 2   []  Manual Therapy     []  Ther Activities     []  Aquatics     []  Vasocompression     []  Other       TOTAL TREATMENT TIME: 48    Time in:1010   Time Out:1103    Electronically signed by: Shelley Banerjee PT        Physician Signature:________________________________Date:__________________  By signing above or cosigning this note, I have reviewed this plan of care and certify a need for medically necessary rehabilitation services.      *PLEASE SIGN ABOVE AND FAX BACK ALL PAGES*

## 2021-09-28 ENCOUNTER — TELEPHONE (OUTPATIENT)
Dept: PRIMARY CARE CLINIC | Age: 76
End: 2021-09-28

## 2021-09-28 ENCOUNTER — OFFICE VISIT (OUTPATIENT)
Dept: UROLOGY | Age: 76
End: 2021-09-28
Payer: MEDICARE

## 2021-09-28 ENCOUNTER — OFFICE VISIT (OUTPATIENT)
Dept: PRIMARY CARE CLINIC | Age: 76
End: 2021-09-28
Payer: MEDICARE

## 2021-09-28 VITALS
WEIGHT: 240 LBS | BODY MASS INDEX: 40.97 KG/M2 | SYSTOLIC BLOOD PRESSURE: 145 MMHG | TEMPERATURE: 95.7 F | DIASTOLIC BLOOD PRESSURE: 90 MMHG | HEART RATE: 88 BPM | HEIGHT: 64 IN

## 2021-09-28 VITALS
HEART RATE: 104 BPM | HEIGHT: 64 IN | RESPIRATION RATE: 18 BRPM | SYSTOLIC BLOOD PRESSURE: 126 MMHG | OXYGEN SATURATION: 97 % | DIASTOLIC BLOOD PRESSURE: 78 MMHG | BODY MASS INDEX: 41.73 KG/M2 | WEIGHT: 244.4 LBS

## 2021-09-28 DIAGNOSIS — M17.11 PRIMARY OSTEOARTHRITIS OF RIGHT KNEE: ICD-10-CM

## 2021-09-28 DIAGNOSIS — E55.9 VITAMIN D DEFICIENCY: ICD-10-CM

## 2021-09-28 DIAGNOSIS — C67.9 MALIGNANT NEOPLASM OF URINARY BLADDER, UNSPECIFIED SITE (HCC): ICD-10-CM

## 2021-09-28 DIAGNOSIS — T63.441A BEE STING REACTION, ACCIDENTAL OR UNINTENTIONAL, INITIAL ENCOUNTER: ICD-10-CM

## 2021-09-28 DIAGNOSIS — Z85.51 HISTORY OF BLADDER CANCER: ICD-10-CM

## 2021-09-28 DIAGNOSIS — E03.8 OTHER SPECIFIED HYPOTHYROIDISM: ICD-10-CM

## 2021-09-28 DIAGNOSIS — Z00.00 ROUTINE GENERAL MEDICAL EXAMINATION AT A HEALTH CARE FACILITY: Primary | ICD-10-CM

## 2021-09-28 DIAGNOSIS — I10 ESSENTIAL HYPERTENSION: ICD-10-CM

## 2021-09-28 DIAGNOSIS — E66.01 CLASS 3 SEVERE OBESITY DUE TO EXCESS CALORIES WITHOUT SERIOUS COMORBIDITY WITH BODY MASS INDEX (BMI) OF 40.0 TO 44.9 IN ADULT (HCC): ICD-10-CM

## 2021-09-28 DIAGNOSIS — E78.5 DYSLIPIDEMIA: ICD-10-CM

## 2021-09-28 DIAGNOSIS — R39.15 URGENCY OF URINATION: Primary | ICD-10-CM

## 2021-09-28 PROCEDURE — 51798 US URINE CAPACITY MEASURE: CPT | Performed by: UROLOGY

## 2021-09-28 PROCEDURE — 1123F ACP DISCUSS/DSCN MKR DOCD: CPT | Performed by: STUDENT IN AN ORGANIZED HEALTH CARE EDUCATION/TRAINING PROGRAM

## 2021-09-28 PROCEDURE — 4040F PNEUMOC VAC/ADMIN/RCVD: CPT | Performed by: UROLOGY

## 2021-09-28 PROCEDURE — 1090F PRES/ABSN URINE INCON ASSESS: CPT | Performed by: UROLOGY

## 2021-09-28 PROCEDURE — 4040F PNEUMOC VAC/ADMIN/RCVD: CPT | Performed by: STUDENT IN AN ORGANIZED HEALTH CARE EDUCATION/TRAINING PROGRAM

## 2021-09-28 PROCEDURE — G8400 PT W/DXA NO RESULTS DOC: HCPCS | Performed by: UROLOGY

## 2021-09-28 PROCEDURE — G8428 CUR MEDS NOT DOCUMENT: HCPCS | Performed by: UROLOGY

## 2021-09-28 PROCEDURE — 99214 OFFICE O/P EST MOD 30 MIN: CPT | Performed by: UROLOGY

## 2021-09-28 PROCEDURE — 1036F TOBACCO NON-USER: CPT | Performed by: UROLOGY

## 2021-09-28 PROCEDURE — 1123F ACP DISCUSS/DSCN MKR DOCD: CPT | Performed by: UROLOGY

## 2021-09-28 PROCEDURE — G0439 PPPS, SUBSEQ VISIT: HCPCS | Performed by: STUDENT IN AN ORGANIZED HEALTH CARE EDUCATION/TRAINING PROGRAM

## 2021-09-28 PROCEDURE — G8417 CALC BMI ABV UP PARAM F/U: HCPCS | Performed by: UROLOGY

## 2021-09-28 RX ORDER — WATER / MINERAL OIL / WHITE PETROLATUM 16 OZ
CREAM TOPICAL
Qty: 1 G | Refills: 0 | Status: SHIPPED | OUTPATIENT
Start: 2021-09-28

## 2021-09-28 RX ORDER — CETIRIZINE HYDROCHLORIDE 5 MG/1
5 TABLET ORAL DAILY
Qty: 10 TABLET | Refills: 0 | Status: SHIPPED | OUTPATIENT
Start: 2021-09-28 | End: 2021-10-08

## 2021-09-28 RX ORDER — PREDNISONE 20 MG/1
20 TABLET ORAL DAILY
Qty: 5 TABLET | Refills: 0 | Status: SHIPPED | OUTPATIENT
Start: 2021-09-28 | End: 2021-10-03

## 2021-09-28 RX ORDER — DIAPER,BRIEF,INFANT-TODD,DISP
EACH MISCELLANEOUS
Qty: 1 G | Refills: 0 | Status: SHIPPED | OUTPATIENT
Start: 2021-09-28 | End: 2021-10-05

## 2021-09-28 ASSESSMENT — ENCOUNTER SYMPTOMS
SHORTNESS OF BREATH: 0
CONSTIPATION: 0
VOMITING: 0
EYES NEGATIVE: 1
BACK PAIN: 0
DIARRHEA: 0
EYE REDNESS: 0
RESPIRATORY NEGATIVE: 1
COUGH: 0
EYE PAIN: 0
NAUSEA: 0
GASTROINTESTINAL NEGATIVE: 1
ABDOMINAL PAIN: 0
WHEEZING: 0

## 2021-09-28 ASSESSMENT — LIFESTYLE VARIABLES
AUDIT-C TOTAL SCORE: 3
HAS A RELATIVE, FRIEND, DOCTOR, OR ANOTHER HEALTH PROFESSIONAL EXPRESSED CONCERN ABOUT YOUR DRINKING OR SUGGESTED YOU CUT DOWN: 0
AUDIT TOTAL SCORE: 3
HOW MANY STANDARD DRINKS CONTAINING ALCOHOL DO YOU HAVE ON A TYPICAL DAY: 0
HOW OFTEN DO YOU HAVE SIX OR MORE DRINKS ON ONE OCCASION: 0
HOW OFTEN DURING THE LAST YEAR HAVE YOU BEEN UNABLE TO REMEMBER WHAT HAPPENED THE NIGHT BEFORE BECAUSE YOU HAD BEEN DRINKING: 0
HAVE YOU OR SOMEONE ELSE BEEN INJURED AS A RESULT OF YOUR DRINKING: 0
HOW OFTEN DURING THE LAST YEAR HAVE YOU HAD A FEELING OF GUILT OR REMORSE AFTER DRINKING: 0
HOW OFTEN DURING THE LAST YEAR HAVE YOU FOUND THAT YOU WERE NOT ABLE TO STOP DRINKING ONCE YOU HAD STARTED: 0
HOW OFTEN DURING THE LAST YEAR HAVE YOU NEEDED AN ALCOHOLIC DRINK FIRST THING IN THE MORNING TO GET YOURSELF GOING AFTER A NIGHT OF HEAVY DRINKING: 0
HOW OFTEN DURING THE LAST YEAR HAVE YOU FAILED TO DO WHAT WAS NORMALLY EXPECTED FROM YOU BECAUSE OF DRINKING: 0
HOW OFTEN DO YOU HAVE A DRINK CONTAINING ALCOHOL: 3

## 2021-09-28 ASSESSMENT — PATIENT HEALTH QUESTIONNAIRE - PHQ9
1. LITTLE INTEREST OR PLEASURE IN DOING THINGS: 0
SUM OF ALL RESPONSES TO PHQ QUESTIONS 1-9: 0
SUM OF ALL RESPONSES TO PHQ9 QUESTIONS 1 & 2: 0
2. FEELING DOWN, DEPRESSED OR HOPELESS: 0

## 2021-09-28 NOTE — TELEPHONE ENCOUNTER
Pharmacy called to verify rx for eurcerin and hydrocortisone cream.    Both should be applied twice a day and 1 small tube to be dispensed per Dr Tay Garcia updated order.

## 2021-09-28 NOTE — PROGRESS NOTES
Review of Systems   Constitutional: Negative. Negative for appetite change, chills and fever. Eyes: Negative. Negative for pain, redness and visual disturbance. Respiratory: Negative. Negative for cough, shortness of breath and wheezing. Cardiovascular: Negative. Negative for chest pain and leg swelling. Gastrointestinal: Negative. Negative for abdominal pain, constipation, diarrhea, nausea and vomiting. Genitourinary: Positive for frequency. Negative for difficulty urinating, dysuria, flank pain, hematuria and urgency. Musculoskeletal: Negative. Negative for back pain, joint swelling and myalgias. Skin: Negative. Negative for rash and wound. Neurological: Negative. Negative for dizziness, tremors and numbness. Hematological: Does not bruise/bleed easily.

## 2021-09-28 NOTE — PROGRESS NOTES
[address] [address]  Dept:  [facility]c [facility] Urology Office Note - Established    Patient:  Javon Abdalla  YOB: 1945  Date: 9/28/2021    The patient is a 64 y.o. female whopresents today for evaluation of the following problems:   Chief Complaint   Patient presents with    1 Year Follow Up     1 yr follow up w/ PVR, discuss cysto botox for OR       HPI  She is here in follow up for OAB. She had an injection last November. She is still doing much better. Her leaking and OAB are improved. Summary of old records: N/A    Additional History: N/A    Procedures Today: N/A    Urinalysis today:  No results found for this visit on 09/28/21. Imaging Reviewed during this Office Visit: none  (results were independently reviewed by physician and radiology report verified)    AUA Symptom Score (9/28/2021):                               Last BUN and creatinine:  Lab Results   Component Value Date    BUN 16 11/06/2020     Lab Results   Component Value Date    CREATININE 0.67 11/06/2020       Additional Lab/Culture results: none    PAST MEDICAL, FAMILY AND SOCIAL HISTORY UPDATE:  Past Medical History:   Diagnosis Date    AK (actinic keratosis)     from sun exposure    Anesthesia     HARD TIME WAKING UP \"SOMETIMES\".     Arthritis     Asymptomatic bilateral carotid artery stenosis 3/10/2015    Bladder cancer (HCC)     bladder, first time 1993    CAD (coronary artery disease)     Colon polyp     Dr Renetta Muñoz    Diarrhea     Difficult intubation     SMALL MOUTH AND AIRWAY    GERD (gastroesophageal reflux disease)     History of atrial fibrillation     not chronic    Hypertension     Hypothyroid     Obesity     DAISY (obstructive sleep apnea)     NO MACHINE, she has bad sinus issues and requested alternatives instead of a machine, nothing was done    [facility] (premature atrial contraction)     occasionally  Prolonged emergence from general anesthesia     Stress incontinence     Wears glasses     FOR READING. Past Surgical History:   Procedure Laterality Date    BACK SURGERY  2019    stem cell injection    BLADDER SURGERY  8/1993 and 10/2002    for bladder cancer treatment    BREAST BIOPSY      LT. BREAST (BENIGN). , large mass removed    CHOLECYSTECTOMY      COLONOSCOPY      3-4X    CORONARY ANGIOPLASTY  07/03/2014    3 stents inserted via heart cath    CYST REMOVAL Right     3RD FINGER.    CYSTOSCOPY  2014    Dr Omega Bedoya  99-20-53    TURB-bladder tumors removed    CYSTOSCOPY  11/18/2016    fulgeration with biopsies    CYSTOSCOPY N/A 1/21/2020    CYSTO BOTOX INJECTION 100UNITS performed by Danita Villagomez MD at [facility] 11/17/2020    CYSTOSCOPY INJECTION BOTOX 100 UNITS performed by Danita Villagomez MD at [address] [place] [address] EYES.    HEMORRHOID SURGERY      HYSTERECTOMY, TOTAL ABDOMINAL  1999    unsure if ovaries remain    KNEE SURGERY Bilateral 07/01/2020    stem cell injections    OTHER SURGICAL HISTORY      abscess on tailbone    TONSILLECTOMY       Family History   Problem Relation Age of Onset   [facility] Cancer Mother         pancreatic age 80    Other Mother         Vascular disease    Cancer Father         bladder age 80    Heart Disease Maternal Grandmother     Heart Disease Maternal Grandfather     Heart Disease Paternal Grandfather     Cancer Paternal Aunt         ovarian     Outpatient Medications Marked as Taking for the 9/28/21 encounter (Office Visit) with Danita Villagomez MD   Medication Sig Dispense Refill    cetirizine (ZYRTEC) 5 MG tablet Take 1 tablet by mouth daily for 10 days 10 tablet 0    hydrocortisone (ALA-EFE) 1 % cream Apply topically 2 times daily. 1 g 0    predniSONE (DELTASONE) 20 MG tablet Take 1 tablet by mouth daily for 5 days 5 tablet 0    Skin Protectants, Misc. (EUCERIN) cream Apply topically as needed.  1 g 0    vitamin D (ERGOCALCIFEROL) 1.25 MG (60531 UT) CAPS capsule Take 1 capsule by mouth once a week 4 capsule 5    levothyroxine (SYNTHROID) 75 MCG tablet TAKE ONE TABLET BY MOUTH DAILY 90 tablet 1    rosuvastatin (CRESTOR) 20 MG tablet       losartan (COZAAR) 100 MG tablet TAKE ONE TABLET BY MOUTH DAILY 90 tablet 0    metoprolol tartrate (LOPRESSOR) 25 MG tablet TAKE ONE TABLET BY MOUTH TWICE A  tablet 2    furosemide (LASIX) 20 MG tablet TAKE ONE TABLET BY MOUTH DAILY AS NEEDED FOR PEDAL EDEMA 60 tablet 0    pantoprazole (PROTONIX) 40 MG tablet Take 40 mg by mouth daily       clotrimazole-betamethasone (LOTRISONE) 1-0.05 % cream Apply externally to affected area twice daily prn 45 g 1    aspirin 81 MG EC tablet Take 81 mg by mouth daily      nitroGLYCERIN (NITROSTAT) 0.4 MG SL tablet Place 0.4 mg under the tongue every 5 minutes as needed for Chest pain.  Multiple Vitamins-Minerals (THERAPEUTIC MULTIVITAMIN-MINERALS) tablet Take 1 tablet by mouth daily. (All medications reviewed and updated by provider sincelast office visit or hospitalization)   Atorvastatin, Other, Tobramycin, and Molds & smuts  Social History     Tobacco Use   Smoking Status Former Smoker    Packs/day: 1.50    Years: 30.00    Pack years: 45.00    Types: Cigarettes    Quit date: 1992    Years since quittin.7   Smokeless Tobacco Never Used      (If patient a smoker, smoking cessation counseling offered)     Social History     Substance and Sexual Activity   Alcohol Use Yes    Comment: COUPLE DRINKS PER WEEK. REVIEW OF SYSTEMS:  Review of Systems      Physical Exam:      Vitals:    21 1505   BP: (!) 145/90   Pulse: 88   Temp: 95.7 °F (35.4 °C)     Body mass index is 41.2 kg/m². Patient is a 64 y.o. female in noacute distress and alert and oriented to person, place and time. Physical Exam  Constitutional: Patient in no acute distress.   Neuro: Alert andoriented to person, place and time.  Psych: Mood normal, affect normal  Lungs: Respiratory effort is normal  Cardiovascular: Warm & Pink  Abdomen: Soft, non-tender, non-distended with no CVA,  No flank tenderness,  Or hepatosplenomegaly   Lymphatics: No palpable lymphadenopathy. Bladder non-tender and not distended. Musculoskeletal: Normalgait and station      and Plan      1. Urgency of urination    2. History of bladder cancer           Plan:         Doing well with botox. She is due for a cysto. Will be due for a botox as well  Will call when she is ready. Return in about 2 months (around 11/28/2021) for surgery. Prescriptions Ordered:  No orders of the defined types were placed in this encounter. Orders Placed:  Orders Placed This Encounter   Procedures    VT MEASUREMENT,POST-VOID RESIDUAL VOLUME BY US,NON-IMAGING            Geetha Parsons MD    Agree with the ROS entered by the MA.

## 2021-09-28 NOTE — PATIENT INSTRUCTIONS
Personalized Preventive Plan for Effie Pineda - 9/28/2021  Medicare offers a range of preventive health benefits. Some of the tests and screenings are paid in full while other may be subject to a deductible, co-insurance, and/or copay. Some of these benefits include a comprehensive review of your medical history including lifestyle, illnesses that may run in your family, and various assessments and screenings as appropriate. After reviewing your medical record and screening and assessments performed today your provider may have ordered immunizations, labs, imaging, and/or referrals for you. A list of these orders (if applicable) as well as your Preventive Care list are included within your After Visit Summary for your review. Other Preventive Recommendations:    · A preventive eye exam performed by an eye specialist is recommended every 1-2 years to screen for glaucoma; cataracts, macular degeneration, and other eye disorders. · A preventive dental visit is recommended every 6 months. · Try to get at least 150 minutes of exercise per week or 10,000 steps per day on a pedometer . · Order or download the FREE \"Exercise & Physical Activity: Your Everyday Guide\" from [employer]. Call 9-568.846.3997 or search [employer] online. · You need 1159-9480 mg of calcium and 6826-0013 IU of vitamin D per day. It is possible to meet your calcium requirement with diet alone, but a vitamin D supplement is usually necessary to meet this goal.  · When exposed to the sun, use a sunscreen that protects against both UVA and UVB radiation with an SPF of 30 or greater. Reapply every 2 to 3 hours or after sweating, drying off with a towel, or swimming. · Always wear a seat belt when traveling in a car. Always wear a helmet when riding a bicycle or motorcycle.

## 2021-09-28 NOTE — LETTER
[address] [address]  Dept: [phone]  Dept Fax: [phone]        9/28/21    Patient: Perico Vuong  YOB: 1945    Dear Carlos Elliott MD,    I had the pleasure of seeing one of your patients, Mary Gardner today in the office today. Below are the relevant portions of my assessment and plan of care. IMPRESSION:  1. Urgency of urination    2. History of bladder cancer        PLAN:  Doing well with botox. She is due for a cysto. Will be due for a botox as well  Will call when she is ready. Return in about 2 months (around 11/28/2021) for surgery. Prescriptions Ordered:  No orders of the defined types were placed in this encounter. Orders Placed:  Orders Placed This Encounter   Procedures    NE MEASUREMENT,POST-VOID RESIDUAL VOLUME BY US,NON-IMAGING         Thank you for allowing me to participate in the care of this patient. I will keep you updated on this patient's follow up and I look forward to serving you and your patients again in the future.         Danita Villagomez MD

## 2021-09-28 NOTE — PROGRESS NOTES
(LASIX) 20 MG tablet TAKE ONE TABLET BY MOUTH DAILY AS NEEDED FOR PEDAL EDEMA Yes Conchita Louis MD   pantoprazole (PROTONIX) 40 MG tablet Take 40 mg by mouth daily  Yes Historical Provider, MD   clotrimazole-betamethasone (LOTRISONE) 1-0.05 % cream Apply externally to affected area twice daily prn Yes Marylu Mendoza MD   aspirin 81 MG EC tablet Take 81 mg by mouth daily Yes Historical Provider, MD   nitroGLYCERIN (NITROSTAT) 0.4 MG SL tablet Place 0.4 mg under the tongue every 5 minutes as needed for Chest pain. Yes Historical Provider, MD   Multiple Vitamins-Minerals (THERAPEUTIC MULTIVITAMIN-MINERALS) tablet Take 1 tablet by mouth daily. Yes Historical Provider, MD   solifenacin (VESICARE) 5 MG tablet Take 5 mg by mouth daily. Historical Provider, MD         Past Medical History:   Diagnosis Date    AK (actinic keratosis)     from sun exposure    Anesthesia     HARD TIME WAKING UP \"SOMETIMES\".  Arthritis     Asymptomatic bilateral carotid artery stenosis 3/10/2015    Bladder cancer (HCC)     bladder, first time 1993    CAD (coronary artery disease)     Colon polyp     Dr Madhu Ann    Diarrhea     Difficult intubation     SMALL MOUTH AND AIRWAY    GERD (gastroesophageal reflux disease)     History of atrial fibrillation     not chronic    Hypertension     Hypothyroid     Obesity     DAISY (obstructive sleep apnea)     NO MACHINE, she has bad sinus issues and requested alternatives instead of a machine, nothing was done    PAC (premature atrial contraction)     occasionally    Prolonged emergence from general anesthesia     Stress incontinence     Wears glasses     FOR READING. Past Surgical History:   Procedure Laterality Date    BACK SURGERY  2019    stem cell injection    BLADDER SURGERY  8/1993 and 10/2002    for bladder cancer treatment    BREAST BIOPSY      LT. BREAST (BENIGN). , large mass removed    CHOLECYSTECTOMY      COLONOSCOPY      3-4X    CORONARY ANGIOPLASTY 07/03/2014    3 stents inserted via heart cath    CYST REMOVAL Right     3RD FINGER.    CYSTOSCOPY  2014    Dr Lan Crocker  35-53-40    TURB-bladder tumors removed    CYSTOSCOPY  11/18/2016    fulgeration with biopsies    CYSTOSCOPY N/A 1/21/2020    CYSTO BOTOX INJECTION 100UNITS performed by Consuelo Gaona MD at [address] [place] 11/17/2020    CYSTOSCOPY INJECTION BOTOX 100 UNITS performed by Consuelo Gaona MD at [facility] [place]  EYES.    HEMORRHOID SURGERY      HYSTERECTOMY, TOTAL ABDOMINAL  1999    unsure if ovaries remain    KNEE SURGERY Bilateral 07/01/2020    stem cell injections    OTHER SURGICAL HISTORY      abscess on tailbone    TONSILLECTOMY           Family History   Problem Relation Age of Onset    Cancer Mother         pancreatic age 80    Other Mother         Vascular disease    Cancer Father         bladder age 80    Heart Disease Maternal Grandmother     Heart Disease Maternal Grandfather     Heart Disease Paternal Grandfather     Cancer Paternal Aunt         ovarian       CareTeam (Including outside providers/suppliers regularly involved in providing care):   Patient Care Team:  Surekha Caldwell MD as PCP - General (Internal Medicine)  Surekha Caldwell MD as PCP - [facility]  Rachelle Dove MD as Consulting Physician (Ophthalmology)  Mehdi Vang MD as Consulting Physician  Mike Nick [place] as Consulting Physician (Cardiology)  Rima Evangelista as Consulting Physician (Gastroenterology)  Travon Rinaldi MD as Surgeon (General Surgery)  Lucia Bush MD as Surgeon (Vascular Surgery)  Consuelo Gaona MD as Consulting Physician (Urology)    Wt Readings from Last 3 Encounters:   09/28/21 244 lb 6.4 oz (110.9 kg)   09/22/21 245 lb (111.1 kg)   09/01/21 244 lb 9.6 oz (110.9 kg)     Vitals:    09/28/21 0959   BP: 126/78   Pulse: 104   Resp: 18   SpO2: 97%   Weight: 244 lb 6.4 oz (110.9 kg)   Height: 5' 4\" (1.626 m) Body mass index is 41.95 kg/m². Based upon direct observation of the patient, evaluation of cognition reveals recent and remote memory intact. General Appearance: alert and oriented to person, place and time, well developed and well- nourished, in no acute distress  Skin: warm and dry, no rash or erythema  Head: normocephalic and atraumatic  Eyes: pupils equal, round, and reactive to light, extraocular eye movements intact, conjunctivae normal  ENT: tympanic membrane, external ear and ear canal normal bilaterally, nose without deformity, nasal mucosa and turbinates normal without polyps  Neck: supple and non-tender without mass, no thyromegaly or thyroid nodules, no cervical lymphadenopathy  Pulmonary/Chest: clear to auscultation bilaterally- no wheezes, rales or rhonchi, normal air movement, no respiratory distress  Cardiovascular: normal rate, regular rhythm, normal S1 and S2, no murmurs, rubs, clicks, or gallops, distal pulses intact, no carotid bruits  Abdomen: soft, non-tender, non-distended, normal bowel sounds, no masses or organomegaly  Extremities: no cyanosis, clubbing or edema  Musculoskeletal: RT knee pain. Restricted ROM  SKIN-redness, swelling and flushed itchy skin of left arm from bee sting  Neurologic: reflexes normal and symmetric, no cranial nerve deficit, gait, coordination and speech normal    Patient's complete Health Risk Assessment and screening values have been reviewed and are found in Flowsheets. The following problems were reviewed today and where indicated follow up appointments were made and/or referrals ordered. Positive Risk Factor Screenings with Interventions:            General Health and ACP:  General  In general, how would you say your health is?: Fair  In the past 7 days, have you experienced any of the following?  New or Increased Pain, New or Increased Fatigue, Loneliness, Social Isolation, Stress or Anger?: None of These  Do you get the social and emotional Administered Date(s) Administered    COVID-19, Pfizer, PF, 30mcg/0.3mL 01/30/2021, 02/20/2021        Health Maintenance   Topic Date Due    Hepatitis C screen  Never done    DTaP/Tdap/Td vaccine (1 - Tdap) Never done    Shingles Vaccine (1 of 2) Never done    Pneumococcal 65+ years Vaccine (1 of 1 - PPSV23) Never done   [employer] Visit (AWV)  Never done    Flu vaccine (1) Never done    Potassium monitoring  11/06/2021    Creatinine monitoring  11/06/2021    Lipid screen  12/14/2021    TSH testing  09/24/2022    COVID-19 Vaccine  Completed    DEXA (modify frequency per FRAX score)  Addressed    Hepatitis A vaccine  Aged Out    Hepatitis B vaccine  Aged Out    Hib vaccine  Aged Out    Meningococcal (ACWY) vaccine  Aged Out     Recommendations for [employer] Due: see orders and patient instructions/AVS.  . Recommended screening schedule for the next 5-10 years is provided to the patient in written form: see Patient Leticia Gong was seen today for medicare awv. Diagnoses and all orders for this visit:    Routine general medical examination at a health care facility    Bee sting reaction, accidental or unintentional, initial encounter  -     cetirizine (ZYRTEC) 5 MG tablet; Take 1 tablet by mouth daily for 10 days  -     hydrocortisone (ALA-EFE) 1 % cream; Apply topically 2 times daily. -     predniSONE (DELTASONE) 20 MG tablet; Take 1 tablet by mouth daily for 5 days  -     Skin Protectants, Misc. (EUCERIN) cream; Apply topically as needed.     Class 3 severe obesity due to excess calories without serious comorbidity with body mass index (BMI) of 40.0 to 44.9 in adult [facility])  Advised lifestyle changes    Essential hypertension-well-controlled on Cozaar, Lasix    Other specified hypothyroidism    Dyslipidemia continued on Crestor    Malignant neoplasm of urinary bladder, unspecified site ([facility] [address]  In remission    Primary osteoarthritis of right knee-has referral for physical therapy. Vitamin D deficiency-on vitamin D supplementation.       Olive Gutierrez MD  [address] [address]  [address] [facility] [address] [address]

## 2021-09-30 ENCOUNTER — HOSPITAL ENCOUNTER (OUTPATIENT)
Dept: PHYSICAL THERAPY | Facility: CLINIC | Age: 76
Setting detail: THERAPIES SERIES
Discharge: HOME | End: 2021-09-30
Payer: MEDICARE

## 2021-09-30 PROCEDURE — 97110 THERAPEUTIC EXERCISES: CPT

## 2021-09-30 NOTE — FLOWSHEET NOTE
[] [facility]) - [facility]  [facility]  [address]  [phone]  F: [phone] [] [address]  [address]  P: [phone]  F: [phone] [x] [address] &  Therapy  [address] [facility]  P: [phone]  F: [phone] [] [address]  P: [phone]  F: [phone] [] [address]  [facility]   [address]   [place]: [phone]  F: [phone]      Physical Therapy Daily Treatment Note    Date:  2021  Patient Name:  Myra Zelaya     :  1945  MRN: [id number]  Physician: Dr. Bernadette Harris: Medicare  Medical Diagnosis: Jesse knee arthritis                        Rehab Codes: M25.561, M25.562, M25.662, M25.661, R26.89  Onset date: 2011                         Next 's appt.:  Giselle Ellis;  Clearance Ok  Visit# / total visits: ; Progress note for Medicare patient due at visit 10     Cancels/No Shows: 0/0    Subjective: Pt with no pain currently into the knees this morning. Denying any increased pain following her initial visit as well. Mentions that she has her HEP and completed it. Pain:  [] Yes  [x] No Location: L/R knee Pain Rating: (0-10 scale) -/10  Pain altered Tx:  [x] No  [] Yes  Action:  Comments:    Objective:         Todays Treatment:  Modalities: Game Ready PRN  Precautions: severe OA in Jesse knees  Exercises:  Exercise Reps/ Time Weight/ Level  HEP Completed Comments   Scifit 8'  L1.5   x                   Supine             Quad sets 10x10\"   x x     SAQ 2x10 3 lbs x x     SLR  2x10   x x     Bridges 2x10   x x     Sitting              LAQ 2x10 3 lbs x x                   Gym             Total Gym squats *       - pt wished to hold    Step ups 10x ea 4\"    x      HS curls 15x   x    Calf stretch 3x20\"        wedge at middle level   Seated HS  3x20\"     x                                  Other:     Specific Instructions for next treatment:  1. Continue with basic quad and glute strengthening        Treatment Charges: Mins Units   []  Modalities     [x]  Ther Exercise 40 3   []  Manual Therapy     []  Ther Activities     []  Aquatics     []  Vasocompression     []  Other     Total Treatment time 40 3       Assessment: [x] Progressing toward goals. Initiated treatment on Sci fit followed by instruction and completion of stretches. Reviewed previously completed therex and completed additional therex to address LE strength. Provided rest breaks as necessary. Pt declined completing TGym squats d/t being the same machine that irritated her L knee during her previous PT. Only mild fatigue post ex. [] No change. [] Other:  [x] Patient would continue to benefit from skilled physical therapy services in order to: increase L/R knee extension ROM and LE strength to decrease pain and improve function.         STG: (to be met in 10 treatments)  1. ? Pain: as her activity level increases  2. ? ROM: achieve 0 deg of ext  3. ? Strength: able to perform 2x10\"   4. ? Function: LEFI <40% interference  5. Patient to be independent with home exercise program as demonstrated by performance with correct form without cues. 6.    LTG: (to be met in 20 treatments)  1. LEFI <30% interference  2. Able to sit stand transfer out of a chair without use of her arms for strength                    Patient goals: \"strengthen muscles in knee and LB area\"    Pt. Education:  [x] Yes  [] No  [x] Reviewed Prior HEP/Ed  Method of Education: [x] Verbal  [x] Demo  [] Written  Comprehension of Education:  [x] Verbalizes understanding. [x] Demonstrates understanding. [x] Needs review. [] Demonstrates/verbalizes HEP/Ed previously given.      Plan: [x] Continue current frequency toward long and short term goals.     [x] Specific Instructions for subsequent treatments: Cont per POC      Time In: 11:05am            Time Out: 11:50am    Electronically signed by:  Mary Anne Monsalve PTA

## 2021-09-30 NOTE — PRE-CERTIFICATION NOTE
Medicare Cap   [x] Physical Therapy  [] Speech Therapy  [] Occupational therapy  *PT and Speech caps combine      $2100 Limit for PT and Speech combined  $2100 Limit for OT individually  At the beginning of the month where you expect to go over $2100, please add the [address] [place] [address] modifier      Patient Name: Mare Clements: 1945    Note:  This is an estimate of charges billed.      Date of [address] Name # units/ charge $$$ charge Daily Total Charge Ongoing Total $$$   9/27/21 Eval+ TE 1+1 98.01+22.84 120.85 120.85   9/30/21 TE   3  29.21+22.84+22.84  74.89 195.84

## 2021-10-04 ENCOUNTER — HOSPITAL ENCOUNTER (OUTPATIENT)
Dept: PHYSICAL THERAPY | Facility: CLINIC | Age: 76
Setting detail: THERAPIES SERIES
Discharge: HOME | End: 2021-10-04
Payer: MEDICARE

## 2021-10-04 PROCEDURE — 97110 THERAPEUTIC EXERCISES: CPT

## 2021-10-04 NOTE — FLOWSHEET NOTE
[] [facility]) - [facility]  [address]  [phone]  F: [phone] [] [address]  [address]   [address]  P: [phone]  F: [phone] [x] [address] &  Therapy  [address] [facility]  P: [phone]  F: [phone] [] [address]  P: [phone]  F: [phone] [] [address]  [facility]   [address]   [place]: [phone]  F: [phone]      Physical Therapy Daily Treatment Note    Date:  10/4/2021  Patient Name:  Jaclyn Dupont     :  1945  MRN: [id number]  Physician: Dr. Malcolm Ahumada: Medicare  Medical Diagnosis: Jesse knee arthritis                        Rehab Codes: M25.561, M25.562, M25.662, M25.661, R26.89  Onset date: 2011                         Next 's appt.:  Zackary Mckinley;  Rowan Junie  Visit# / total visits: 3/20; Progress note for Medicare patient due at visit 10     Cancels/No Shows: 0/0    Subjective: Pt with no increased pain following her previous visit, L knee a little sore with doing more at home. Arrives with script for LB PT.      Pain:  [] Yes  [x] No Location: L/R knee Pain Rating: (0-10 scale) -/10  Pain altered Tx:  [x] No  [] Yes  Action:  Comments:    Objective:         Todays Treatment:  Modalities: Game Ready PRN  Precautions: severe OA in Jesse knees  Exercises:  Exercise Reps/ Time Weight/ Level  HEP Completed Comments   Scifit 8'  L1.5   x                   Supine             Quad sets 10x10\"   x x     SAQ 2x10 3 lbs x x     SLR  2x10   x x     Bridges 2x10   x x     Sitting              LAQ 2x10 3 lbs x x     SL        Hip abduction 2x10   x    Clams 2x10      x     Gym             Total Gym squats *       - pt wished to hold    Step ups 10x ea 4\"    x      HS curls 15x       Hi pabduction 10x ea   x    Calf stretch 3x20\"     x  wedge at middle level   Seated HS  3x20\"     x                                  Other:     Specific Instructions for next treatment:  1. Continue with basic quad and glute strengthening        Treatment Charges: Mins Units   []  Modalities     [x]  Ther Exercise 42 3   []  Manual Therapy     []  Ther Activities     []  Aquatics     []  Vasocompression     []  Other     Total Treatment time 42 3       Assessment: [x] Progressing toward goals. Continued with Scitfit warm up followed by stretches. Mat therex completed as noted above. Therex completed bilaterally to ensure symmetry in strength. Able to add side lying therex to address more glute strengthening. PT mentions more difficulty completing SL therex on LLE. Cueing needed for technique. Increased pain with WB through LLE. Will continue to progress as tripp. [] No change. [] Other:  [x] Patient would continue to benefit from skilled physical therapy services in order to: increase L/R knee extension ROM and LE strength to decrease pain and improve function.         STG: (to be met in 10 treatments)  1. ? Pain: as her activity level increases  2. ? ROM: achieve 0 deg of ext  3. ? Strength: able to perform 2x10\"   4. ? Function: LEFI <40% interference  5. Patient to be independent with home exercise program as demonstrated by performance with correct form without cues. 6.    LTG: (to be met in 20 treatments)  1. LEFI <30% interference  2. Able to sit stand transfer out of a chair without use of her arms for strength                    Patient goals: \"strengthen muscles in knee and LB area\"    Pt. Education:  [x] Yes  [] No  [x] Reviewed Prior HEP/Ed  Method of Education: [x] Verbal  [x] Demo  [] Written  Comprehension of Education:  [x] Verbalizes understanding. [x] Demonstrates understanding. [x] Needs review.   [] Demonstrates/verbalizes HEP/Ed previously given.     Plan: [x] Continue current frequency toward long and short term goals.     [x] Specific Instructions for subsequent treatments: Cont per POC      Time In: 8:02am            Time Out: 8:49am    Electronically signed by:  Melvina Dent PTA

## 2021-10-06 ENCOUNTER — HOSPITAL ENCOUNTER (OUTPATIENT)
Dept: PHYSICAL THERAPY | Facility: CLINIC | Age: 76
Setting detail: THERAPIES SERIES
Discharge: HOME | End: 2021-10-06
Payer: MEDICARE

## 2021-10-06 PROCEDURE — 97110 THERAPEUTIC EXERCISES: CPT

## 2021-10-06 NOTE — PRE-CERTIFICATION NOTE
Medicare Cap   [x] Physical Therapy  [] Speech Therapy  [] Occupational therapy  *PT and Speech caps combine      $2100 Limit for PT and Speech combined  $2100 Limit for OT individually  At the beginning of the month where you expect to go over $2100, please add the [address] [place] [address] modifier      Patient Name: Hailey Batista: 1945    Note:  This is an estimate of charges billed.      Date of [address] Name # units/ charge $$$ charge Daily Total Charge Ongoing Total $$$   9/27/21 Eval+ TE 1+1 98.01+22.84 120.85 120.85   9/30/21 TE  3  29.21+22.84+22.84  74.89 195.84   10/4/21 TE 3  74.89 270.73   10/6/21 TE 3  74.89 345.62

## 2021-10-06 NOTE — FLOWSHEET NOTE
[] [facility]) - [facility] &  Therapy  [address]  [phone]  F: [phone] [] [address]  [address]   [address]  P: [phone]  F: [phone] [x] [address] &  Therapy  [address] [facility]  P: [phone]  F: [phone] [] [address]  P: [phone]  F: [phone] [] [address]  [facility]   [address]   [place]: [phone]  F: [phone]      Physical Therapy Daily Treatment Note    Date:  10/6/2021  Patient Name:  Too Vickers     :  1945  MRN: [id number]  Physician: Dr. Glynn Eaton: Medicare  Medical Diagnosis: Jesse knee arthritis                        Rehab Codes: M25.561, M25.562, M25.662, M25.661, R26.89  Onset date: 2011                         Next 's appt.:  Richard Ortega;  Livan [place]  Visit# / total visits: ; Progress note for Medicare patient due at visit 10     Cancels/No Shows: 0/0    Subjective: Pt states that she slept on her side last night which always causes a little more pain and soreness. No issues in regards to her L/R knee after her previous visit. Pain:  [] Yes  [x] No Location: L/R knee Pain Rating: (0-10 scale) -/10  Pain altered Tx:  [x] No  [] Yes  Action:  Comments:    Objective:         Todays Treatment:  Modalities: Game Ready PRN  Precautions: severe OA in Jesse knees  Exercises:  Exercise Reps/ Time Weight/ Level  HEP Completed Comments   Scifit 8'  L1.5   x                   Supine             Quad sets 10x10\"   x x     SAQ 2x10 3 lbs x x     SLR  2x10   x x     Bridges 2x10   x x     Sitting              LAQ 25x 3 lbs x x     SL        Hip abduction 10-15x   x    Clams 2x10      x     Gym             Total Gym squats *       - pt wished to hold d/t pain   Step ups 10x ea 4\"    x  - increase next   HS curls 15x       3 way hip 10x ea   x    TKE 15x3\" ea blue  x    Calf stretch 3x30\"     x  wedge at middle level   Seated HS  3x20\"     x                                  Other:     Specific Instructions for next treatment:  1. Continue with basic quad and glute strengthening        Treatment Charges: Mins Units   []  Modalities     [x]  Ther Exercise 44 3   []  Manual Therapy     []  Ther Activities     []  Aquatics     []  Vasocompression     []  Other     Total Treatment time 44 3       Assessment: [x] Progressing toward goals. Increased reps of LAQ and continued with mat therex as previous to continue to build LE strength and stability. Side lying therex continued to be challenge d/t weakness. Added resisted TKE for quad strengthening and added hip flexion and extension in standing. Pt with c/o LB pain with standing therex specifically hip extension. Demonstrated forward flexed posture in standing as well. [] No change. [] Other:  [x] Patient would continue to benefit from skilled physical therapy services in order to: increase L/R knee extension ROM and LE strength to decrease pain and improve function.         STG: (to be met in 10 treatments)  1. ? Pain: as her activity level increases  2. ? ROM: achieve 0 deg of ext  3. ? Strength: able to perform 2x10\"   4. ? Function: LEFI <40% interference  5. Patient to be independent with home exercise program as demonstrated by performance with correct form without cues. 6.    LTG: (to be met in 20 treatments)  1. LEFI <30% interference  2. Able to sit stand transfer out of a chair without use of her arms for strength                    Patient goals: \"strengthen muscles in knee and LB area\"    Pt. Education:  [x] Yes  [] No  [] Reviewed Prior HEP/Ed  Method of Education: [x] Verbal  [x] Demo  [] Written  Comprehension of Education:  [x] Verbalizes understanding.   [x] Demonstrates understanding. [] Needs review. [] Demonstrates/verbalizes HEP/Ed previously given. Plan: [x] Continue current frequency toward long and short term goals.     [x] Specific Instructions for subsequent treatments: Cont per POC      Time In: 10:00am            Time Out: 10:51am    Electronically signed by:  Yossi Agudelo PTA

## 2021-10-08 ENCOUNTER — HOSPITAL ENCOUNTER (OUTPATIENT)
Dept: PHYSICAL THERAPY | Facility: CLINIC | Age: 76
Setting detail: THERAPIES SERIES
Discharge: HOME | End: 2021-10-08
Payer: MEDICARE

## 2021-10-08 PROCEDURE — 97110 THERAPEUTIC EXERCISES: CPT

## 2021-10-08 NOTE — FLOWSHEET NOTE
[] [facility]) - [facility]  [facility]  [address]  [phone]  F: [phone] [] [address]  [address]   [address]  P: [phone]  F: [phone] [x] [address] &  Therapy  [address] [facility] [address]  P: [phone]  F: [phone] [] [address]  P: [phone]  F: [phone] [] [address]  [facility]   [address]   [place]: [phone]  F: [phone]      Physical Therapy Daily Treatment Note    Date:  10/8/2021  Patient Name:  Malvin Hagen     :  1945  MRN: [id number]  Physician: Dr. Ankit Torres: Medicare  Medical Diagnosis: Jesse knee arthritis                        Rehab Codes: V01.577, M25.562, M25.662, M25.661, R26.89  Onset date: 2011                         Next 's appt.:  Sharol Lombard;  Gorge Pichardo  Visit# / total visits: ; Progress note for Medicare patient due at visit 10     Cancels/No Shows: 0/0    Subjective: Pt feels that she is maybe walking a little better, feels a little straighter. Pain:  [] Yes  [x] No Location: L/R knee Pain Rating: (0-10 scale) -/10  Pain altered Tx:  [x] No  [] Yes  Action:  Comments:    Objective:         Todays Treatment:  Modalities: Game Ready PRN  Precautions: severe OA in Jesse knees  Exercises:  Exercise Reps/ Time Weight/ Level  HEP Completed Comments   Scifit 10'  L2.0   x                   Supine             Quad sets 10x10\"   x x     SAQ 2x10 3 lbs x x     SLR  2x10   x x     Bridges 2x10   x x     Sitting              LAQ 25x 3 lbs x x     SL        Hip abduction 10-15x   x    Clams 2x10      x     Gym             Total Gym squats *       - pt wished to hold d/t pain   Mini squat 10x   x    Step ups 10x ea 6\"    x     HS curls frequency toward long and short term goals.     [x] Specific Instructions for subsequent treatments: Cont per POC      Time In: 10:00am            Time Out: 10:53am    Electronically signed by:  Rodrigo Tello PTA

## 2021-10-08 NOTE — PRE-CERTIFICATION NOTE
Medicare Cap   [x] Physical Therapy  [] Speech Therapy  [] Occupational therapy  *PT and Speech caps combine      $2100 Limit for PT and Speech combined  $2100 Limit for OT individually  At the beginning of the month where you expect to go over $2100, please add the [address] [place] [address] modifier      Patient Name: Contreras Naidu: 1945    Note:  This is an estimate of charges billed.      Date of [address] Name # units/ charge $$$ charge Daily Total Charge Ongoing Total $$$   9/27/21 Eval+ TE 1+1 98.01+22.84 120.85 120.85   9/30/21 TE  3  29.21+22.84+22.84  74.89 195.84   10/4/21 TE 3  74.89 270.73   10/6/21 TE 3  74.89 345.62   10/8/21 TE 3  74.89 420.51

## 2021-10-11 ENCOUNTER — HOSPITAL ENCOUNTER (OUTPATIENT)
Dept: PHYSICAL THERAPY | Facility: CLINIC | Age: 76
Setting detail: THERAPIES SERIES
Discharge: HOME | End: 2021-10-11
Payer: MEDICARE

## 2021-10-11 PROCEDURE — 97110 THERAPEUTIC EXERCISES: CPT

## 2021-10-11 NOTE — FLOWSHEET NOTE
per POC      Time In: 3:00pm            Time Out: 3:48pm    Electronically signed by:  Candy Dallas PTA

## 2021-10-11 NOTE — PRE-CERTIFICATION NOTE
Medicare Cap   [x] Physical Therapy  [] Speech Therapy  [] Occupational therapy  *PT and Speech caps combine      $2100 Limit for PT and Speech combined  $2100 Limit for OT individually  At the beginning of the month where you expect to go over $2100, please add the [address] [place] 22 modifier      Patient Name: Mare Clements: 1945    Note:  This is an estimate of charges billed.      Date of [address] Name # units/ charge $$$ charge Daily Total Charge Ongoing Total $$$   9/27/21 Eval+ TE 1+1 98.01+22.84 120.85 120.85   9/30/21 TE  3  29.21+22.84+22.84  74.89 195.84   10/4/21 TE 3  74.89 270.73   10/6/21 TE 3  74.89 345.62   10/8/21 TE 3  74.89 420.51   10/11/21 TE 3  74.89 495.40

## 2021-10-12 ENCOUNTER — TELEPHONE (OUTPATIENT)
Dept: UROLOGY | Age: 76
End: 2021-10-12

## 2021-10-12 NOTE — TELEPHONE ENCOUNTER
[facility] OR 11/12/21 Cysto, Botox 100U    PAT: 11/1/21 @ 1045  Arrival Time: 0700  Procedure Time: 0900  NPO after midnight. **STOP blood thinners 7 days prior**    Left message for patient to call office back with instructions. Emailed all instructions.

## 2021-10-13 ENCOUNTER — HOSPITAL ENCOUNTER (OUTPATIENT)
Dept: PHYSICAL THERAPY | Facility: CLINIC | Age: 76
Setting detail: THERAPIES SERIES
Discharge: HOME | End: 2021-10-13
Payer: MEDICARE

## 2021-10-13 PROCEDURE — 97110 THERAPEUTIC EXERCISES: CPT

## 2021-10-13 NOTE — FLOWSHEET NOTE
[] [facility]) - [facility]  [facility]  [address]  [phone]  F: [phone] [] [address]  [address]   [address]  P: [phone]  F: [phone] [x] [address] &  Therapy  [address]  P: [phone]  F: [phone] [] [address]  P: [phone]  F: [phone] [] [address]  [facility]   [address]   [place]: [phone]  F: [phone]      Physical Therapy Daily Treatment Note    Date:  10/13/2021  Patient Name:  Alan Cr     :  1945  MRN: [id number]  Physician: Dr. Lili Lyons: Medicare  Medical Diagnosis: Jesse knee arthritis                        Rehab Codes: W95.960, M25.562, M25.662, M25.661, R26.89  Onset date: 2011                         Next 's appt.:  Doloris Star;  Grace Shen  Visit# / total visits: ; Progress note for Medicare patient due at visit 10     Cancels/No Shows: 0/0    Subjective: Pt continues to mention continued L/R knee pain, does feel she is walking better. Pain:  [] Yes  [x] No Location: L/R knee Pain Rating: (0-10 scale) 1-2/10  Pain altered Tx:  [x] No  [] Yes  Action:  Comments:    Objective:         Todays Treatment:  Modalities: Game Ready PRN  Precautions: severe OA in Jesse knees  Exercises:  Exercise Reps/ Time Weight/ Level  HEP Completed Comments   Scifit 10'  L2.0   x                   Supine             Quad sets    x      SAQ 25x 4 lbs x x     SLR  2x10   x x     Bridges 25x   x x     Sitting              LAQ 25x 4 lbs x x     SL        Hip abduction 10-15x   x    Clams 2x10      x     Gym             Total Gym squats *       - pt wished to hold d/t pain   Mini squat 10x   x    Step ups 10x ea 6\"    x     Lateral step ups 5x ea 6\"  x    HS curls 15x   x    3 way hip 15x ea   x    TKE 20x3\" ea blue  x    Calf stretch 3x30\"     x  wedge at middle level   Stool HS  3x20\"     x                                  Other:     Specific Instructions for next treatment:  1. Continue with basic quad and glute strengthening        Treatment Charges: Mins Units   []  Modalities     [x]  Ther Exercise 44 3   []  Manual Therapy     []  Ther Activities     []  Aquatics     []  Vasocompression     []  Other     Total Treatment time 44 3       Assessment: [x] Progressing toward goals. Continued with current program to strengthening BLE. Mat program completed with good tolerance, cueing needed for technique with sidelying therex. Increased reps with standing program. Added lateral step ups with increased anterior right knee pain noted. Pt with increased BLE soreness post ex. [] No change. [] Other:  [x] Patient would continue to benefit from skilled physical therapy services in order to: increase L/R knee extension ROM and LE strength to decrease pain and improve function.         STG: (to be met in 10 treatments)  1. ? Pain: as her activity level increases  2. ? ROM: achieve 0 deg of ext  3. ? Strength: able to perform 2x10\"   4. ? Function: LEFI <40% interference  5. Patient to be independent with home exercise program as demonstrated by performance with correct form without cues. 6.    LTG: (to be met in 20 treatments)  1. LEFI <30% interference  2. Able to sit stand transfer out of a chair without use of her arms for strength                    Patient goals: \"strengthen muscles in knee and LB area\"    Pt. Education:  [x] Yes  [] No  [] Reviewed Prior HEP/Ed  Method of Education: [x] Verbal  [x] Demo  [] Written  Comprehension of Education:  [x] Verbalizes understanding. [x] Demonstrates understanding. [] Needs review. [] Demonstrates/verbalizes HEP/Ed previously given.      Plan: [x] Continue current frequency toward long and short term goals.     [x] Specific Instructions for subsequent treatments: Cont per POC      Time In: 10:55am            Time Out: 11:44am    Electronically signed by:  Cesia Ndiaye PTA

## 2021-10-13 NOTE — PRE-CERTIFICATION NOTE
Medicare Cap   [x] Physical Therapy  [] Speech Therapy  [] Occupational therapy  *PT and Speech caps combine      $2100 Limit for PT and Speech combined  $2100 Limit for OT individually  At the beginning of the month where you expect to go over $2100, please add the [address] [place] modifier      Patient Name: Belia Massed: 1945    Note:  This is an estimate of charges billed.      Date of [address] Name # units/ charge $$$ charge Daily Total Charge Ongoing Total $$$   9/27/21 Eval+ TE 1+1 98.01+22.84 120.85 120.85   9/30/21 TE  3  29.21+22.84+22.84  74.89 195.84   10/4/21 TE 3  74.89 270.73   10/6/21 TE 3  74.89 345.62   10/8/21 TE 3  74.89 420.51   10/11/21 TE 3  74.89 495.40   10/13/21 TE 3  74.89 570.29

## 2021-10-14 NOTE — TELEPHONE ENCOUNTER
Procedure moved to 12/10/21 per patient's request.   Cysto botox 100 units @ [facility] 12/10/21 8:00am **STOP BLOOD THINNERS 12/03/21**  PAT @ [facility] 11/29/21 9:30am         Spoke with patient, procedure info emailed.

## 2021-10-15 ENCOUNTER — HOSPITAL ENCOUNTER (OUTPATIENT)
Dept: PHYSICAL THERAPY | Facility: CLINIC | Age: 76
Setting detail: THERAPIES SERIES
Discharge: HOME | End: 2021-10-15
Payer: MEDICARE

## 2021-10-15 PROCEDURE — 97110 THERAPEUTIC EXERCISES: CPT

## 2021-10-15 NOTE — FLOWSHEET NOTE
[] [facility]) [facility] &  Therapy  [address]  [phone]  F: [phone] [] [address] [address]   [address]  P: [phone]  F: [phone] [x] [address] &  Therapy  [address]  P: [phone]  F: [phone] [] [address]  P: [phone]  F: [phone] [] [address]  [facility]   [address]   [place]: [phone]  F: [phone]      Physical Therapy Daily Treatment Note    Date:  10/15/2021  Patient Name:  Nguyen Collins     :  1945  MRN: [id number]  Physician: Dr. Stacy Phillips/Dr. Kev Castro: Medicare  Medical Diagnosis: Jesse knee arthritis/LBP                       Rehab Codes: M25.561, M25.562, M25.662, M25.661, R26.89, M54.5  Onset date: 2011                         Next Dr's appt.:  Oz Lockett;  Evonne Grey  Visit# / total visits: ; Progress note for Medicare patient due at visit 10     Cancels/No Shows: 0/0    Subjective: Pt presents with a script for LBP from Dr. Evonne Grey. She reports that if she sleeps in prone, she will wake up with significant pain. She walks forward bent and finds standing up straight is very difficult. She sits a great deal of the day. She feels that she is improving with her function related to her LEs. Goal today is to add to her HEP for LE strengthening.       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  Exercises:  Exercise Reps/ Time Weight/ Level  HEP Completed Comments   Scifit 10'  L2.0                      Supine             Quad sets    x      SAQ 25x 4 lbs x      SLR  2x10   x      Bridges 25x   x      Sitting              LAQ 25x 4 lbs x      SL        Hip abduction 10-15x       Clams 2x10           Gym             Sit to stands 10x  * X    Step ups 20x ea 4\" * X Posterior and lateral   3 way hip 10x ea  * X    TKE 20x3\" ea blue      Calf stretch on SB 3x30\"  L5   X    Stool HS  3x20\"           Lunges FW  10x    * X     Marches 10    * X     Other:      STRENGTH  ROM    Left Right     L1-2 Hip Flex       Hip Abd 2 2     L3-4 Knee Ext 5 5      L4 Ankle DF        L5 EHL       S1 Plant. Flex   Lumbar    Abdominals   Flexion    Erector Spinae   Extension    Glut max 1 1 Rotation L  R   Glut med 2 2 Sidebend L R      UE/LE        Knee 10-  127 2-116                                     Specific Instructions for next treatment:  1. Continue with basic quad and glute strengthening        Treatment Charges: Mins Units   []  Modalities     [x]  Ther Exercise 41 3   []  Manual Therapy     []  Ther Activities     []  Aquatics     []  Vasocompression     []  Other     Total Treatment time 41 3       Assessment: [x] Progressing toward goals. Pt demonstrates significant weakness with glut max and med. Hip ext ROM is significantly limited. Knee ext ROM is still lacking 2-10 deg. Functional ex progression was limited by Jesse knee pain. [] No change. [] Other:  [x] Patient would continue to benefit from skilled physical therapy services in order to: increase L/R knee extension ROM and LE strength to decrease pain and improve function.         STG: (to be met in 10 treatments)  1. ? Pain: as her activity level increases  2. ? ROM: achieve 0 deg of ext  3. ? Strength: able to perform 2x10\"   4. ? Function: LEFI <40% interference  5. Patient to be independent with home exercise program as demonstrated by performance with correct form without cues. 6.    LTG: (to be met in 20 treatments)  1. LEFI <30% interference  2.  Able to sit stand transfer out of a chair without use of her arms for strength                    Patient goals: \"strengthen muscles in knee and LB area\"    Pt. Education:  [x] Yes  [] No  [] Reviewed Prior HEP/Ed  Method of Education: [] Verbal  [x] Demo  [x] Written   Access Code: [email or web address]  URL: [email or web address]  Date: 10/15/2021  Prepared by: Simone Hernandez    Exercises  Sit to Stand - 2 x daily - 7 x weekly - 2 sets - 10 reps  Lateral Step Up with Counter Support - 2 x daily - 7 x weekly - 2 sets - 10 reps  Forward Step Up with Counter Support - 2 x daily - 7 x weekly - 2 sets - 10 reps  Lunge with Counter Support - 2 x daily - 7 x weekly - 2 sets - 10 reps  Heel rises with counter support - 2 x daily - 7 x weekly - 2 sets - 10 reps  Standing Hip Abduction with Counter Support - 2 x daily - 7 x weekly - 2 sets - 10 reps  Standing Hip Extension with Counter Support - 2 x daily - 7 x weekly - 2 sets - 10 reps  Standing March with Counter Support - 2 x daily - 7 x weekly - 2 sets - 10 reps      Comprehension of Education:  [x] Verbalizes understanding. [x] Demonstrates understanding. [] Needs review. [] Demonstrates/verbalizes HEP/Ed previously given. Plan: [x] Continue current frequency toward long and short term goals.     [x] Specific Instructions for subsequent treatments: Cont per POC      Time In: 1100            Time Out: 1200    Electronically signed by:  Simone Hernandez PT

## 2021-10-18 ENCOUNTER — APPOINTMENT (OUTPATIENT)
Dept: PHYSICAL THERAPY | Facility: CLINIC | Age: 76
End: 2021-10-18
Payer: MEDICARE

## 2021-10-20 ENCOUNTER — HOSPITAL ENCOUNTER (OUTPATIENT)
Dept: PHYSICAL THERAPY | Facility: CLINIC | Age: 76
Setting detail: THERAPIES SERIES
Discharge: HOME | End: 2021-10-20
Payer: MEDICARE

## 2021-10-20 PROCEDURE — 97110 THERAPEUTIC EXERCISES: CPT

## 2021-10-20 NOTE — PRE-CERTIFICATION NOTE
Medicare Cap   [x] Physical Therapy  [] Speech Therapy  [] Occupational therapy  *PT and Speech caps combine      $2100 Limit for PT and Speech combined  $2100 Limit for OT individually  At the beginning of the month where you expect to go over $2100, please add the [address] [place] [address] modifier      Patient Name: Chio Andrews: 1945    Note:  This is an estimate of charges billed.      Date of [address] Name # units/ charge $$$ charge Daily Total Charge Ongoing Total $$$   9/27/21 Eval+ TE 1+1 98.01+22.84 120.85 120.85   9/30/21 TE  3  29.21+22.84+22.84  74.89 195.84   10/4/21 TE 3  74.89 270.73   10/6/21 TE 3  74.89 345.62   10/8/21 TE 3  74.89 420.51   10/11/21 TE 3  74.89 495.40   10/13/21 TE 3  74.89 570.29   10/15/21 te 3  74.89 645.18   10/20/21 TE 3  74.89 720.07

## 2021-10-20 NOTE — FLOWSHEET NOTE
[] [facility]) - [facility] &  Therapy  [address]  [phone]  F: [phone] [] [address]  [employer] [address]   [address]  P: [phone]  F: [phone] [x] [address] &  Therapy  [address]  P: [phone]  F: [phone] [] [address]  P: [phone]  F: [phone] [] [address]  [facility]   [address]   [place]: [phone]  F: [phone]      Physical Therapy Daily Treatment Note    Date:  10/20/2021  Patient Name:  Cha Estrada     :  1945  MRN: [id number]  Physician: Dr. Kaycee Phillips/Dr. Ej Martines: Medicare  Medical Diagnosis: Jesse knee arthritis/LBP                       Rehab Codes: M25.561, M25.562, M25.662, M25.661, R26.89, M54.5  Onset date: 2011                         Next 's appt.:  John Bennett;  Navdeep Robles  Visit# / total visits: ; Progress note for Medicare patient due at visit 10     Cancels/No Shows: 0/0    Subjective: Pt arrives this afternoon mentioning that her R knee is giving her some increased pain this morning but otherwise doing well. More fatigued following her previous visit.    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  Exercises:  Exercise Reps/ Time Weight/ Level  HEP Completed Comments   Scifit 10'  L2.0                      Supine             Quad sets    x      SAQ 25x 4 lbs x      SLR  2x10   x      Bridges 20x orange x x     Sitting              LAQ 25x 4 lbs x      SL        Hip abduction 10x orange  x    Clams x10  orange    x     Gym           Sit to stands 10x2  * X    Step ups 20x ea 4\" * X Posterior and lateral   3 way hip 10x ea orange * X    TKE 20x3\" ea blue      Calf stretch on SB 3x30\"  L5   X    Stool hip flexor  3x20\"     x      Lunges FW  10x    * X     Marches 10x    * X     Other:       Specific Instructions for next treatment:  1. Continue with basic quad and glute strengthening        Treatment Charges: Mins Units   []  Modalities     [x]  Ther Exercise 43 3   []  Manual Therapy     []  Ther Activities     []  Aquatics     []  Vasocompression     []  Other     Total Treatment time 43 3       Assessment: [x] Progressing toward goals. Added standing hip flexor stretch following Scifit warm up and continued with SB stretch. Added light resistance to sidelying therex to progress glut and lateral hip strengthening. Also instructed pt in standing 3 way hip with the addition of light resistance as well. Postural cueing provided with WB therex. Pt denied increased pain but fatigue present. [] No change. [] Other:  [x] Patient would continue to benefit from skilled physical therapy services in order to: increase L/R knee extension ROM and LE strength to decrease pain and improve function.         STG: (to be met in 10 treatments)  1. ? Pain: as her activity level increases  2. ? ROM: achieve 0 deg of ext  3. ? Strength: able to perform 2x10\"   4. ? Function: LEFI <40% interference  5. Patient to be independent with home exercise program as demonstrated by performance with correct form without cues. 6.    LTG: (to be met in 20 treatments)  1. LEFI <30% interference  2. Able to sit stand transfer out of a chair without use of her arms for strength                    Patient goals: \"strengthen muscles in knee and LB area\"    Pt. Education:  [x] Yes  [] No  [] Reviewed Prior HEP/Ed  Method of Education: [] Verbal  [x] Demo  [x] Written   Access Code: [email or web address]  URL: [email or web address]  Date: 10/15/2021  Prepared by: Viraj Caldwell Ben Gray    Exercises  Sit to Stand - 2 x daily - 7 x weekly - 2 sets - 10 reps  Lateral Step Up with Counter Support - 2 x daily - 7 x weekly - 2 sets - 10 reps  Forward Step Up with Counter Support - 2 x daily - 7 x weekly - 2 sets - 10 reps  Lunge with Counter Support - 2 x daily - 7 x weekly - 2 sets - 10 reps  Heel rises with counter support - 2 x daily - 7 x weekly - 2 sets - 10 reps  Standing Hip Abduction with Counter Support - 2 x daily - 7 x weekly - 2 sets - 10 reps  Standing Hip Extension with Counter Support - 2 x daily - 7 x weekly - 2 sets - 10 reps  Standing March with Counter Support - 2 x daily - 7 x weekly - 2 sets - 10 reps      Comprehension of Education:  [x] Verbalizes understanding. [x] Demonstrates understanding. [] Needs review. [] Demonstrates/verbalizes HEP/Ed previously given. Plan: [x] Continue current frequency toward long and short term goals.     [x] Specific Instructions for subsequent treatments: Cont per POC      Time In: 5019            Time Out: 9798    Electronically signed by:  Mary Anne Monsalve PTA

## 2021-10-22 ENCOUNTER — HOSPITAL ENCOUNTER (OUTPATIENT)
Dept: PHYSICAL THERAPY | Facility: CLINIC | Age: 76
Setting detail: THERAPIES SERIES
Discharge: HOME | End: 2021-10-22
Payer: MEDICARE

## 2021-10-22 PROCEDURE — 97110 THERAPEUTIC EXERCISES: CPT

## 2021-10-22 NOTE — FLOWSHEET NOTE
* X Posterior and lateral   3 way hip 10x ea orange * X    TKE 20x3\" ea blue      Calf stretch on SB 3x30\"  L5   X    Stool hip flexor  3x20\"     x      Lunges FW  10x    * X     Resisted stepping 2L orange  x    Marches 10x    * X     Other:       Specific Instructions for next treatment:  1. Continue with basic quad and glute strengthening        Treatment Charges: Mins Units   []  Modalities     [x]  Ther Exercise 43 3   []  Manual Therapy     []  Ther Activities     []  Aquatics     []  Vasocompression     []  Other     Total Treatment time 43 3       Assessment: [x] Progressing toward goals. Continued with stretches and mat strengthening therex as previous visit with good tolerance. Increased step height for step ups, increased UE support needed. Cueing for recall of 3 way hip required as well. Added resisted side stepping added to progress hip strength and improve posture. Pain symptoms not impacted with completion of treatment. [] No change. [] Other:  [x] Patient would continue to benefit from skilled physical therapy services in order to: increase L/R knee extension ROM and LE strength to decrease pain and improve function.         STG: (to be met in 10 treatments)  1. ? Pain: as her activity level increases  2. ? ROM: achieve 0 deg of ext  3. ? Strength: able to perform 2x10\"   4. ? Function: LEFI <40% interference  5. Patient to be independent with home exercise program as demonstrated by performance with correct form without cues. 6.    LTG: (to be met in 20 treatments)  1. LEFI <30% interference  2. Able to sit stand transfer out of a chair without use of her arms for strength                    Patient goals: \"strengthen muscles in knee and LB area\"    Pt. Education:  [x] Yes  [] No  [] Reviewed Prior HEP/Ed  Method of Education: [] Verbal  [x] Demo  [x] Written   Access Code: [email or web address]  URL: [email or web address]  Date: 10/15/2021  Prepared by: Kita Shirley    Exercises  Sit to Stand - 2 x daily - 7 x weekly - 2 sets - 10 reps  Lateral Step Up with Counter Support - 2 x daily - 7 x weekly - 2 sets - 10 reps  Forward Step Up with Counter Support - 2 x daily - 7 x weekly - 2 sets - 10 reps  Lunge with Counter Support - 2 x daily - 7 x weekly - 2 sets - 10 reps  Heel rises with counter support - 2 x daily - 7 x weekly - 2 sets - 10 reps  Standing Hip Abduction with Counter Support - 2 x daily - 7 x weekly - 2 sets - 10 reps  Standing Hip Extension with Counter Support - 2 x daily - 7 x weekly - 2 sets - 10 reps  Standing March with Counter Support - 2 x daily - 7 x weekly - 2 sets - 10 reps      Comprehension of Education:  [x] Verbalizes understanding. [x] Demonstrates understanding. [] Needs review. [] Demonstrates/verbalizes HEP/Ed previously given. Plan: [x] Continue current frequency toward long and short term goals.     [x] Specific Instructions for subsequent treatments: Cont per POC      Time In: 1000            Time Out: 4213    Electronically signed by:  Prieto Ferris PTA

## 2021-10-22 NOTE — PRE-CERTIFICATION NOTE
Medicare Cap   [x] Physical Therapy  [] Speech Therapy  [] Occupational therapy  *PT and Speech caps combine      $2100 Limit for PT and Speech combined  $2100 Limit for OT individually  At the beginning of the month where you expect to go over $2100, please add the [address] [place] [address] modifier      Patient Name: Nelida Heard: 1945    Note:  This is an estimate of charges billed.      Date of [address] Name # units/ charge $$$ charge Daily Total Charge Ongoing Total $$$   9/27/21 Eval+ TE 1+1 98.01+22.84 120.85 120.85   9/30/21 TE  3  29.21+22.84+22.84  74.89 195.84   10/4/21 TE 3  74.89 270.73   10/6/21 TE 3  74.89 345.62   10/8/21 TE 3  74.89 420.51   10/11/21 TE 3  74.89 495.40   10/13/21 TE 3  74.89 570.29   10/15/21 te 3  74.89 645.18   10/20/21 TE 3  74.89 720.07   10/22/21 TE 3  74.89 794.96

## 2021-10-25 ENCOUNTER — HOSPITAL ENCOUNTER (OUTPATIENT)
Dept: PHYSICAL THERAPY | Facility: CLINIC | Age: 76
Setting detail: THERAPIES SERIES
Discharge: HOME | End: 2021-10-25
Payer: MEDICARE

## 2021-10-25 PROCEDURE — 97110 THERAPEUTIC EXERCISES: CPT

## 2021-10-25 NOTE — FLOWSHEET NOTE
[] [facility]) - [facility]  [facility]  [address]  [phone]  F: [phone] [] [address]  [place] [address]   [address]  P: [phone]  F: [phone] [x] [address] &  Therapy  [address]  P: [phone]  F: [phone] [] [address]  P: [phone]  F: [phone] [] [address]  [facility]   [address]   [place]: [phone]  F: [phone]      Physical Therapy Daily Treatment Note    Date:  10/25/2021  Patient Name:  Elodia Sunshine      :  1945  MRN: [id number]  Physician: Dr. Conchita Phillips/Dr. Kaitlyn Blas: Medicare  Medical Diagnosis: Jesse knee arthritis/LBP                          Rehab Codes: M25.561, M25.562, M25.662, M25.661, R26.89, M54.5  Onset date: 2011                          Next 's appt.:  Jennifer ;  Sarahi Mckinley  Visit# / total visits: ; Progress note for Medicare patient due at visit 17     Cancels/No Shows: 0/0    Subjective: Pt with no issues at this time. States that she is doing well.      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  Exercises:  Exercise Reps/ Time Weight/ Level  HEP Completed Comments   Scifit 10'  L2.0                      Supine             Quad sets    x      SAQ 25x 4 lbs x      SLR  2x10   x      Bridges 20x orange x x     Sitting              LAQ 25x 4 lbs x      SL        Hip abduction x15 orange  x    Clam x15 orange    x     Gym             Sit to stands 10x2   x    Step ups 10x ea 6\"  x Posterior and lateral   3 way hip 10x ea orange x x TKE 20x3\" ea blue      Calf stretch on SB 3x30\"  L5   x    Stool hip flexor  3x20\"     x      Lunges FW  10x   x x     Resisted stepping 2L orange  x    Marches 10x   x x     Other:       Specific Instructions for next treatment:  1. Continue with basic quad and glute strengthening        Treatment Charges: Mins Units   []  Modalities     [x]  Ther Exercise 43 3   []  Manual Therapy     []  Ther Activities     []  Aquatics     []  Vasocompression     []  Other     Total Treatment time 43 3       Assessment: [x] Progressing toward goals. Continued with strengthening therex to improve LE function and stability. Good tolerance and good recall of therex. Standing therex completed with UE support as needed. Provided with updated HEP of standing therex to complete and add to current HEP. Pt notes a slight increase in L knee pain with step ups but decreased with cueing to correct positioning of foot. [] No change. [] Other:  [x] Patient would continue to benefit from skilled physical therapy services in order to: increase L/R knee extension ROM and LE strength to decrease pain and improve function.         STG: (to be met in 10 treatments)  1. ? Pain: as her activity level increases  2. ? ROM: achieve 0 deg of ext  3. ? Strength: able to perform 2x10\"   4. ? Function: LEFI <40% interference  5. Patient to be independent with home exercise program as demonstrated by performance with correct form without cues. 6.    LTG: (to be met in 20 treatments)  1. LEFI <30% interference  2. Able to sit stand transfer out of a chair without use of her arms for strength                    Patient goals: \"strengthen muscles in knee and LB area\"    Pt. Education:  [x] Yes  [] No  [] Reviewed Prior HEP/Ed  Method of Education: [] Verbal  [x] Demo  [x] Written   Access Code: [email or web address]  URL: [email or web address]  Date: 10/15/2021  Prepared by: Jose Angel Falcon    Exercises  Sit to Stand - 2 x daily - 7 x weekly - 2 sets - 10 reps  Lateral Step Up with Counter Support - 2 x daily - 7 x weekly - 2 sets - 10 reps  Forward Step Up with Counter Support - 2 x daily - 7 x weekly - 2 sets - 10 reps  Lunge with Counter Support - 2 x daily - 7 x weekly - 2 sets - 10 reps  Heel rises with counter support - 2 x daily - 7 x weekly - 2 sets - 10 reps  Standing Hip Abduction with Counter Support - 2 x daily - 7 x weekly - 2 sets - 10 reps  Standing Hip Extension with Counter Support - 2 x daily - 7 x weekly - 2 sets - 10 reps  Standing March with Counter Support - 2 x daily - 7 x weekly - 2 sets - 10 reps      Comprehension of Education:  [x] Verbalizes understanding. [x] Demonstrates understanding. [] Needs review. [] Demonstrates/verbalizes HEP/Ed previously given. Plan: [x] Continue current frequency toward long and short term goals.     [x] Specific Instructions for subsequent treatments: Cont per POC      Time In: 1000            Time Out: 7715    Electronically signed by:  Lily Quiles PTA

## 2021-10-28 ENCOUNTER — APPOINTMENT (OUTPATIENT)
Dept: PHYSICAL THERAPY | Facility: CLINIC | Age: 76
End: 2021-10-28
Payer: MEDICARE

## 2021-10-29 ENCOUNTER — HOSPITAL ENCOUNTER (OUTPATIENT)
Dept: PHYSICAL THERAPY | Facility: CLINIC | Age: 76
Setting detail: THERAPIES SERIES
Discharge: HOME | End: 2021-10-29
Payer: MEDICARE

## 2021-10-29 PROCEDURE — 97110 THERAPEUTIC EXERCISES: CPT

## 2021-10-29 NOTE — FLOWSHEET NOTE
[] [facility]) - [facility] &  Therapy  [address]  [phone]  F: [phone] [] [address]  [address] [facility] [address]   [address]  P: [phone]  F: [phone] [x] [address] &  Therapy  [address] [facility]  P: [phone]  F: [phone] [] [address]  P: [phone]  F: [phone] [] [address]  [facility]   [address]   [place]: [phone]  F: [phone]      Physical Therapy Daily Treatment Note    Date:  10/29/2021  Patient Name:  Alan Cr      :  1945  MRN: [id number]  Physician: Dr. Suzanne Phillips/Dr. Garfield Marquez: Medicare  Medical Diagnosis: Jesse knee arthritis/LBP                          Rehab Codes: M25.561, M25.562, M25.662, M25.661, R26.89, M54.5  Onset date: 2011                          Next Dr's appt.:  Gracie Ghotra;  Grace Shen  Visit# / total visits: ; Progress note for Medicare patient due at visit 17     Cancels/No Shows: 0/0    Subjective: Pt states that she is doing better as compared to yesterday which she originally had her appointment.       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  Exercises:  Exercise Reps/ Time Weight/ Level  HEP Completed Comments   Scifit 10'  L2.0                      Supine             Quad sets    x      SAQ 25x 4 lbs x      SLR  2x10   x      SKTC 3x20\"   x    LTR 10x5\"   x    Bridges 20x orange x x     Sitting              LAQ 25x 4 lbs x      SL        Hip abduction x15 orange  x    Clam x15 orange    x     Gym             Sit to stands 10x2   x Step ups 10x ea 6\"  x Posterior and lateral   3 way hip 15x ea orange x x    TKE 20x3\" ea blue      Calf stretch on SB 3x30\"  L5   x    Stool hip flexor  3x20\"     x      Lunges FW  10x   x x     Resisted stepping 3L orange  x    Marches/HS curls 15x 3# x x     Other:       Specific Instructions for next treatment:  1. Continue with basic quad and glute strengthening        Treatment Charges: Mins Units   []  Modalities     [x]  Ther Exercise 45 3   []  Manual Therapy     []  Ther Activities     []  Aquatics     []  Vasocompression     []  Other     Total Treatment time 45 3       Assessment: [x] Progressing toward goals. Good tolerance to strengthening and stretching completed this date. Added supine LTR and SKTC to aide in further progression of LE ROM, good tolerance. Postural cueing still needed throughout treatment as pt tends to be in a flexed position. Fatigued with standing therex this date, more than previous visits. [] No change. [] Other:  [x] Patient would continue to benefit from skilled physical therapy services in order to: increase L/R knee extension ROM and LE strength to decrease pain and improve function.         STG: (to be met in 10 treatments)  1. ? Pain: as her activity level increases  2. ? ROM: achieve 0 deg of ext  3. ? Strength: able to perform 2x10\"   4. ? Function: LEFI <40% interference  5. Patient to be independent with home exercise program as demonstrated by performance with correct form without cues. 6.    LTG: (to be met in 20 treatments)  1. LEFI <30% interference  2. Able to sit stand transfer out of a chair without use of her arms for strength                    Patient goals: \"strengthen muscles in knee and LB area\"    Pt. Education:  [x] Yes  [] No  [] Reviewed Prior HEP/Ed  Method of Education: [] Verbal  [x] Demo  [x] Written   Access Code: [email or web address]  URL: [email or web address]  Date: 10/15/2021  Prepared by: Loraine Montes    Exercises  Sit to Stand - 2 x daily - 7 x weekly - 2 sets - 10 reps  Lateral Step Up with Counter Support - 2 x daily - 7 x weekly - 2 sets - 10 reps  Forward Step Up with Counter Support - 2 x daily - 7 x weekly - 2 sets - 10 reps  Lunge with Counter Support - 2 x daily - 7 x weekly - 2 sets - 10 reps  Heel rises with counter support - 2 x daily - 7 x weekly - 2 sets - 10 reps  Standing Hip Abduction with Counter Support - 2 x daily - 7 x weekly - 2 sets - 10 reps  Standing Hip Extension with Counter Support - 2 x daily - 7 x weekly - 2 sets - 10 reps  Standing March with Counter Support - 2 x daily - 7 x weekly - 2 sets - 10 reps      Comprehension of Education:  [x] Verbalizes understanding. [x] Demonstrates understanding. [] Needs review. [] Demonstrates/verbalizes HEP/Ed previously given. Plan: [x] Continue current frequency toward long and short term goals.     [x] Specific Instructions for subsequent treatments: Cont per POC      Time In: 1300            Time Out: 9027    Electronically signed by:  Mary Anne Monsalve PTA

## 2021-11-01 ENCOUNTER — HOSPITAL ENCOUNTER (OUTPATIENT)
Dept: PHYSICAL THERAPY | Facility: CLINIC | Age: 76
Setting detail: THERAPIES SERIES
Discharge: HOME | End: 2021-11-01
Payer: MEDICARE

## 2021-11-01 NOTE — FLOWSHEET NOTE
[] [facility]) [facility] &  Therapy  [address]    [phone]  F: [phone]   [] [address]  [facility]   [address]  P: [phone]  F: [phone]  [] [place]  [address]  P: [phone]  F: [phone] [] [address]  P: [phone]  F: [phone]  [] [address]  [address] [facility] [address]   [address]   [place]: [phone]  F: [phone]   [] [facility]  [address] [facility] [address]  [place]: [phone]   F: [phone]     Physical Therapy Cancel/No Show note    Date: 2021  Patient: Pro Romo  : 1945  MRN: [id number]    Cancels/No Shows to date:     For today's appointment patient:    [x]  Cancelled    [] Rescheduled appointment    [] No-show     Reason given by patient:    []  Patient ill    []  Conflicting appointment    [] No transportation      [] Conflict with work    [] No reason given    [] Weather related    [] COVID-19    [] Other:      Comments:  Pt has dr jonas      [] Next appointment was confirmed    Electronically signed by: Mayra Upton

## 2021-11-03 ENCOUNTER — HOSPITAL ENCOUNTER (OUTPATIENT)
Dept: PHYSICAL THERAPY | Facility: CLINIC | Age: 76
Setting detail: THERAPIES SERIES
Discharge: HOME | End: 2021-11-03
Payer: MEDICARE

## 2021-11-03 PROCEDURE — 97110 THERAPEUTIC EXERCISES: CPT

## 2021-11-03 NOTE — FLOWSHEET NOTE
[] [facility]) - [facility] &  Therapy  [address]  [phone]  F: [phone] [] [address]  [place] [address]   [address]  P: [phone]  F: [phone] [x] [address] &  Therapy  [address] [facility] [address]  P: [phone]  F: [phone] [] [address]  P: [phone]  F: [phone] [] [address]  [facility]   [address]   [place]: [phone]  F: [phone]      Physical Therapy Daily Treatment Note    Date:  11/3/2021  Patient Name:  Nguyen Collins      :  1945  MRN: [id number]  Physician: Dr. Stacy Phillips/Dr. Elicia Smith: Medicare  Medical Diagnosis: Jesse knee arthritis/LBP                          Rehab Codes: M25.561, M25.562, M25.662, M25.661, R26.89, M54.5  Onset date: 2011                          Next Dr's appt.:  Oz Lockett;  Evonne Grey  Visit# / total visits: ; Progress note for Medicare patient due at visit 17     Cancels/No Shows: 1/0    Subjective: Pt arrives this morning mentioning that she had some issues over the weekend and needed to see her Dr this past Monday and that's why she had to cx her appointment. Feeling a little fatigued this morning. Knee pain not changed.        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  Exercises:  Exercise Reps/ Time Weight/ Level  HEP Completed Comments   Scifit 10'  L2.0                      Supine             Quad sets    x      SAQ 25x 4 lbs x      SLR  2x10   x      SKTC 5x10\"   x    LTR 10x5\"   x    Bridges 20x orange x x     Sitting            LAQ 25x 4 lbs x      SL        Hip abduction x20 orange  x    Clam x20 orange    x     Gym             Sit to stands 10x2   x    Step ups 10x ea 6\"  x Posterior and lateral   3 way hip 15x ea orange x     TKE 20x3\" ea blue      Calf stretch on SB 3x30\"  L5   x    Stool hip flexor  3x20\"           Lunges FW  10x   x x     Resisted stepping 3L orange  x    Marches/HS curls 15x 3# x      Other:       Specific Instructions for next treatment:  1. Continue with basic quad and glute strengthening        Treatment Charges: Mins Units   []  Modalities     [x]  Ther Exercise 40 3   []  Manual Therapy     []  Ther Activities     []  Aquatics     []  Vasocompression     []  Other     Total Treatment time 40 3       Assessment: [x] Progressing toward goals. Completed charted therex as noted within time frame d/t pt running about 15 minutes late for her appointment this morning. Increased reps with mat therex to progress hip strength, provided pt with tband resistance band to add to HEP as well. Pt demonstrating increased fatigue with progression through treatment this date. Pain increases as well if she allows her legs to twist with lunging and stepping therex ex. [] No change. [] Other:  [x] Patient would continue to benefit from skilled physical therapy services in order to: increase L/R knee extension ROM and LE strength to decrease pain and improve function.         STG: (to be met in 10 treatments)  1. ? Pain: as her activity level increases  2. ? ROM: achieve 0 deg of ext  3. ? Strength: able to perform 2x10\"   4. ? Function: LEFI <40% interference  5. Patient to be independent with home exercise program as demonstrated by performance with correct form without cues. 6.    LTG: (to be met in 20 treatments)  1. LEFI <30% interference  2.  Able to sit stand transfer out of a chair without use of her arms for strength                    Patient goals: \"strengthen muscles in knee and LB area\"    Pt. Education:  [x] Yes  [] No  [] Reviewed Prior HEP/Ed  Method of Education: [] Verbal  [x] Demo  [x] Written   Access Code: [email or web address]  URL: [email or web address]  Date: 10/15/2021  Prepared by: Cheri Maya    Exercises  Sit to Stand - 2 x daily - 7 x weekly - 2 sets - 10 reps  Lateral Step Up with Counter Support - 2 x daily - 7 x weekly - 2 sets - 10 reps  Forward Step Up with Counter Support - 2 x daily - 7 x weekly - 2 sets - 10 reps  Lunge with Counter Support - 2 x daily - 7 x weekly - 2 sets - 10 reps  Heel rises with counter support - 2 x daily - 7 x weekly - 2 sets - 10 reps  Standing Hip Abduction with Counter Support - 2 x daily - 7 x weekly - 2 sets - 10 reps  Standing Hip Extension with Counter Support - 2 x daily - 7 x weekly - 2 sets - 10 reps  Standing March with Counter Support - 2 x daily - 7 x weekly - 2 sets - 10 reps      Comprehension of Education:  [x] Verbalizes understanding. [x] Demonstrates understanding. [] Needs review. [] Demonstrates/verbalizes HEP/Ed previously given. Plan: [x] Continue current frequency toward long and short term goals.     [x] Specific Instructions for subsequent treatments: Cont per POC      Time In: 2343            Time Out: 1603    Electronically signed by:  Zehra Perla PTA

## 2021-11-05 ENCOUNTER — HOSPITAL ENCOUNTER (OUTPATIENT)
Dept: PHYSICAL THERAPY | Facility: CLINIC | Age: 76
Setting detail: THERAPIES SERIES
Discharge: HOME | End: 2021-11-05
Payer: MEDICARE

## 2021-11-05 PROCEDURE — 97110 THERAPEUTIC EXERCISES: CPT

## 2021-11-05 NOTE — FLOWSHEET NOTE
[] [facility]) - [facility]  [facility]  [address]  [phone]  F: [phone] [] [address]  [address]   [address]  P: [phone]  F: [phone] [x] [address] &  Therapy  [address]  P: [phone]  F: [phone] [] [address]  P: [phone]  F: [phone] [] [address]  [facility]   [address]   [place]: [phone]  F: [phone]      Physical Therapy Daily Treatment Note    Date:  2021  Patient Name:  Flako Beasley      :  1945  MRN: [id number]  Physician: Dr. Roxy Phillips/Dr. Yan Cone: Medicare  Medical Diagnosis: Jesse knee arthritis/LBP                          Rehab Codes: M25.561, M25.562, M25.662, M25.661, R26.89, M54.5  Onset date: 2011                          Next Dr's appt.:  Anni Kearney;  Stephani Hernandez  Visit# / total visits: ; Progress note for Medicare patient due at visit 17     Cancels/No Shows: 1/0    Subjective: Pt with no new issues to report at this time.        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  Exercises:  Exercise Reps/ Time Weight/ Level  HEP Completed Comments   Scifit 10'  L2.0                      Supine             Quad sets    x      SAQ 25x 4 lbs x      SLR  2x10   x      SKTC 5x10\"   x    LTR 10x5\"   x    Bridges 20x orange x x     Sitting              LAQ 25x 4 lbs x      SL        Hip abduction x20 orange  x    Clam x20 orange    x     Gym             Sit to stands 10x2   x    Step ups 15x ea 6\"  x Posterior and lateral   3 way Support - 2 x daily - 7 x weekly - 2 sets - 10 reps  Forward Step Up with Counter Support - 2 x daily - 7 x weekly - 2 sets - 10 reps  Lunge with Counter Support - 2 x daily - 7 x weekly - 2 sets - 10 reps  Heel rises with counter support - 2 x daily - 7 x weekly - 2 sets - 10 reps  Standing Hip Abduction with Counter Support - 2 x daily - 7 x weekly - 2 sets - 10 reps  Standing Hip Extension with Counter Support - 2 x daily - 7 x weekly - 2 sets - 10 reps  Standing March with Counter Support - 2 x daily - 7 x weekly - 2 sets - 10 reps      Comprehension of Education:  [x] Verbalizes understanding. [x] Demonstrates understanding. [] Needs review. [] Demonstrates/verbalizes HEP/Ed previously given. Plan: [x] Continue current frequency toward long and short term goals.     [x] Specific Instructions for subsequent treatments: Cont per POC      Time In: 1005            Time Out: 8303    Electronically signed by:  Yuri Mir PTA

## 2021-11-08 ENCOUNTER — HOSPITAL ENCOUNTER (OUTPATIENT)
Dept: PHYSICAL THERAPY | Facility: CLINIC | Age: 76
Setting detail: THERAPIES SERIES
Discharge: HOME | End: 2021-11-08
Payer: MEDICARE

## 2021-11-08 PROCEDURE — 97110 THERAPEUTIC EXERCISES: CPT

## 2021-11-08 NOTE — FLOWSHEET NOTE
[] [facility]) - [facility] &  Therapy  [address]  [phone]  F: [phone] [] [address]  [address] [employer] [address]   [address]  P: [phone]  F: [phone] [x] [address] &  Therapy  [address] [facility]  P: [phone]  F: [phone] [] [address]  P: [phone]  F: [phone] [] [address]  [facility]   [address]   [place]: [phone]  F: [phone]      Physical Therapy Daily Treatment Note    Date:  2021  Patient Name:  Raine Snyder      :  1945  MRN: [id number]  Physician: Dr. Cornelio Phillips/Dr. Mike Barton: Medicare  Medical Diagnosis: Jesse knee arthritis/LBP                          Rehab Codes: M25.561, M25.562, M25.662, M25.661, R26.89, M54.5  Onset date: 2011                          Next Dr's appt.:  Tran Carballo;  Gerardo Gaviria  Visit# / total visits: 15/20; Progress note for Medicare patient due at visit 17     Cancels/No Shows: 1/0    Subjective: Did a lot of standing yesterday while cooking but did not have increased pain or soreness in the knees. Still experiencing some back stiffness and takes a little bit to \"straighten up. \"        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  Exercises:  Exercise Reps/ Time Weight/ Level  HEP Completed Comments   Scifit 10'  L2.0                      Supine             Quad sets    x      SAQ 25x 4 lbs x      SLR  2x10   x      SKTC 5x10\"   x    LTR 10x5\"   x    Bridges 20x orange x x     Sitting              LAQ 25x 4 lbs x      SL        Hip abduction x20 orange  x    Clam x20 orange    x     Gym             Sit to stands 10x2   x    Step ups 15x ea 6\"  x Posterior and lateral   3 way hip 10x ea lime x x    Calf stretch on SB 3x30\"  L5   x    Stool hip flexor  3x20\"           Lunges FW  15x   x x     Resisted stepping 3L lime  x    Marching 3L // bars   x     Other:       Specific Instructions for next treatment:  1. Continue with basic quad and glute strengthening        Treatment Charges: Mins Units   []  Modalities     [x]  Ther Exercise 40 3   []  Manual Therapy     []  Ther Activities     []  Aquatics     []  Vasocompression     []  Other     Total Treatment time 40 3       Assessment: [x] Progressing toward goals. Strengthening therex completed as noted above. Fatigues with progression through treatment. Demonstrating improved recall of therex, less cueing for technique. Able to complete standing therex with less rest breaks. Able to increase resistance with 3 way hip. Does demo increased flexed posture with fatigue. [] No change. [] Other:  [x] Patient would continue to benefit from skilled physical therapy services in order to: increase L/R knee extension ROM and LE strength to decrease pain and improve function.         STG: (to be met in 10 treatments)  1. ? Pain: as her activity level increases  2. ? ROM: achieve 0 deg of ext  3. ? Strength: able to perform 2x10\"   4. ? Function: LEFI <40% interference  5. Patient to be independent with home exercise program as demonstrated by performance with correct form without cues. 6.    LTG: (to be met in 20 treatments)  1. LEFI <30% interference  2. Able to sit stand transfer out of a chair without use of her arms for strength                    Patient goals: \"strengthen muscles in knee and LB area\"    Pt. Education:  [x] Yes  [] No  [] Reviewed Prior HEP/Ed  Method of Education: [] Verbal  [x] Demo  [x] Written   Access Code: [email or web address]  URL: [email or web address]  Date: 10/15/2021  Prepared by: Heather Luevano    Exercises  Sit to Stand - 2 x daily - 7 x weekly - 2 sets - 10 reps  Lateral Step Up with Counter Support - 2 x daily - 7 x weekly - 2 sets - 10 reps  Forward Step Up with Counter Support - 2 x daily - 7 x weekly - 2 sets - 10 reps  Lunge with Counter Support - 2 x daily - 7 x weekly - 2 sets - 10 reps  Heel rises with counter support - 2 x daily - 7 x weekly - 2 sets - 10 reps  Standing Hip Abduction with Counter Support - 2 x daily - 7 x weekly - 2 sets - 10 reps  Standing Hip Extension with Counter Support - 2 x daily - 7 x weekly - 2 sets - 10 reps  Standing March with Counter Support - 2 x daily - 7 x weekly - 2 sets - 10 reps      Comprehension of Education:  [x] Verbalizes understanding. [x] Demonstrates understanding. [] Needs review. [] Demonstrates/verbalizes HEP/Ed previously given. Plan: [x] Continue current frequency toward long and short term goals.     [x] Specific Instructions for subsequent treatments: Cont per POC      Time In: 1000            Time Out: 8074    Electronically signed by:  Marsha Schilder, PTA

## 2021-11-08 NOTE — PRE-CERTIFICATION NOTE
Medicare Cap   [x] Physical Therapy  [] Speech Therapy  [] Occupational therapy  *PT and Speech caps combine      $2100 Limit for PT and Speech combined  $2100 Limit for OT individually  At the beginning of the month where you expect to go over $2100, please add the [address] [place] [address] modifier      Patient Name: Rosana Regalado: 1945    Note:  This is an estimate of charges billed.      Date of [address] Name # units/ charge $$$ charge Daily Total Charge Ongoing Total $$$   9/27/21 Eval+ TE 1+1 98.01+22.84 120.85 120.85   9/30/21 TE  3  29.21+22.84+22.84  74.89 195.84   10/4/21 TE 3  74.89 270.73   10/6/21 TE 3  74.89 345.62   10/8/21 TE 3  74.89 420.51   10/11/21 TE 3  74.89 495.40   10/13/21 TE 3  74.89 570.29   10/15/21 te 3  74.89 645.18   10/20/21 TE 3  74.89 720.07   10/22/21 TE 3  74.89 794.96   10/25/21 TE 3  74.89 869.85   10/29/21 TE 3  74.89 944.74   11/03 TE 3  74.89 1019.63   11/05 TE 3  74.89 1094.52   11/8 TE 3  74.89 1169.41

## 2021-11-12 ENCOUNTER — HOSPITAL ENCOUNTER (OUTPATIENT)
Dept: PHYSICAL THERAPY | Facility: CLINIC | Age: 76
Setting detail: THERAPIES SERIES
Discharge: HOME | End: 2021-11-12
Payer: MEDICARE

## 2021-11-12 PROCEDURE — 97110 THERAPEUTIC EXERCISES: CPT

## 2021-11-12 NOTE — PRE-CERTIFICATION NOTE
Medicare Cap   [x] Physical Therapy  [] Speech Therapy  [] Occupational therapy  *PT and Speech caps combine      $2100 Limit for PT and Speech combined  $2100 Limit for OT individually  At the beginning of the month where you expect to go over $2100, please add the [address] [place] modifier      Patient Name: Shena Mustafa: 1945    Note:  This is an estimate of charges billed.      Date of [address] Name # units/ charge $$$ charge Daily Total Charge Ongoing Total $$$   9/27/21 Eval+ TE 1+1 98.01+22.84 120.85 120.85   9/30/21 TE  3  29.21+22.84+22.84  74.89 195.84   10/4/21 TE 3  74.89 270.73   10/6/21 TE 3  74.89 345.62   10/8/21 TE 3  74.89 420.51   10/11/21 TE 3  74.89 495.40   10/13/21 TE 3  74.89 570.29   10/15/21 te 3  74.89 645.18   10/20/21 TE 3  74.89 720.07   10/22/21 TE 3  74.89 794.96   10/25/21 TE 3  74.89 869.85   10/29/21 TE 3  74.89 944.74   11/03 TE 3  74.89 1019.63   11/05 TE 3  74.89 1094.52   11/8 TE 3  74.89 1169.41   11/12 TE 3  74.89 1244.30

## 2021-11-12 NOTE — PRE-CERTIFICATION NOTE
Medicare Cap   [x] Physical Therapy  [] Speech Therapy  [] Occupational therapy  *PT and Speech caps combine      $2100 Limit for PT and Speech combined  $2100 Limit for OT individually  At the beginning of the month where you expect to go over $2100, please add the [address] [place] 22 modifier      Patient Name: Janet Prom: 1945    Note:  This is an estimate of charges billed.      Date of [address] Name # units/ charge $$$ charge Daily Total Charge Ongoing Total $$$   9/27/21 Eval+ TE 1+1 98.01+22.84 120.85 120.85   9/30/21 TE  3  29.21+22.84+22.84  74.89 195.84   10/4/21 TE 3  74.89 270.73   10/6/21 TE 3  74.89 345.62   10/8/21 TE 3  74.89 420.51   10/11/21 TE 3  74.89 495.40   10/13/21 TE 3  74.89 570.29   10/15/21 te 3  74.89 645.18   10/20/21 TE 3  74.89 720.07   10/22/21 TE 3  74.89 794.96   10/25/21 TE 3  74.89 869.85   10/29/21 TE 3  74.89 944.74   11/03 TE 3  74.89 1019.63   11/05 TE 3  74.89 1094.52   11/8 TE 3  74.89 1169.41   11/12 TE 3  74.89 1244.30

## 2021-11-12 NOTE — FLOWSHEET NOTE
[] [address] TWELVE-STEP [facility] &  Therapy  [address]  [phone]  F: [phone] [] [address]  [address]   [address]  P: [phone]  F: [phone] [x] [address] &  Therapy  [address] [facility]  P: [phone]  F: [phone] [] [address]  P: [phone]  F: [phone] [] [address]  [facility]   [address]   [place]: [phone]  F: [phone]      Physical Therapy Daily Treatment Note    Date:  2021  Patient Name:  Giselle Burroughs      :  1945  MRN: [id number]  Physician: Dr. Luz Phillips/Dr. Martín Whitley: Medicare  Medical Diagnosis: Jesse knee arthritis/LBP                          Rehab Codes: M25.561, M25.562, M25.662, M25.661, R26.89, M54.5  Onset date: 2011                          Next 's appt.:  Brian Ochoa;  [facility]  Visit# / total visits: ; Progress note for Medicare patient due at visit 17     Cancels/No Shows: 1/0    Subjective: pt stating knees are feeling a little sore this morning. Pt noting she felt tired after last visit.       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  Exercises:  Exercise Reps/ Time Weight/ Level  HEP Completed Comments   Scifit 10'  L2.0                      Supine             Quad sets    x      SAQ 25x 4 lbs x      SLR  2x10   x      SKTC 5x10\"   x    LTR 10x5\"   x    Bridges 25x orange x x     Sitting              LAQ 25x 4 lbs x      SL        Hip abduction x25 orange  x    Clamshells x25 orange    x     Gym             Sit to stands 10x2 x    Step ups 2x10 ea 6\"  x Posterior and lateral   3 way hip 10x ea lime x x    Calf stretch on SB 3x30\"  L5   x    Stool hip flexor  3x20\"           Lunges FW  15x   x x     Resisted stepping 3L lime  x    Marching 3L // bars   x     Other:       Specific Instructions for next treatment:  1. Continue with basic quad and glute strengthening        Treatment Charges: Mins Units   []  Modalities     [x]  Ther Exercise 40 3   []  Manual Therapy     []  Ther Activities     []  Aquatics     []  Vasocompression     []  Other     Total Treatment time 40 3       Assessment: [x] Progressing toward goals. 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 Pt continues to note muscle fatigue but that she feels she is improving. Pt displays good exercise recall and not needing cues for proper form this date. Some pain noted in R knee during lunges and step ups. [] No change. [] Other:  [x] Patient would continue to benefit from skilled physical therapy services in order to: increase L/R knee extension ROM and LE strength to decrease pain and improve function.         STG: (to be met in 10 treatments)  1. ? Pain: as her activity level increases  2. ? ROM: achieve 0 deg of ext  3. ? Strength: able to perform 2x10\"   4. ? Function: LEFI <40% interference  5. Patient to be independent with home exercise program as demonstrated by performance with correct form without cues. 6.    LTG: (to be met in 20 treatments)  1. LEFI <30% interference  2. Able to sit stand transfer out of a chair without use of her arms for strength                    Patient goals: \"strengthen muscles in knee and LB area\"    Pt. Education:  [x] Yes  [] No  [] Reviewed Prior HEP/Ed  Method of Education: [] Verbal  [x] Demo  [x] Written   Access Code: [email or web address]  URL: [email or web address]  Date: 10/15/2021  Prepared by: Eloy Gramajo    Exercises  Sit to Stand - 2 x daily - 7 x weekly - 2 sets - 10 reps  Lateral Step Up with Counter Support - 2 x daily - 7 x weekly - 2 sets - 10 reps  Forward Step Up with Counter Support - 2 x daily - 7 x weekly - 2 sets - 10 reps  Lunge with Counter Support - 2 x daily - 7 x weekly - 2 sets - 10 reps  Heel rises with counter support - 2 x daily - 7 x weekly - 2 sets - 10 reps  Standing Hip Abduction with Counter Support - 2 x daily - 7 x weekly - 2 sets - 10 reps  Standing Hip Extension with Counter Support - 2 x daily - 7 x weekly - 2 sets - 10 reps  Standing March with Counter Support - 2 x daily - 7 x weekly - 2 sets - 10 reps      Comprehension of Education:  [x] Verbalizes understanding. [x] Demonstrates understanding. [] Needs review. [] Demonstrates/verbalizes HEP/Ed previously given. Plan: [x] Continue current frequency toward long and short term goals.     [x] Specific Instructions for subsequent treatments: Cont per POC      Time In: 11:05 am            Time Out: 11:45am    Electronically signed by:  Heike Craig PTA

## 2021-11-15 ENCOUNTER — HOSPITAL ENCOUNTER (OUTPATIENT)
Dept: PHYSICAL THERAPY | Facility: CLINIC | Age: 76
Setting detail: THERAPIES SERIES
Discharge: HOME | End: 2021-11-15
Payer: MEDICARE

## 2021-11-15 PROCEDURE — 97110 THERAPEUTIC EXERCISES: CPT

## 2021-11-15 NOTE — PRE-CERTIFICATION NOTE
Medicare Cap   [x] Physical Therapy  [] Speech Therapy  [] Occupational therapy  *PT and Speech caps combine      $2100 Limit for PT and Speech combined  $2100 Limit for OT individually  At the beginning of the month where you expect to go over $2100, please add the [address] [place] [address] modifier      Patient Name: Yoavna Dallas: 1945    Note:  This is an estimate of charges billed.      Date of [address] Name # units/ charge $$$ charge Daily Total Charge Ongoing Total $$$   9/27/21 Eval+ TE 1+1 98.01+22.84 120.85 120.85   9/30/21 TE  3  29.21+22.84+22.84  74.89 195.84   10/4/21 TE 3  74.89 270.73   10/6/21 TE 3  74.89 345.62   10/8/21 TE 3  74.89 420.51   10/11/21 TE 3  74.89 495.40   10/13/21 TE 3  74.89 570.29   10/15/21 te 3  74.89 645.18   10/20/21 TE 3  74.89 720.07   10/22/21 TE 3  74.89 794.96   10/25/21 TE 3  74.89 869.85   10/29/21 TE 3  74.89 944.74   11/03 TE 3  74.89 1019.63   11/05 TE 3  74.89 1094.52   11/8 TE 3  74.89 1169.41   11/12 TE 3  74.89 1244.30   11/15 TE 3  74.89 1319.19

## 2021-11-15 NOTE — FLOWSHEET NOTE
[] [facility]) - [facility]  [facility]  [address]  [phone]  F: [phone] [] [address]  [address]   [address]  P: [phone]  F: [phone] [x] [address] &  Therapy  [address] [facility]  P: [phone]  F: [phone] [] [address]  P: [phone]  F: [phone] [] [address]  [facility]   [address]   [place]: [phone]  F: [phone]      Physical Therapy Daily TreatmentProgress Note    Date:  11/15/2021  Patient Name:  Wilfredo Baldwin      :  1945  MRN: [id number]  Physician: Dr. Leila Phillips/Dr. Caitlin Johnson: Medicare  Medical Diagnosis: Jesse knee arthritis/LBP                          Rehab Codes: M25.561, M25.562, M25.662, M25.661, R26.89, M54.5  Onset date: 2011                          Next 's appt.:  Gilford Sievert;  Donny Hanna  Visit# / total visits: ; Progress note for Medicare patient due at visit 17     Cancels/No Shows: 1/0    Subjective: pt reporting no pain in knee's this date, however has antalgic gait pattern. Patient stated she was tired after last visit.   Pain:  [] Yes  [x] No Location: L/R knee Pain Rating: (0-10 scale) 0/10  Pain altered Tx:  [x] No  [] Yes  Action:  Comments:    Objective  Todays Treatment:  Modalities: Game Ready PRN  Precautions: severe OA in Jesse knees  Exercises:  Exercise Reps/ Time Weight/ Level  HEP Completed Comments   Scifit 10'  L2.0                      Supine             Quad sets    x      SAQ 25x 4 lbs x      SLR  2x10   x      SKTC 5x10\"       LTR 10x5\"       Bridges 25x orange x      Sitting              LAQ 25x 4 lbs x      SL        Hip abduction x25 orange      Clamshells x25 orange         Gym Sit to stands 10x2   x    Step ups 2x10 ea 6\"  x Posterior and lateral   3 way hip 10x ea lime x x    Calf stretch on SB 3x30\"  L5   x    Stool hip flexor  3x20\"           Lunges FW  15x   x x     Resisted stepping 3L lime  x    Marching 3L // bars   x     Other:       Specific Instructions for next treatment:  1. Continue with basic quad and glute strengthening        Treatment Charges: Mins Units   []  Modalities     [x]  Ther Exercise 40 3   []  Manual Therapy     []  Ther Activities     []  Aquatics     []  Vasocompression     []  Other     Total Treatment time 40 3       Assessment: [x] Progressing toward goals. Completed all standing exercises per ginny with fair tolerance. Pt displaying increased overall fatigue compared to previous treatments, but no increase in pain. Pt noting she feels pulse is higher than normal and \"feels all over the place\". Pt notes this is not normal and has noticed this happen more often the past two weeks. Pt was provided more rest breaks to help lower pulse and fatigue with pt able to complete exercises after rest break. Pt was seen by main PT after standing for review of goals and progress note. Due to status of her knees and the eventual need for TKAs per Dr. Curly Chu note and the chronic nature of her LBP, she has maximized the benefit of her clinic based therapy. At this point, she is independent with her HEP and will continue with her HEP at this point. [] No change. [] Other:  [x] Patient would continue to benefit from skilled physical therapy services in order to: increase L/R knee extension ROM and LE strength to decrease pain and improve function. STG: (to be met in 10 treatments)  ? Pain: as her activity level increases  ? ROM: achieve 0 deg of ext  ? Strength: able to perform 2x10\"   ? Function: LEFI <40% interference  Patient to be independent with home exercise program as demonstrated by performance with correct form without cues.      LTG: (to be met in 20 treatments)  LEFI <30% interference  Able to sit stand transfer out of a chair without use of her arms for strength                    Patient goals: \"strengthen muscles in knee and LB area\"    Pt. Education:  [x] Yes  [] No  [] Reviewed Prior HEP/Ed  Method of Education: [] Verbal  [x] Demo  [x] Written   Access Code: [email or web address]  URL: [email or web address]  Date: 10/15/2021  Prepared by: Eloy Gramajo    Exercises  Sit to Stand - 2 x daily - 7 x weekly - 2 sets - 10 reps  Lateral Step Up with Counter Support - 2 x daily - 7 x weekly - 2 sets - 10 reps  Forward Step Up with Counter Support - 2 x daily - 7 x weekly - 2 sets - 10 reps  Lunge with Counter Support - 2 x daily - 7 x weekly - 2 sets - 10 reps  Heel rises with counter support - 2 x daily - 7 x weekly - 2 sets - 10 reps  Standing Hip Abduction with Counter Support - 2 x daily - 7 x weekly - 2 sets - 10 reps  Standing Hip Extension with Counter Support - 2 x daily - 7 x weekly - 2 sets - 10 reps  Standing March with Counter Support - 2 x daily - 7 x weekly - 2 sets - 10 reps      Comprehension of Education:  [x] Verbalizes understanding. [x] Demonstrates understanding. [] Needs review. [] Demonstrates/verbalizes HEP/Ed previously given. Plan: [x] Continue current frequency toward long and short term goals. [x] Specific Instructions for subsequent treatments: Finish out her POC and DC to HEP.          Time In: 10:00 am            Time Out: 10:45 am    Electronically signed by:  Jose Raul Swanson PTA

## 2021-11-19 ENCOUNTER — HOSPITAL ENCOUNTER (OUTPATIENT)
Dept: PHYSICAL THERAPY | Facility: CLINIC | Age: 76
Setting detail: THERAPIES SERIES
Discharge: HOME | End: 2021-11-19
Payer: MEDICARE

## 2021-11-19 PROCEDURE — 97110 THERAPEUTIC EXERCISES: CPT

## 2021-11-19 NOTE — FLOWSHEET NOTE
[] [facility] TWELVE-STEP [facility] &  Therapy  [address]  [phone]  F: [phone] [] [address]  [facility] [address]   [address]  P: [phone]  F: [phone] [x] [address] &  Therapy  [address] [facility]  P: [phone]  F: [phone] [] [address]  P: [phone]  F: [phone] [] [address]  [facility]   [address]   [place]: [phone]  F: [phone]      Physical Therapy Daily TreatmentProgress Note    Date:  2021  Patient Name:  Roger Escobar      :  1945  MRN: [id number]  Physician: Dr. Tyra Phillips/Dr. Chari Rivera: Medicare  Medical Diagnosis: Jesse knee arthritis/LBP                          Rehab Codes: M25.561, M25.562, M25.662, M25.661, R26.89, M54.5  Onset date: 2011                          Next 's appt.:  Livan Jackson;  Roman Pedroza  Visit# / total visits: ; Progress note for Medicare patient due at visit 17     Cancels/No Shows: 1/0    Subjective: Pt arrives this morning stating that she has \" a little catch in her get up today. \"   Pain:  [] Yes  [x] No Location: L/R knee Pain Rating: (0-10 scale) 0/10  Pain altered Tx:  [x] No  [] Yes  Action:  Comments:    Objective:         Todays Treatment:  Modalities: Game Ready PRN  Precautions: severe OA in Jesse knees  Exercises:  Exercise Reps/ Time Weight/ Level  HEP Completed Comments   Scifit 10'  L2.0                      Supine             Quad sets    x      SAQ 25x 4 lbs x      SLR  2x10   x      SKTC 5x10\"       LTR 10x5\"       Bridges 25x orange x      Sitting              LAQ 25x 4 lbs x      SL        Hip abduction x25 orange      Clamshells x25 orange         Gym             Sit to stands 10x2   x    Step ups 2x10 ea 6\"  x Posterior and lateral   3 way hip 10x ea lime x x    Calf stretch on SB 3x30\"  L5   x    Stool hip flexor  3x20\"          Lunges FW  15x   x x     Resisted stepping 3L lime  x    Marching 3L // bars   x     Other:       Specific Instructions for next treatment:  1. Continue with basic quad and glute strengthening        Treatment Charges: Mins Units   []  Modalities     [x]  Ther Exercise 40 3   []  Manual Therapy     []  Ther Activities     []  Aquatics     []  Vasocompression     []  Other     Total Treatment time 40 3       Assessment: [x] Progressing toward goals. Pt with no c/o heart rate problems with progression through therex but instructed pt to rest as needed. Pt with increased knee soreness with sit to stands this date as well as lateral step ups. Completed stretches with good recall. Postural cueing needed with resisted 3 way hip to avoid flexed posture. Pt fatigue by the end of treatment. Will plan to update HEP and ensure independence by the end of last two visits. [] No change. [] Other:  [x] Patient would continue to benefit from skilled physical therapy services in order to: increase L/R knee extension ROM and LE strength to decrease pain and improve function. STG: (to be met in 10 treatments)  1. ? Pain: as her activity level increases  2. ? ROM: achieve 0 deg of ext  3. ? Strength: able to perform 2x10\"   4. ? Function: LEFI <40% interference  5. Patient to be independent with home exercise program as demonstrated by performance with correct form without cues. 6.    LTG: (to be met in 20 treatments)  1. LEFI <30% interference  2. Able to sit stand transfer out of a chair without use of her arms for strength                    Patient goals: \"strengthen muscles in knee and LB area\"    Pt. Education:  [x] Yes  [] No  [] Reviewed Prior HEP/Ed  Method of Education: [] Verbal  [x] Demo  [x] Written   Access Code: [email or web address]  URL: [email or web address]  Date: 10/15/2021  Prepared by: Jane Gupta    Exercises  Sit to Stand - 2 x daily - 7 x weekly - 2 sets - 10 reps  Lateral Step Up with Counter Support - 2 x daily - 7 x weekly - 2 sets - 10 reps  Forward Step Up with Counter Support - 2 x daily - 7 x weekly - 2 sets - 10 reps  Lunge with Counter Support - 2 x daily - 7 x weekly - 2 sets - 10 reps  Heel rises with counter support - 2 x daily - 7 x weekly - 2 sets - 10 reps  Standing Hip Abduction with Counter Support - 2 x daily - 7 x weekly - 2 sets - 10 reps  Standing Hip Extension with Counter Support - 2 x daily - 7 x weekly - 2 sets - 10 reps  Standing March with Counter Support - 2 x daily - 7 x weekly - 2 sets - 10 reps      Comprehension of Education:  [x] Verbalizes understanding. [x] Demonstrates understanding. [] Needs review. [] Demonstrates/verbalizes HEP/Ed previously given. Plan: [x] Continue current frequency toward long and short term goals. [x] Specific Instructions for subsequent treatments: Finish out her POC and DC to HEP.          Time In: 10:00 am            Time Out: 10:44 am    Electronically signed by:  Jhoan Matos PTA

## 2021-11-22 ENCOUNTER — HOSPITAL ENCOUNTER (OUTPATIENT)
Dept: PHYSICAL THERAPY | Facility: CLINIC | Age: 76
Setting detail: THERAPIES SERIES
Discharge: HOME | End: 2021-11-22
Payer: MEDICARE

## 2021-11-22 NOTE — FLOWSHEET NOTE
[] [facility]) [facility] &  Therapy  [address]    [phone]  F: [phone]   [] [address]  [facility]   [address]  P: [phone]  F: [phone]  [] [address]  [address] [facility] [address]  P: [phone]  F: [phone] [] [address]  P: [phone]  F: [phone]  [] [address]  [address] [facility] [address]   [address]   [place]: [phone]  F: [phone]   [] [address]  [place]: [phone]   F: [phone]     Physical Therapy Cancel/No Show note    Date: 2021  Patient: Marylee Horner  : 1945  MRN: [id number]    Cancels/No Shows to date:     For today's appointment patient:    [x]  Cancelled    [] Rescheduled appointment    [] No-show     Reason given by patient:    [x]  Patient ill    []  Conflicting appointment    [] No transportation      [] Conflict with work    [] No reason given    [] Weather related    [] MMPNO-03    [] Other:      Comments:       [] Next appointment was confirmed    Electronically signed by: Megan Calabrese

## 2021-11-24 ENCOUNTER — APPOINTMENT (OUTPATIENT)
Dept: GENERAL RADIOLOGY | Age: 76
End: 2021-11-24
Payer: MEDICARE

## 2021-11-24 ENCOUNTER — APPOINTMENT (OUTPATIENT)
Dept: CT IMAGING | Age: 76
End: 2021-11-24
Payer: MEDICARE

## 2021-11-24 ENCOUNTER — HOSPITAL ENCOUNTER (EMERGENCY)
Age: 76
Discharge: HOME | End: 2021-11-24
Attending: EMERGENCY MEDICINE
Payer: MEDICARE

## 2021-11-24 ENCOUNTER — OFFICE VISIT (OUTPATIENT)
Dept: ORTHOPEDIC SURGERY | Age: 76
End: 2021-11-24
Payer: MEDICARE

## 2021-11-24 VITALS
HEART RATE: 72 BPM | OXYGEN SATURATION: 98 % | TEMPERATURE: 98 F | DIASTOLIC BLOOD PRESSURE: 78 MMHG | WEIGHT: 240 LBS | RESPIRATION RATE: 18 BRPM | HEIGHT: 64 IN | BODY MASS INDEX: 40.97 KG/M2 | SYSTOLIC BLOOD PRESSURE: 160 MMHG

## 2021-11-24 VITALS — BODY MASS INDEX: 40.97 KG/M2 | HEIGHT: 64 IN | WEIGHT: 240 LBS

## 2021-11-24 DIAGNOSIS — S00.33XA HEMATOMA OF NASAL SEPTUM, INITIAL ENCOUNTER: ICD-10-CM

## 2021-11-24 DIAGNOSIS — M17.0 ARTHRITIS OF BOTH KNEES: Primary | ICD-10-CM

## 2021-11-24 DIAGNOSIS — S80.02XA CONTUSION OF LEFT KNEE, INITIAL ENCOUNTER: ICD-10-CM

## 2021-11-24 DIAGNOSIS — S09.90XA CLOSED HEAD INJURY, INITIAL ENCOUNTER: Primary | ICD-10-CM

## 2021-11-24 DIAGNOSIS — R04.0 EPISTAXIS: ICD-10-CM

## 2021-11-24 PROCEDURE — 10060 I&D ABSCESS SIMPLE/SINGLE: CPT

## 2021-11-24 PROCEDURE — 4040F PNEUMOC VAC/ADMIN/RCVD: CPT | Performed by: ORTHOPAEDIC SURGERY

## 2021-11-24 PROCEDURE — 73562 X-RAY EXAM OF KNEE 3: CPT

## 2021-11-24 PROCEDURE — 2500000003 HC RX 250 WO HCPCS: Performed by: PHYSICIAN ASSISTANT

## 2021-11-24 PROCEDURE — G8400 PT W/DXA NO RESULTS DOC: HCPCS | Performed by: ORTHOPAEDIC SURGERY

## 2021-11-24 PROCEDURE — 99213 OFFICE O/P EST LOW 20 MIN: CPT | Performed by: ORTHOPAEDIC SURGERY

## 2021-11-24 PROCEDURE — 99284 EMERGENCY DEPT VISIT MOD MDM: CPT

## 2021-11-24 PROCEDURE — 70486 CT MAXILLOFACIAL W/O DYE: CPT

## 2021-11-24 PROCEDURE — 1090F PRES/ABSN URINE INCON ASSESS: CPT | Performed by: ORTHOPAEDIC SURGERY

## 2021-11-24 PROCEDURE — 70450 CT HEAD/BRAIN W/O DYE: CPT

## 2021-11-24 PROCEDURE — G8427 DOCREV CUR MEDS BY ELIG CLIN: HCPCS | Performed by: ORTHOPAEDIC SURGERY

## 2021-11-24 PROCEDURE — 1036F TOBACCO NON-USER: CPT | Performed by: ORTHOPAEDIC SURGERY

## 2021-11-24 PROCEDURE — G8484 FLU IMMUNIZE NO ADMIN: HCPCS | Performed by: ORTHOPAEDIC SURGERY

## 2021-11-24 PROCEDURE — G8417 CALC BMI ABV UP PARAM F/U: HCPCS | Performed by: ORTHOPAEDIC SURGERY

## 2021-11-24 PROCEDURE — 1123F ACP DISCUSS/DSCN MKR DOCD: CPT | Performed by: ORTHOPAEDIC SURGERY

## 2021-11-24 PROCEDURE — 6370000000 HC RX 637 (ALT 250 FOR IP): Performed by: PHYSICIAN ASSISTANT

## 2021-11-24 RX ORDER — AMOXICILLIN AND CLAVULANATE POTASSIUM 875; 125 MG/1; MG/1
1 TABLET, FILM COATED ORAL 2 TIMES DAILY
Qty: 10 TABLET | Refills: 0 | Status: SHIPPED | OUTPATIENT
Start: 2021-11-24 | End: 2021-11-29 | Stop reason: ALTCHOICE

## 2021-11-24 RX ADMIN — BENZOCAINE, BUTAMBEN, AND TETRACAINE HYDROCHLORIDE 1 SPRAY: .028; .004; .004 AEROSOL, SPRAY TOPICAL at 13:10

## 2021-11-24 RX ADMIN — PHENYLEPHRINE HYDROCHLORIDE 1 SPRAY: 0.5 SPRAY NASAL at 13:10

## 2021-11-24 ASSESSMENT — ENCOUNTER SYMPTOMS
ABDOMINAL PAIN: 0
SHORTNESS OF BREATH: 0
SORE THROAT: 0
EYE PAIN: 0
NAUSEA: 0
EYE REDNESS: 0
CONSTIPATION: 0
VOMITING: 0
DIARRHEA: 0
COUGH: 0
COUGH: 0
EYE DISCHARGE: 0
NAUSEA: 0

## 2021-11-24 NOTE — ED NOTES
DC instructions reviewed and pt verbalize understanding of f/u care and reasons to return to ED.  DC MATTHEW in stable condition with all belongings     Rene Mcdonnell RN  11/24/21 [id number]

## 2021-11-24 NOTE — ED TRIAGE NOTES
Ambulatory from home s/p mechanical fall around 0630 today  Takes baby ASA daily  Was struggling to swing leg into the bathtub and fell forward, hitting face on edge of tub  No LOC  FROM to neck, neuro WNL  Bruising and edema to nose  Bruising under both eyes  Dentition intact  Pain, bruising to LLE, skin intact  No meds PTA

## 2021-11-24 NOTE — ED PROVIDER NOTES
[id number] [facility] ED    [address] [facility] [address]  Phone: [phone]  Fax: [phone]  Emergency Department  Faculty Attestation    I performed a history and physical examination of the patient and discussed management with the mid level provideer. I reviewed the mid level provider's note and agree with the documented findings and plan of care. Any areas of disagreement are noted on the chart. I was personally present for the key portions of any procedures. I have documented in the chart those procedures where I was not present during the key portions. I have reviewed the emergency nurses triage note. I agree with the chief complaint, past medical history, past surgical history, allergies, medications, social and family history as documented unless otherwise noted below. Documentation of the HPI, Physical Exam and Medical Decision Making performed by medical students or scribes is based on my personal performance of the HPI, PE and MDM. For Physician Assistant/ Nurse Practitioner cases/documentation I have personally evaluated this patient and have completed at least one if not all key elements of the E/M (history, physical exam, and MDM). Additional findings are as noted. Primary Care Physician:  Shu Niño MD    [address] [place] [address]       Chief Complaint   Patient presents with    Fall     on ASA       RECENT VITALS:   Temp: 98 °F (36.7 °C),  Pulse: 72, Resp: 18, BP: (!) 160/78    LABS:  Labs Reviewed - No data to display       XR KNEE LEFT (3 VIEWS) (Final result)  Result time 11/24/21 12:01:36  Final result by Angeline Jara DO (11/24/21 12:01:36)                Impression:    No acute abnormality of the left knee. Narrative:    EXAMINATION:   THREE XRAY VIEWS OF THE LEFT KNEE     11/24/2021 8:38 am     COMPARISON:   None.      HISTORY:   ORDERING SYSTEM PROVIDED HISTORY: slip and fall   TECHNOLOGIST PROVIDED HISTORY:   slip and fall   Reason for Exam: left knee pain   Acuity: Acute   Type of Exam: Initial   Mechanism of Injury: fall in shower     FINDINGS:   No acute fracture or dislocation.  Patella baja.  Mild tricompartmental   degenerative changes, greatest in the medial femoral tibial compartment with   mild degenerative lateral subluxation of the proximal tibia relative to the   distal femur.  No effusion or foreign body.  Scattered vascular   calcifications.                       CT HEAD WO CONTRAST (Final result)  Result time 11/24/21 12:11:29  Final result by Jackie Buchanan DO (11/24/21 12:11:29)                Impression:    No acute intracranial abnormality on a background of chronic small vessel   ischemic changes. No acute maxillofacial fracture.  Soft tissue swelling of the nose with   thickening in the region of the cartilaginous portion of the nasal septum. Correlate for hematoma. Mild chronic pansinus mucosal disease without findings of acute sinusitis. Narrative:    EXAMINATION:   CT OF THE HEAD WITHOUT CONTRAST; CT OF THE FACE WITHOUT CONTRAST 11/24/2021   8:39 am     TECHNIQUE:   CT of the head was performed without the administration of intravenous   contrast. Dose modulation, iterative reconstruction, and/or weight based   adjustment of the mA/kV was utilized to reduce the radiation dose to as low   as reasonably achievable.; CT of the face was performed without the   administration of intravenous contrast. Multiplanar reformatted images are   provided for review. Dose modulation, iterative reconstruction, and/or weight   based adjustment of the mA/kV was utilized to reduce the radiation dose to as   low as reasonably achievable. COMPARISON:   None.      HISTORY:   ORDERING SYSTEM PROVIDED HISTORY: slip and fall in the shower   TECHNOLOGIST PROVIDED HISTORY:   slip and fall in the shower     Decision Support Exception - unselect if not a suspected or confirmed   emergency medical condition->Emergency Medical Condition (MA)   Reason for Exam: fall in shower today hitting face   Acuity: Acute   Type of Exam: Initial   Mechanism of Injury: head and face pain; ORDERING SYSTEM PROVIDED HISTORY:   slipped in the shower; nasal bone injury with epistaxis   TECHNOLOGIST PROVIDED HISTORY:   slipped in the shower; nasal bone injury with epistaxis   Decision Support Exception - unselect if not a suspected or confirmed   emergency medical condition->Emergency Medical Condition (MA)   Reason for Exam: face and head pain   Acuity: Acute   Type of Exam: Initial   Mechanism of Injury: fall in shower hitting brdge of nose/ face     FINDINGS:   CT HEAD:     BRAIN/VENTRICLES: There is no acute intracranial hemorrhage, mass effect or   midline shift. No abnormal extra-axial fluid collection.  The gray-white   differentiation is maintained without evidence of an acute infarct. 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  Background moderate patchy areas of low attenuation throughout the   periventricular, subcortical, and deep white matter in a pattern of   distribution that is presumably due to chronic small vessel ischemic change. Mild volume loss with sulcal prominence. Юлия Section is no evidence of   hydrocephalus. Calcifications in the carotid siphons and V4 segment vertebral   arteries. CT FACIAL BONES:     FACIAL BONES: The maxilla, pterygoid plates and zygomatic arches are intact. The mandible is intact.  Streak artifact from dental amalgam does somewhat   degrade assessment of the mandible and maxilla.  The mandibular condyles are   normally situated.  The nasal bones and maxillary nasal spine are intact. Smooth rightward nasal septal deviation without a bony spur.  Soft tissue   swelling of the anterior cartilaginous portion of the septum.      ORBITS: The globes appear intact.  The extraocular muscles, optic nerve   sheath complexes and lacrimal glands appear unremarkable.  No retrobulbar   hematoma or mass is seen.  The orbital walls and rims are intact. SINUSES/MASTOIDS: The paranasal sinuses and mastoid air cells demonstrate   mild chronic pansinus mucosal disease without any aerated secretions or   air-fluid levels.  There is partial opacification of the mastoid air cells   without any osseous demineralization or destruction.  No acute fracture is   seen. SOFT TISSUES: No acute abnormality of the visualized skull.  Soft tissue   swelling of the nose.                       CT FACIAL BONES WO CONTRAST (Final result)  Result time 11/24/21 12:11:29  Final result by Ned Luke DO (11/24/21 12:11:29)                Impression:    No acute intracranial abnormality on a background of chronic small vessel   ischemic changes. No acute maxillofacial fracture.  Soft tissue swelling of the nose with   thickening in the region of the cartilaginous portion of the nasal septum. Correlate for hematoma. Mild chronic pansinus mucosal disease without findings of acute sinusitis. Narrative:    EXAMINATION:   CT OF THE HEAD WITHOUT CONTRAST; CT OF THE FACE WITHOUT CONTRAST 11/24/2021   8:39 am     TECHNIQUE:   CT of the head was performed without the administration of intravenous   contrast. Dose modulation, iterative reconstruction, and/or weight based   adjustment of the mA/kV was utilized to reduce the radiation dose to as low   as reasonably achievable.; CT of the face was performed without the   administration of intravenous contrast. Multiplanar reformatted images are   provided for review. Dose modulation, iterative reconstruction, and/or weight   based adjustment of the mA/kV was utilized to reduce the radiation dose to as   low as reasonably achievable. COMPARISON:   None.      HISTORY:   ORDERING SYSTEM PROVIDED HISTORY: slip and fall in the shower   TECHNOLOGIST PROVIDED HISTORY: slip and fall in the shower     Decision Support Exception - unselect if not a suspected or confirmed   emergency medical condition->Emergency Medical Condition (MA)   Reason for Exam: fall in shower today hitting face   Acuity: Acute   Type of Exam: Initial   Mechanism of Injury: head and face pain; ORDERING SYSTEM PROVIDED HISTORY:   slipped in the shower; nasal bone injury with epistaxis   TECHNOLOGIST PROVIDED HISTORY:   slipped in the shower; nasal bone injury with epistaxis   Decision Support Exception - unselect if not a suspected or confirmed   emergency medical condition->Emergency Medical Condition (MA)   Reason for Exam: face and head pain   Acuity: Acute   Type of Exam: Initial   Mechanism of Injury: fall in shower hitting brdge of nose/ face     FINDINGS:   CT HEAD:     BRAIN/VENTRICLES: There is no acute intracranial hemorrhage, mass effect or   midline shift. No abnormal extra-axial fluid collection.  The gray-white   differentiation is maintained without evidence of an acute infarct. 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  Background moderate patchy areas of low attenuation throughout the   periventricular, subcortical, and deep white matter in a pattern of   distribution that is presumably due to chronic small vessel ischemic change. Mild volume loss with sulcal prominence. Roseanna Gosselin is no evidence of   hydrocephalus. Calcifications in the carotid siphons and V4 segment vertebral   arteries. CT FACIAL BONES:     FACIAL BONES: The maxilla, pterygoid plates and zygomatic arches are intact. The mandible is intact.  Streak artifact from dental amalgam does somewhat   degrade assessment of the mandible and maxilla.  The mandibular condyles are   normally situated.  The nasal bones and maxillary nasal spine are intact.    Smooth rightward nasal septal deviation without a bony spur.  Soft tissue   swelling of the anterior cartilaginous portion of the septum. ORBITS: The globes appear intact.  The extraocular muscles, optic nerve   sheath complexes and lacrimal glands appear unremarkable.  No retrobulbar   hematoma or mass is seen.  The orbital walls and rims are intact. SINUSES/MASTOIDS: The paranasal sinuses and mastoid air cells demonstrate   mild chronic pansinus mucosal disease without any aerated secretions or   air-fluid levels.  There is partial opacification of the mastoid air cells   without any osseous demineralization or destruction.  No acute fracture is   seen. SOFT TISSUES: No acute abnormality of the visualized skull.  Soft tissue   swelling of the nose.                       PERTINENT ATTENDING PHYSICIAN COMMENTS:    The patient presents with after a fall. She struck her face and also has pain in her knee. Complains of nasal discomfort as well as pain in the left knee she denies loss of consciousness. On exam, the patient has obvious contusion to her nose but normal extraocular motion. There is some concern about a septal hematoma on the right naris. She has no active bleeding. She has minimal neck discomfort. The patient has some knee discomfort but has intact distal pulses in all extremities and is able to flex and extend normally. The patient's CT showed no fracture but did show the hematoma of the nose. This was drained by the PA. Please refer to her documentation. We will refer the patient to ENT for follow-up. The patient is discharged in good condition.          Malika Tanner MD  11/29/21 8045

## 2021-11-24 NOTE — PROGRESS NOTES
[address] [facility]  [address] [address] [place] [address]  [address] [address]  Dept: [phone]  Dept Fax: [phone]        Ambulatory Follow Up      Subjective: Pro Romo is a 64y.o. year old female who presents to our office today for routine followup regarding her   1. Arthritis of both knees    2. Contusion of left knee, initial encounter    . Chief Complaint   Patient presents with    Follow-up     bilateral knee        HPI- Patient is here today for bilateral knee follow up. She was last seen on 9/22/2021 for her bilateral knee arthritis and went over treatment in the form of physical therapy. Patient notes she 20% better with physical therapy. Patient denies pain ot both her knees but continues to have stiffness which causes her problems. Patient says that her back is causing her the most rouble when she walks and says she is seeing a specialist for that. Of note patient mentions that she fell out of the bathtub earlier this morning and hit hurt face/nose. recommended that if she is having problem to go downstairs to the ER. Review of Systems   Constitutional: Negative for chills and fever. Respiratory: Negative for cough. Gastrointestinal: Negative for constipation, diarrhea and nausea. Musculoskeletal: Positive for arthralgias (Bilateral knees). Negative for gait problem, joint swelling and myalgias. Neurological: Negative for dizziness, weakness and numbness. I have reviewed the CC, HPI, ROS, PMH, FHX, Social History, and if not present in this note, I have reviewed in the patient's chart. I agree with the documentation provided by other staff and have reviewed their documentation prior to providing my signature indicating agreement. Objective :   Ht 5' 4\" (1.626 m)   Wt 240 lb (108.9 kg)   BMI 41.20 kg/m²  Body mass index is 41.2 kg/m².   General: Pro Romo is a 64 y.o. female who is alert and oriented and sitting comfortably in our office. Ortho Exam  MS:  Mild limp on the left. Moderate ecchymosis proximal medial tibia. Negative hip log roll noted. Motor, sensory, vascular examination of the left lower extremity is grossly intact without focal deficits. Neuro: alert and oriented to person and place. Eyes: Extra-ocular muscles intact  Mouth: Oral mucosa moist. No perioral lesions  Pulm: Respirations unlabored and regular. Symmetric chest excursion without outward deformity is noted. Skin: warm, well perfused  Psych:   Patient has good fund of knowledge and displays understanging of exam, diagnosis, and plan. Radiology: XR KNEE LEFT (3 VIEWS)    Result Date: 11/24/2021  EXAMINATION: THREE XRAY VIEWS OF THE LEFT KNEE 11/24/2021 8:38 am COMPARISON: None. HISTORY: ORDERING SYSTEM PROVIDED HISTORY: slip and fall TECHNOLOGIST PROVIDED HISTORY: slip and fall Reason for Exam: left knee pain Acuity: Acute Type of Exam: Initial Mechanism of Injury: fall in shower FINDINGS: No acute fracture or dislocation. Patella baja. Mild tricompartmental degenerative changes, greatest in the medial femoral tibial compartment with mild degenerative lateral subluxation of the proximal tibia relative to the distal femur. No effusion or foreign body. Scattered vascular calcifications. No acute abnormality of the left knee. Assessment:      1. Arthritis of both knees    2. Contusion of left knee, initial encounter       Plan:       Since patient is doing well will keep her in formal therapy to help her increase her strength. Discussed with patient that she does have end stage arthrits in both knees and that her options are conservative vs total knee replacements. Patient would like to continue with her physical therapy and HEP. She will contact her back doctor. Patient will follow up as needed. Follow up:Return if symptoms worsen or fail to improve.     No orders of the defined types were placed in

## 2021-11-24 NOTE — ED PROVIDER NOTES
[id number] [facility] ED  [address] [facility] [address]  Phone: [phone]  Fax: [phone]      Pt Name: Flako Beasley  MRN: [id number]  [place] 1945  Date of evaluation: 11/24/2021      CHIEF COMPLAINT       Chief Complaint   Patient presents with    Fall     on ASA       HISTORY OF PRESENT ILLNESS   (Location, Quality, Severity, Duration, Timing, Context, ModifyingFactors, Associated Signs and Symptoms)     Flako Beasley is a 64 y.o. female who presents to the ER for evaluation following a fall. Patient states that at 6 AM this morning she was going to get into the shower. As she was stepping in she slipped and fell hitting her face and head against the back of the shower. She states that she also came down onto her left knee. Patient was able to get up from her fall. Patient denies chest pain, difficulty breathing, dizziness and numbness or weakness in her extremities as precipitating factors to her fall. She has been ambulating without difficulty but states that the left knee is swollen and tender. She denies loss of consciousness. She has bruising and swelling to her nose. She states that her nose was bleeding for approximately 30 minutes. Patient is on aspirin 81 mg daily. She denies headaches and change in vision. She denies neck pain. The patient states that she had an appointment this morning with her primary care physician for back pain. They recommended that she come to the ER for evaluation following her fall. Patient denies acute pain at the current time. Nursing Notes were reviewed. REVIEW OF SYSTEMS     (2-9 systems for level 4, 10 or more for level 5)    Review of Systems   Constitutional: Negative for chills and fever. HENT: Positive for nosebleeds. Negative for ear pain and sore throat. Eyes: Negative for pain, discharge, redness and visual disturbance. Respiratory: Negative for cough and shortness of breath.     Cardiovascular: Negative for chest pain. Gastrointestinal: Negative for abdominal pain, nausea and vomiting. Genitourinary: Negative for dysuria and frequency. Musculoskeletal: Negative for neck pain. Left knee pain. Skin:        Bruising and swelling to the nose and right forehead. Neurological: Negative for seizures, syncope and headaches. All other systems reviewed and are negative. PAST MEDICAL HISTORY    has a past medical history of AK (actinic keratosis), Anesthesia, Arthritis, Asymptomatic bilateral carotid artery stenosis, Bladder cancer (HCC), CAD (coronary artery disease), Colon polyp, Diarrhea, Difficult intubation, GERD (gastroesophageal reflux disease), History of atrial fibrillation, Hypertension, Hypothyroid, Obesity, DAISY (obstructive sleep apnea), PAC (premature atrial contraction), Prolonged emergence from general anesthesia, Stress incontinence, and Wears glasses. SURGICAL HISTORY      has a past surgical history that includes Tonsillectomy; other surgical history; cyst removal (Right); Hemorrhoid surgery; Cystocopy (2014); Bladder surgery (8/1993 and 10/2002); Colonoscopy; eye surgery; Cystoscopy (11-17-15); Breast biopsy; Cystocopy (11/18/2016); Cholecystectomy; Hysterectomy, total abdominal (1999); Coronary angioplasty (07/03/2014); Cystoscopy (N/A, 1/21/2020); knee surgery (Bilateral, 07/01/2020); back surgery (2019); and Cystoscopy (N/A, 11/17/2020). CURRENT MEDICATIONS       Discharge Medication List as of 11/24/2021  1:51 PM      CONTINUE these medications which have NOT CHANGED    Details   Skin Protectants, Misc. (EUCERIN) cream Apply topically as needed. , Disp-1 g, R-0, Normal      vitamin D (ERGOCALCIFEROL) 1.25 MG (72391 UT) CAPS capsule Take 1 capsule by mouth once a week, Disp-4 capsule, R-5Normal      levothyroxine (SYNTHROID) 75 MCG tablet TAKE ONE TABLET BY MOUTH DAILY, Disp-90 tablet, R-1Normal      rosuvastatin (CRESTOR) 20 MG tablet Historical Med      losartan (COZAAR) 100 MG tablet TAKE ONE TABLET BY MOUTH DAILY, Disp-90 tablet, R-0Normal      metoprolol tartrate (LOPRESSOR) 25 MG tablet TAKE ONE TABLET BY MOUTH TWICE A DAY, Disp-180 tablet, R-2Normal      furosemide (LASIX) 20 MG tablet TAKE ONE TABLET BY MOUTH DAILY AS NEEDED FOR PEDAL EDEMA, Disp-60 tablet, R-0Normal      pantoprazole (PROTONIX) 40 MG tablet Take 40 mg by mouth daily Historical Med      clotrimazole-betamethasone (LOTRISONE) 1-0.05 % cream Apply externally to affected area twice daily prn, Disp-45 g, R-1, Normal      aspirin 81 MG EC tablet Take 81 mg by mouth daily      nitroGLYCERIN (NITROSTAT) 0.4 MG SL tablet Place 0.4 mg under the tongue every 5 minutes as needed for Chest pain. Multiple Vitamins-Minerals (THERAPEUTIC MULTIVITAMIN-MINERALS) tablet Take 1 tablet by mouth daily. ALLERGIES     is allergic to atorvastatin, other, tobramycin, and molds & smuts. FAMILY HISTORY     She indicated that her mother is . She indicated that her father is . She indicated that her sister is alive. She indicated that the status of her maternal grandmother is unknown. She indicated that the status of her maternal grandfather is unknown. She indicated that the status of her paternal grandfather is unknown. She indicated that the status of her paternal aunt is unknown.     family history includes Cancer in her father, mother, and paternal aunt; Heart Disease in her maternal grandfather, maternal grandmother, and paternal grandfather; Other in her mother. SOCIAL HISTORY      reports that she quit smoking about 29 years ago. Her smoking use included cigarettes. She has a 45.00 pack-year smoking history. She has never used smokeless tobacco. She reports current alcohol use. She reports that she does not use drugs.     PHYSICAL EXAM     (7 for level 4, 8 or more for level 5)    ED Triage Vitals [21 1108]   BP Temp Temp Source Pulse Resp SpO2 Height Weight   (S) (!) 203/92 98 °F (36.7 °C) Oral 89 16 98 % 5' 4\" (1.626 m) 240 lb (108.9 kg)     Physical Exam  Vitals reviewed. Constitutional:       General: She is not in acute distress. Appearance: She is obese. She is not toxic-appearing. HENT:      Head:      Jaw: There is normal jaw occlusion. Comments: Bruising and swelling to the right forward. Bruising and swelling to the nasal bridge. No active nasal bleeding. Swelling to the right nasal septum. No fractured or loose teeth. Right Ear: Tympanic membrane and ear canal normal.      Left Ear: Tympanic membrane and ear canal normal.   Eyes:      General: No scleral icterus. Extraocular Movements: Extraocular movements intact. Pupils: Pupils are equal, round, and reactive to light. Neck:      Comments: No cervical midline tenderness. Cardiovascular:      Rate and Rhythm: Normal rate and regular rhythm. Pulses: Normal pulses. Pulmonary:      Effort: Pulmonary effort is normal. No respiratory distress. Breath sounds: Normal breath sounds. Musculoskeletal:      Cervical back: Normal range of motion and neck supple. Right knee: No swelling, deformity, erythema or bony tenderness. Left knee: Swelling and ecchymosis present. No deformity. Normal range of motion. Tenderness present over the medial joint line. Comments: Normal ambulation. Neurological:      General: No focal deficit present. Mental Status: She is alert and oriented to person, place, and time. Cranial Nerves: No cranial nerve deficit. Psychiatric:         Mood and Affect: Mood normal.       DIAGNOSTIC RESULTS     RADIOLOGY:   Radiology images were visualized by myself. The Radiologist interpretations were reviewed and are as follows:          XR KNEE LEFT (3 VIEWS) (Final result)  Result time 11/24/21 12:01:36  Final result by Nick Valero DO (11/24/21 12:01:36)                Impression:    No acute abnormality of the left knee. Narrative:    EXAMINATION:   THREE XRAY VIEWS OF THE LEFT KNEE     11/24/2021 8:38 am     COMPARISON:   None. HISTORY:   ORDERING SYSTEM PROVIDED HISTORY: slip and fall   TECHNOLOGIST PROVIDED HISTORY:   slip and fall   Reason for Exam: left knee pain   Acuity: Acute   Type of Exam: Initial   Mechanism of Injury: fall in shower     FINDINGS:   No acute fracture or dislocation.  Patella baja.  Mild tricompartmental   degenerative changes, greatest in the medial femoral tibial compartment with   mild degenerative lateral subluxation of the proximal tibia relative to the   distal femur.  No effusion or foreign body.  Scattered vascular   calcifications.                        CT HEAD WO CONTRAST (Final result)  Result time 11/24/21 12:11:29  Final result by Lucita Lomeli DO (11/24/21 12:11:29)                Impression:    No acute intracranial abnormality on a background of chronic small vessel   ischemic changes. No acute maxillofacial fracture.  Soft tissue swelling of the nose with   thickening in the region of the cartilaginous portion of the nasal septum. Correlate for hematoma. Mild chronic pansinus mucosal disease without findings of acute sinusitis. Narrative:    EXAMINATION:   CT OF THE HEAD WITHOUT CONTRAST; CT OF THE FACE WITHOUT CONTRAST 11/24/2021   8:39 am     TECHNIQUE:   CT of the head was performed without the administration of intravenous   contrast. Dose modulation, iterative reconstruction, and/or weight based   adjustment of the mA/kV was utilized to reduce the radiation dose to as low   as reasonably achievable.; CT of the face was performed without the   administration of intravenous contrast. Multiplanar reformatted images are   provided for review. Dose modulation, iterative reconstruction, and/or weight   based adjustment of the mA/kV was utilized to reduce the radiation dose to as   low as reasonably achievable. COMPARISON:   None.      HISTORY: ORDERING SYSTEM PROVIDED HISTORY: slip and fall in the shower   TECHNOLOGIST PROVIDED HISTORY:   slip and fall in the shower     Decision Support Exception - unselect if not a suspected or confirmed   emergency medical condition->Emergency Medical Condition (MA)   Reason for Exam: fall in shower today hitting face   Acuity: Acute   Type of Exam: Initial   Mechanism of Injury: head and face pain; ORDERING SYSTEM PROVIDED HISTORY:   slipped in the shower; nasal bone injury with epistaxis   TECHNOLOGIST PROVIDED HISTORY:   slipped in the shower; nasal bone injury with epistaxis   Decision Support Exception - unselect if not a suspected or confirmed   emergency medical condition->Emergency Medical Condition (MA)   Reason for Exam: face and head pain   Acuity: Acute   Type of Exam: Initial   Mechanism of Injury: fall in shower hitting brdge of nose/ face     FINDINGS:   CT HEAD:     BRAIN/VENTRICLES: There is no acute intracranial hemorrhage, mass effect or   midline shift. No abnormal extra-axial fluid collection.  The gray-white   differentiation is maintained without evidence of an acute infarct. 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  Background moderate patchy areas of low attenuation throughout the   periventricular, subcortical, and deep white matter in a pattern of   distribution that is presumably due to chronic small vessel ischemic change. Mild volume loss with sulcal prominence. Louanna Penn is no evidence of   hydrocephalus. Calcifications in the carotid siphons and V4 segment vertebral   arteries. CT FACIAL BONES:     FACIAL BONES: The maxilla, pterygoid plates and zygomatic arches are intact.    The mandible is intact.  Streak artifact from dental amalgam does somewhat   degrade assessment of the mandible and maxilla.  The mandibular condyles are   normally situated.  The nasal bones and maxillary nasal spine are intact. Smooth rightward nasal septal deviation without a bony spur.  Soft tissue   swelling of the anterior cartilaginous portion of the septum. ORBITS: The globes appear intact.  The extraocular muscles, optic nerve   sheath complexes and lacrimal glands appear unremarkable.  No retrobulbar   hematoma or mass is seen.  The orbital walls and rims are intact. SINUSES/MASTOIDS: The paranasal sinuses and mastoid air cells demonstrate   mild chronic pansinus mucosal disease without any aerated secretions or   air-fluid levels.  There is partial opacification of the mastoid air cells   without any osseous demineralization or destruction.  No acute fracture is   seen. SOFT TISSUES: No acute abnormality of the visualized skull.  Soft tissue   swelling of the nose.                       CT FACIAL BONES WO CONTRAST (Final result)  Result time 11/24/21 12:11:29  Final result by Selwyn Craven DO (11/24/21 12:11:29)                Impression:    No acute intracranial abnormality on a background of chronic small vessel   ischemic changes. No acute maxillofacial fracture.  Soft tissue swelling of the nose with   thickening in the region of the cartilaginous portion of the nasal septum. Correlate for hematoma. Mild chronic pansinus mucosal disease without findings of acute sinusitis. Narrative:    EXAMINATION:   CT OF THE HEAD WITHOUT CONTRAST; CT OF THE FACE WITHOUT CONTRAST 11/24/2021   8:39 am     TECHNIQUE:   CT of the head was performed without the administration of intravenous   contrast. Dose modulation, iterative reconstruction, and/or weight based   adjustment of the mA/kV was utilized to reduce the radiation dose to as low   as reasonably achievable.; CT of the face was performed without the   administration of intravenous contrast. Multiplanar reformatted images are   provided for review.  Dose modulation, iterative reconstruction, and/or weight based adjustment of the mA/kV was utilized to reduce the radiation dose to as   low as reasonably achievable. COMPARISON:   None. HISTORY:   ORDERING SYSTEM PROVIDED HISTORY: slip and fall in the shower   TECHNOLOGIST PROVIDED HISTORY:   slip and fall in the shower     Decision Support Exception - unselect if not a suspected or confirmed   emergency medical condition->Emergency Medical Condition (MA)   Reason for Exam: fall in shower today hitting face   Acuity: Acute   Type of Exam: Initial   Mechanism of Injury: head and face pain; ORDERING SYSTEM PROVIDED HISTORY:   slipped in the shower; nasal bone injury with epistaxis   TECHNOLOGIST PROVIDED HISTORY:   slipped in the shower; nasal bone injury with epistaxis   Decision Support Exception - unselect if not a suspected or confirmed   emergency medical condition->Emergency Medical Condition (MA)   Reason for Exam: face and head pain   Acuity: Acute   Type of Exam: Initial   Mechanism of Injury: fall in shower hitting brdge of nose/ face     FINDINGS:   CT HEAD:     BRAIN/VENTRICLES: There is no acute intracranial hemorrhage, mass effect or   midline shift. No abnormal extra-axial fluid collection.  The gray-white   differentiation is maintained without evidence of an acute infarct. 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  Background moderate patchy areas of low attenuation throughout the   periventricular, subcortical, and deep white matter in a pattern of   distribution that is presumably due to chronic small vessel ischemic change. Mild volume loss with sulcal prominence. Hollace Gondola is no evidence of   hydrocephalus. Calcifications in the carotid siphons and V4 segment vertebral   arteries. CT FACIAL BONES:     FACIAL BONES: The maxilla, pterygoid plates and zygomatic arches are intact.    The mandible is intact.  Streak artifact from dental amalgam does somewhat   degrade assessment of the mandible and maxilla.  The mandibular condyles are   normally situated.  The nasal bones and maxillary nasal spine are intact. Smooth rightward nasal septal deviation without a bony spur.  Soft tissue   swelling of the anterior cartilaginous portion of the septum. ORBITS: The globes appear intact.  The extraocular muscles, optic nerve   sheath complexes and lacrimal glands appear unremarkable.  No retrobulbar   hematoma or mass is seen.  The orbital walls and rims are intact. SINUSES/MASTOIDS: The paranasal sinuses and mastoid air cells demonstrate   mild chronic pansinus mucosal disease without any aerated secretions or   air-fluid levels.  There is partial opacification of the mastoid air cells   without any osseous demineralization or destruction.  No acute fracture is   seen. SOFT TISSUES: No acute abnormality of the visualized skull.  Soft tissue   swelling of the nose. LABS:  No results found for this visit on 11/24/21. MDM:   Presents to the ER for evaluation following a slip and fall. Patient states that she fell getting into the shower this morning at 6 AM.  She hit her face against the back wall of the shower. She stated an injury to the nose and right forehead. She denies loss of consciousness. She states that she ended up coming down onto her left knee which is bruised and swollen. She denies loss of consciousness. She has been ambulating without difficulty. She did have a nosebleed which took approximately 30 minutes to resolve. Patient has no change in vision or headache. She denies neck pain. I will obtain plain film x-rays of the left knee. I will obtain CT scan imaging of the head and facial bones to evaluate for pathology. Patient is on aspirin 81 mg daily.     EMERGENCY DEPARTMENT COURSE:   Vitals:    Vitals:    11/24/21 1108 11/24/21 1300   BP: (S) (!) 203/92 (!) 160/78   Pulse: 89 72   Resp: 16 18   Temp: 98 °F (36.7 °C)    TempSrc: Oral    SpO2: 98% 98%   Weight: 108.9 kg (240 lb)    Height: 5' 4\" (1.626 m)      -------------------------  BP: (!) 160/78, Temp: 98 °F (36.7 °C), Pulse: 72, Resp: 18    The patient was given the following medications:  Orders Placed This Encounter   Medications    butamben-tetracaine-benzocaine (CETACAINE) spray 1 spray    phenylephrine (CAREN-SYNEPHRINE) 0.5 % nasal spray 1 spray    amoxicillin-clavulanate (AUGMENTIN) 875-125 MG per tablet     Sig: Take 1 tablet by mouth 2 times daily for 5 days     Dispense:  10 tablet     Refill:  0      Re-evaluation Notes  12:40 PM.  Results of the imaging studies were discussed with the patient by myself. Patient appears to have findings on CT scan consistent with a right septal hematoma. The septum will be anesthetized and the hematoma will be drained. She will follow-up with ENT. CT scan imaging of the head is unremarkable. X-ray of the left knee is unremarkable. 1:30 PM.  Right septal hematoma drianage: Will consent was obtained from the patient. 1 spray of Afrin was applied to the right naris. The right nasal septum was anesthetized with cetacaine. An incision to the right nasal septum was obtained with a #15 blade. A small clot from the septum was removed. There was minimal additional bleeding. The right nasal passage was packed with a Rhino Rocket. Patient tolerated the procedure well. Patient will be started on oral antibiotics given the procedure. She knows that packing should remain in place until Friday. She should call today for follow-up with ENT. Patient's blood pressure has improved during her ER course. Patient has been reminded to take her blood pressure medication as prescribed. FINAL IMPRESSION      1. Closed head injury, initial encounter    2. Hematoma of nasal septum, initial encounter    3. Contusion of left knee, initial encounter    4.  Epistaxis        DISPOSITION/PLAN   DISPOSITION Condition on Disposition  Stable    PATIENT REFERRED TO:  Scarlet Lerner MD  [address]  [place] [address] [address]  [phone]    Schedule an appointment as soon as possible for a visit   For reevaluation in 2-3 days    DISCHARGE MEDICATIONS:  Discharge Medication List as of 11/24/2021  1:51 PM      START taking these medications    Details   amoxicillin-clavulanate (AUGMENTIN) 875-125 MG per tablet Take 1 tablet by mouth 2 times daily for 5 days, Disp-10 tablet, R-0Normal           (Please note that portions of this note were completed with a voice recognition program.  Efforts were made to edit the dictations but occasionally words are mis-transcribed.)    Berta Encinas PA-C  11/24/21 Nelson Avendaño PA-C  11/24/21 3005

## 2021-11-29 ENCOUNTER — HOSPITAL ENCOUNTER (OUTPATIENT)
Dept: PREADMISSION TESTING | Age: 76
Discharge: HOME | End: 2021-12-03
Payer: MEDICARE

## 2021-11-29 ENCOUNTER — OFFICE VISIT (OUTPATIENT)
Dept: PRIMARY CARE CLINIC | Age: 76
End: 2021-11-29
Payer: MEDICARE

## 2021-11-29 VITALS
DIASTOLIC BLOOD PRESSURE: 88 MMHG | SYSTOLIC BLOOD PRESSURE: 136 MMHG | BODY MASS INDEX: 41.4 KG/M2 | RESPIRATION RATE: 16 BRPM | HEART RATE: 102 BPM | WEIGHT: 245 LBS | OXYGEN SATURATION: 98 %

## 2021-11-29 VITALS
DIASTOLIC BLOOD PRESSURE: 66 MMHG | RESPIRATION RATE: 18 BRPM | HEART RATE: 85 BPM | WEIGHT: 240 LBS | TEMPERATURE: 97.7 F | BODY MASS INDEX: 39.99 KG/M2 | HEIGHT: 65 IN | SYSTOLIC BLOOD PRESSURE: 149 MMHG | OXYGEN SATURATION: 96 %

## 2021-11-29 DIAGNOSIS — J01.80 OTHER SUBACUTE SINUSITIS: ICD-10-CM

## 2021-11-29 DIAGNOSIS — F51.01 PRIMARY INSOMNIA: ICD-10-CM

## 2021-11-29 DIAGNOSIS — Z91.81 STATUS POST FALL: Primary | ICD-10-CM

## 2021-11-29 LAB
ABSOLUTE EOS #: 0.2 K/UL (ref 0–0.4)
ABSOLUTE IMMATURE GRANULOCYTE: ABNORMAL K/UL (ref 0–0.3)
ABSOLUTE LYMPH #: 1.6 K/UL (ref 1–4.8)
ABSOLUTE MONO #: 0.7 K/UL (ref 0.1–1.3)
ANION GAP SERPL CALCULATED.3IONS-SCNC: 7 MMOL/L (ref 9–17)
BASOPHILS # BLD: 1 % (ref 0–2)
BASOPHILS ABSOLUTE: 0 K/UL (ref 0–0.2)
BUN BLDV-MCNC: 20 MG/DL (ref 8–23)
BUN/CREAT BLD: ABNORMAL (ref 9–20)
CALCIUM SERPL-MCNC: 9.4 MG/DL (ref 8.6–10.4)
CHLORIDE BLD-SCNC: 105 MMOL/L (ref 98–107)
CO2: 27 MMOL/L (ref 20–31)
CREAT SERPL-MCNC: 0.8 MG/DL (ref 0.5–0.9)
DIFFERENTIAL TYPE: ABNORMAL
EOSINOPHILS RELATIVE PERCENT: 3 % (ref 0–4)
GFR AFRICAN AMERICAN: >60 ML/MIN
GFR NON-AFRICAN AMERICAN: >60 ML/MIN
GFR SERPL CREATININE-BSD FRML MDRD: ABNORMAL ML/MIN/{1.73_M2}
GFR SERPL CREATININE-BSD FRML MDRD: ABNORMAL ML/MIN/{1.73_M2}
GLUCOSE BLD-MCNC: 118 MG/DL (ref 70–99)
HCT VFR BLD CALC: 38.4 % (ref 36–46)
HEMOGLOBIN: 12.7 G/DL (ref 12–16)
IMMATURE GRANULOCYTES: ABNORMAL %
LYMPHOCYTES # BLD: 22 % (ref 24–44)
MCH RBC QN AUTO: 29.5 PG (ref 26–34)
MCHC RBC AUTO-ENTMCNC: 33.1 G/DL (ref 31–37)
MCV RBC AUTO: 89.3 FL (ref 80–100)
MONOCYTES # BLD: 10 % (ref 1–7)
NRBC AUTOMATED: ABNORMAL PER 100 WBC
PDW BLD-RTO: 13.2 % (ref 11.5–14.9)
PLATELET # BLD: 228 K/UL (ref 150–450)
PLATELET ESTIMATE: ABNORMAL
PMV BLD AUTO: 7.6 FL (ref 6–12)
POTASSIUM SERPL-SCNC: 4.5 MMOL/L (ref 3.7–5.3)
RBC # BLD: 4.3 M/UL (ref 4–5.2)
RBC # BLD: ABNORMAL 10*6/UL
SEG NEUTROPHILS: 64 % (ref 36–66)
SEGMENTED NEUTROPHILS ABSOLUTE COUNT: 4.8 K/UL (ref 1.3–9.1)
SODIUM BLD-SCNC: 139 MMOL/L (ref 135–144)
WBC # BLD: 7.4 K/UL (ref 3.5–11)
WBC # BLD: ABNORMAL 10*3/UL

## 2021-11-29 PROCEDURE — 87077 CULTURE AEROBIC IDENTIFY: CPT

## 2021-11-29 PROCEDURE — 87186 SC STD MICRODIL/AGAR DIL: CPT

## 2021-11-29 PROCEDURE — G8417 CALC BMI ABV UP PARAM F/U: HCPCS | Performed by: STUDENT IN AN ORGANIZED HEALTH CARE EDUCATION/TRAINING PROGRAM

## 2021-11-29 PROCEDURE — 87086 URINE CULTURE/COLONY COUNT: CPT

## 2021-11-29 PROCEDURE — G8427 DOCREV CUR MEDS BY ELIG CLIN: HCPCS | Performed by: STUDENT IN AN ORGANIZED HEALTH CARE EDUCATION/TRAINING PROGRAM

## 2021-11-29 PROCEDURE — 99213 OFFICE O/P EST LOW 20 MIN: CPT | Performed by: STUDENT IN AN ORGANIZED HEALTH CARE EDUCATION/TRAINING PROGRAM

## 2021-11-29 PROCEDURE — 80048 BASIC METABOLIC PNL TOTAL CA: CPT

## 2021-11-29 PROCEDURE — G8400 PT W/DXA NO RESULTS DOC: HCPCS | Performed by: STUDENT IN AN ORGANIZED HEALTH CARE EDUCATION/TRAINING PROGRAM

## 2021-11-29 PROCEDURE — G8484 FLU IMMUNIZE NO ADMIN: HCPCS | Performed by: STUDENT IN AN ORGANIZED HEALTH CARE EDUCATION/TRAINING PROGRAM

## 2021-11-29 PROCEDURE — 86403 PARTICLE AGGLUT ANTBDY SCRN: CPT

## 2021-11-29 PROCEDURE — 4040F PNEUMOC VAC/ADMIN/RCVD: CPT | Performed by: STUDENT IN AN ORGANIZED HEALTH CARE EDUCATION/TRAINING PROGRAM

## 2021-11-29 PROCEDURE — 1090F PRES/ABSN URINE INCON ASSESS: CPT | Performed by: STUDENT IN AN ORGANIZED HEALTH CARE EDUCATION/TRAINING PROGRAM

## 2021-11-29 PROCEDURE — 36415 COLL VENOUS BLD VENIPUNCTURE: CPT

## 2021-11-29 PROCEDURE — 85025 COMPLETE CBC W/AUTO DIFF WBC: CPT

## 2021-11-29 PROCEDURE — 1036F TOBACCO NON-USER: CPT | Performed by: STUDENT IN AN ORGANIZED HEALTH CARE EDUCATION/TRAINING PROGRAM

## 2021-11-29 PROCEDURE — 1123F ACP DISCUSS/DSCN MKR DOCD: CPT | Performed by: STUDENT IN AN ORGANIZED HEALTH CARE EDUCATION/TRAINING PROGRAM

## 2021-11-29 RX ORDER — ZOLPIDEM TARTRATE 10 MG/1
10 TABLET ORAL NIGHTLY PRN
Qty: 14 TABLET | Refills: 0 | Status: SHIPPED | OUTPATIENT
Start: 2021-11-29 | End: 2021-12-13

## 2021-11-29 RX ORDER — AZITHROMYCIN 250 MG/1
250 TABLET, FILM COATED ORAL SEE ADMIN INSTRUCTIONS
Qty: 6 TABLET | Refills: 0 | Status: SHIPPED | OUTPATIENT
Start: 2021-11-29 | End: 2021-12-04

## 2021-11-29 ASSESSMENT — ENCOUNTER SYMPTOMS
COUGH: 0
SHORTNESS OF BREATH: 0
EYE DISCHARGE: 0
WHEEZING: 0
ABDOMINAL DISTENTION: 0
BACK PAIN: 0
RHINORRHEA: 0
ABDOMINAL PAIN: 0
EYE ITCHING: 0

## 2021-11-29 NOTE — FLOWSHEET NOTE
[] [facility]) [facility]  [address]    [phone]  F: [phone]   [] [address]  [facility]   [address]  P: [phone]  F: [phone]  [] [place]  [address]  P: [phone]  F: [phone] [] [address]  P: [phone]  F: [phone]  [] [address]  [address] [address] [facility] [address]   [address]   [place]: [phone]  F: [phone]   [] [place]  [address]  [place]: [phone]   F: [phone]     Physical Therapy Cancel/No Show note    Date: 2021  Patient: Tyler Banegas  : 1945  MRN: [id number]    Cancels/No Shows to date:     For today's appointment patient:    [x]  Cancelled    [] Rescheduled appointment    [] No-show     Reason given by patient:    []  Patient ill    []  Conflicting appointment    [] No transportation      [] Conflict with work    [] No reason given    [] Weather related    [] [id number]    [x] Other:      Comments:  Patient fell and injured herself.       [] Next appointment was confirmed    Electronically signed by: Shazia Brooks

## 2021-11-29 NOTE — PROGRESS NOTES
intubation     SMALL MOUTH AND AIRWAY    Fall     in bath tub 11-24-21, left knee warm and bruised, BRUISED TO CHEEKS AND NOSE, ARMS, KNEES AND LEFT THIGHS. Nicole Vega  GERD (gastroesophageal reflux disease)     History of atrial fibrillation     not chronic    Hypertension     Hypothyroid     Obesity     DAISY (obstructive sleep apnea)     NO MACHINE, she has bad sinus issues and requested alternatives instead of a machine, nothing was done    PAC (premature atrial contraction)     occasionally    Prolonged emergence from general anesthesia     Stress incontinence     Wears glasses     FOR READING. Past Surgical History:   Procedure Laterality Date    BACK SURGERY  2019    stem cell injection    BLADDER SURGERY  8/1993 and 10/2002    for bladder cancer treatment    BREAST BIOPSY      LT. BREAST (BENIGN). , large mass removed    CHOLECYSTECTOMY      COLONOSCOPY      3-4X    CORONARY ANGIOPLASTY  07/03/2014    3 stents inserted via heart cath    CYST REMOVAL Right     3RD FINGER.    CYSTOSCOPY  2014    Dr Lopez Rey  50-03[birth year]93    TURB-bladder tumors removed    CYSTOSCOPY  11/18/2016    fulgeration with biopsies    CYSTOSCOPY N/A 1/21/2020    CYSTO BOTOX INJECTION 100UNITS performed by Madhav Carbone MD at [facility] 11/17/2020    CYSTOSCOPY INJECTION BOTOX 100 UNITS performed by Madhav Carbone MD at [address] [place] [address] [place]IE[place].  EYES.    HEMORRHOID SURGERY      HYSTERECTOMY, TOTAL ABDOMINAL  1999    unsure if ovaries remain    KNEE SURGERY Bilateral 07/01/2020    stem cell injections    OTHER SURGICAL HISTORY      abscess on tailbone    TONSILLECTOMY         Family History   Problem Relation Age of Onset   [employer] Cancer Mother         pancreatic age 80    Other Mother         Vascular disease    Cancer Father         bladder age 80    Heart Disease Maternal Grandmother     Heart Disease Maternal Grandfather     Heart Disease Paternal Grandfather     Cancer Paternal Aunt         ovarian       Social History     Tobacco Use    Smoking status: Former Smoker     Packs/day: 1.50     Years: 30.00     Pack years: 45.00     Types: Cigarettes     Quit date: 1992     Years since quittin.9    Smokeless tobacco: Never Used   Substance Use Topics    Alcohol use: Yes     Comment: COUPLE DRINKS PER WEEK. Current Outpatient Medications   Medication Sig Dispense Refill    zolpidem (AMBIEN) 10 MG tablet Take 1 tablet by mouth nightly as needed for Sleep for up to 14 days. 14 tablet 0    azithromycin (ZITHROMAX) 250 MG tablet Take 1 tablet by mouth See Admin Instructions for 5 days 500mg on day 1 followed by 250mg on days 2 - 5 6 tablet 0    Skin Protectants, Misc. (EUCERIN) cream Apply topically as needed. 1 g 0    vitamin D (ERGOCALCIFEROL) 1.25 MG (13090 UT) CAPS capsule Take 1 capsule by mouth once a week 4 capsule 5    levothyroxine (SYNTHROID) 75 MCG tablet TAKE ONE TABLET BY MOUTH DAILY 90 tablet 1    rosuvastatin (CRESTOR) 20 MG tablet       losartan (COZAAR) 100 MG tablet TAKE ONE TABLET BY MOUTH DAILY 90 tablet 0    metoprolol tartrate (LOPRESSOR) 25 MG tablet TAKE ONE TABLET BY MOUTH TWICE A  tablet 2    furosemide (LASIX) 20 MG tablet TAKE ONE TABLET BY MOUTH DAILY AS NEEDED FOR PEDAL EDEMA 60 tablet 0    pantoprazole (PROTONIX) 40 MG tablet Take 40 mg by mouth daily       clotrimazole-betamethasone (LOTRISONE) 1-0.05 % cream Apply externally to affected area twice daily prn 45 g 1    aspirin 81 MG EC tablet Take 81 mg by mouth daily      nitroGLYCERIN (NITROSTAT) 0.4 MG SL tablet Place 0.4 mg under the tongue every 5 minutes as needed for Chest pain.  Multiple Vitamins-Minerals (THERAPEUTIC MULTIVITAMIN-MINERALS) tablet Take 1 tablet by mouth daily. No current facility-administered medications for this visit.      Allergies   Allergen Reactions    Atorvastatin Other (See Comments)     diarrhea    Other Other (See Comments) She is well-developed. HENT:      Head: Normocephalic and atraumatic. Eyes:      Conjunctiva/sclera: Conjunctivae normal.      Pupils: Pupils are equal, round, and reactive to light. Neck:      Thyroid: No thyromegaly. Vascular: No JVD. Cardiovascular:      Rate and Rhythm: Normal rate and regular rhythm. Heart sounds: Normal heart sounds. No murmur heard. Pulmonary:      Effort: Pulmonary effort is normal.      Breath sounds: Normal breath sounds. No wheezing. Abdominal:      General: Bowel sounds are normal. There is no distension. Palpations: Abdomen is soft. Tenderness: There is no abdominal tenderness. There is no rebound. Musculoskeletal:         General: No tenderness. Normal range of motion. Cervical back: Normal range of motion and neck supple. Skin:     Comments: Bruising over face  Bruising of left leg and right arm. Neurological:      Mental Status: She is alert and oriented to person, place, and time. Cranial Nerves: No cranial nerve deficit. Psychiatric:         Behavior: Behavior normal.       /88   Pulse 102   Resp 16   Wt 245 lb (111.1 kg)   SpO2 98%   BMI 41.40 kg/m²     Assessment:          1. Status post fall  Multiple bruises on left knee and right upper extremity. CT head and facial bones unremarkable for acute pathology. 2. Primary insomnia    - zolpidem (AMBIEN) 10 MG tablet; Take 1 tablet by mouth nightly as needed for Sleep for up to 14 days. Dispense: 14 tablet; Refill: 0    3. Other subacute sinusitis  - azithromycin (ZITHROMAX) 250 MG tablet; Take 1 tablet by mouth See Admin Instructions for 5 days 500mg on day 1 followed by 250mg on days 2 - 5  Dispense: 6 tablet; Refill: 0            Diagnosis Orders   1. Status post fall     2. Primary insomnia  zolpidem (AMBIEN) 10 MG tablet   3. Other subacute sinusitis  azithromycin (ZITHROMAX) 250 MG tablet           Plan:      Return in about 6 months (around 5/29/2022).     No orders of the defined types were placed in this encounter. Orders Placed This Encounter   Medications    zolpidem (AMBIEN) 10 MG tablet     Sig: Take 1 tablet by mouth nightly as needed for Sleep for up to 14 days. Dispense:  14 tablet     Refill:  0    azithromycin (ZITHROMAX) 250 MG tablet     Sig: Take 1 tablet by mouth See Admin Instructions for 5 days 500mg on day 1 followed by 250mg on days 2 - 5     Dispense:  6 tablet     Refill:  0         Patient given educational materials - see patient instructions. Discussed use, benefit, and side effects of prescribedmedications. All patient questions answered. Pt voiced understanding. Reviewed health maintenance. Instructed to continue current medications, diet and exercise. Patient agreed with treatment plan. Follow up as directed. I spent a total of 20-29 minutes face to face with this patient. Over 50% of that time was spent on counseling and care coordination. Please see assessment and plan for details. Electronically signed by   Ling Ritchie MD on 11/29/2021 at 2:29 PM  [facility]. Please note that this chart was generated using voice recognition Dragon dictation software. Although every effort was made to ensure the accuracy of this automatedtranscription, some errors in transcription may have occurred.

## 2021-11-29 NOTE — H&P (VIEW-ONLY)
HISTORY and [facility] [id number]       NAME:  Jonathan Ley  MRN: [id number]   YOB: 1945   Date: 11/29/2021   Age: 64 y.o. Gender: female       COMPLAINT AND PRESENT HISTORY:    Jonathan Ley is 64 y.o.,   female, undergoing preadmission testing for Urgency of urination, OAB  and History of bladder cancer. Pt c/o continued urgency, with incontinency and leaking with frequency. Patient admits to incomplete emptying. Pt admits to  wearing incontinent pads. Symptoms started in 1993 at that time was diagosed with bladder cancer. Patient was on Vesicare for some time and was stopped after her last Botox treatment. Patient has FH of Bladder Cancer in her Father. Patient will be having: CYSTOSCOPY BOTOX 100 UNITS on 12/10/2021. Patient has had previous Cystoscopy Botox x 2 injections done in 2020. Patient states that it worked well for her. Patient had recent PVR by US in office resulting in less than 15 ml. Pt denies any nausea,  vomiting or diaphoresis. No dysuria. No discoloration of the urine, no fever or chills. Patient has hx of bowel issues, particularly with diarrhea ; She states that she takes immodium every other day. Significant medical history:  CAD- with x3 stents. Atrial fibrillation - pt is presently on holter monitor that she has had on for 5 days now, HTN, HYPTHYROID, DAISY -no machine. Recent fall in bathtub- 5 days ago and did sustain bruising. Pt states that she will be seeing the doctor about her left  leg again. Patient is on Lopressor, Cozaar, lasix, synthroid, aspirin   pt sees Dr. Evaristo Soto, cardiology     Anticoagulants or blood thinners: Aspirin  Pt denies any  chest pain or  palpitations . No recent URI, SOB, fever or chills.  Patient is limping with ambulation    Lab Results   Component Value Date     11/29/2021    K 4.5 11/29/2021     11/29/2021    CO2 27 11/29/2021    BUN 20 11/29/2021    CREATININE 0.80 11/29/2021    GLUCOSE 118 11/29/2021    CALCIUM 9.4 11/29/2021      Patient had her LABS and EKG done. Patient has hx of prolonged emergence with anesthesia. PAST MEDICAL HISTORY     Past Medical History:   Diagnosis Date    AK (actinic keratosis)     from sun exposure    Anesthesia     HARD TIME WAKING UP \"SOMETIMES\".  Arthritis     Asymptomatic bilateral carotid artery stenosis 3/10/2015    Bladder cancer (HCC)     bladder, first time 1993    CAD (coronary artery disease)     Colon polyp     Dr Gutierrez Serve    Diarrhea     Difficult intubation     SMALL MOUTH AND AIRWAY    Fall     in bath tub 11-24-21, left knee warm and bruised, BRUISED TO CHEEKS AND NOSE, ARMS, KNEES AND LEFT THIGHS. Darrellanamarsha Melinda  GERD (gastroesophageal reflux disease)     History of atrial fibrillation     not chronic    Hypertension     Hypothyroid     Obesity     DAISY (obstructive sleep apnea)     NO MACHINE, she has bad sinus issues and requested alternatives instead of a machine, nothing was done    PAC (premature atrial contraction)     occasionally    Prolonged emergence from general anesthesia     Stress incontinence     Wears glasses     FOR READING. SURGICAL HISTORY       Past Surgical History:   Procedure Laterality Date    BACK SURGERY  2019    stem cell injection    BLADDER SURGERY  8/1993 and 10/2002    for bladder cancer treatment    BREAST BIOPSY      LT. BREAST (BENIGN). , large mass removed    CHOLECYSTECTOMY      COLONOSCOPY      3-4X    CORONARY ANGIOPLASTY  07/03/2014    3 stents inserted via heart cath    CYST REMOVAL Right     3RD FINGER.    CYSTOSCOPY  2014    Dr Nena Doe  [birth year]    TURB-bladder tumors removed    CYSTOSCOPY  11/18/2016    fulgeration with biopsies    CYSTOSCOPY N/A 1/21/2020    CYSTO BOTOX INJECTION 100UNITS performed by Sherri Akhtar MD at [facility] 11/17/2020    CYSTOSCOPY INJECTION BOTOX 100 UNITS performed by Sherri Akhtar MD at of Communication with Friends and Family: Not on file    Frequency of Social Gatherings with Friends and Family: Not on file    Attends Tenriism Services: Not on file    Active Member of Clubs or Organizations: Not on file    Attends Club or Organization Meetings: Not on file    Marital Status: Not on file   Intimate Partner Violence:     Fear of Current or Ex-Partner: Not on file    Emotionally Abused: Not on file    Physically Abused: Not on file    Sexually Abused: Not on file   Housing Stability:     Unable to Pay for Housing in the Last Year: Not on file    Number of [place] in the Last Year: Not on file    Unstable Housing in the Last Year: Not on file           REVIEW OF SYSTEMS      Allergies   Allergen Reactions    Atorvastatin Other (See Comments)     diarrhea    Other Other (See Comments)     Passed out and because of multiple issues with this combo drug, Penstrept was taken off market in 1960s, It contained Penicillin and Streptomycin in one pill. Patient states she has taken penicillin and/or streptomycin individually without problems.  Tobramycin Swelling    Molds & Smuts Other (See Comments)     coughing       Current Outpatient Medications on File Prior to Encounter   Medication Sig Dispense Refill    Skin Protectants, Misc. (EUCERIN) cream Apply topically as needed.  1 g 0    vitamin D (ERGOCALCIFEROL) 1.25 MG (71702 UT) CAPS capsule Take 1 capsule by mouth once a week 4 capsule 5    levothyroxine (SYNTHROID) 75 MCG tablet TAKE ONE TABLET BY MOUTH DAILY 90 tablet 1    rosuvastatin (CRESTOR) 20 MG tablet       losartan (COZAAR) 100 MG tablet TAKE ONE TABLET BY MOUTH DAILY 90 tablet 0    metoprolol tartrate (LOPRESSOR) 25 MG tablet TAKE ONE TABLET BY MOUTH TWICE A  tablet 2    furosemide (LASIX) 20 MG tablet TAKE ONE TABLET BY MOUTH DAILY AS NEEDED FOR PEDAL EDEMA 60 tablet 0    pantoprazole (PROTONIX) 40 MG tablet Take 40 mg by mouth daily       clotrimazole-betamethasone (LOTRISONE) 1-0.05 % cream Apply externally to affected area twice daily prn 45 g 1    aspirin 81 MG EC tablet Take 81 mg by mouth daily      nitroGLYCERIN (NITROSTAT) 0.4 MG SL tablet Place 0.4 mg under the tongue every 5 minutes as needed for Chest pain.  Multiple Vitamins-Minerals (THERAPEUTIC MULTIVITAMIN-MINERALS) tablet Take 1 tablet by mouth daily.  [DISCONTINUED] solifenacin (VESICARE) 5 MG tablet Take 5 mg by mouth daily. No current facility-administered medications on file prior to encounter. General health:  Fairly good. No fever or chills. Skin:  No itching, redness or rash. HEENT:  No headache, epistaxis or sore throat. Neck:  No pain, stiffness or masses. Cardiovascular/Respiratory system:  No chest pain, palpitation or shortness of breath. Gastrointestinal tract: No abdominal pain, Dysphagia, nausea, vomiting, diarrhea or constipation. Genitourinary:  No burning on micturition. No hesitancy, urgency, frequency or discoloration of urine. Locomotor:  See HPI. Neuropsychiatric:  No referable complaints. GENERAL PHYSICAL EXAM:     Vitals: BP (!) 149/66   Pulse 85   Temp 97.7 °F (36.5 °C) (Temporal)   Resp 18   Ht 5' 4.5\" (1.638 m)   Wt 240 lb (108.9 kg)   SpO2 96%   BMI 40.56 kg/m²  Body mass index is 40.56 kg/m². GENERAL APPEARANCE:   Cherry Kapadia is 64 y.o.,  female, severely obese, nourished, conscious, alert. Does not appear to be distress or pain at this time. SKIN:  Mild ecchymosis to the face and around eyes. Larger ecchymotic area to right shoulder that is showing signs of clearing. HEAD:  Normocephalic, atraumatic, no swelling or tenderness. EYES:  Pupils equal, reactive to light.            EARS:  No discharge, no marked hearing loss. NOSE:  No rhinorrhea, epistaxis or septal deformity. THROAT:  Not congested. No ulceration bleeding or discharge. NECK:  No stiffness, trachea central.                  CHEST:  Symmetrical and equal on expansion. HEART:  RRR S1 > S2. No audible murmurs or gallops. LUNGS:  Equal on expansion, normal breath sounds. No adventitious sounds. ABDOMEN:  Obese. Soft on palpation. No localized tenderness. No guarding or rigidity. LYMPHATICS:  No palpable cervical lymphadenopathy. LOCOMOTOR, BACK AND SPINE:   Tenderness to left knee. Patient is limping more on the left side with ambulation. No assistive device used. EXTREMITIES:  Symmetrical, mild pretibial edema. No calf tenderness. Patient has tender, redned, warm area left medial inner knee. NEUROLOGIC:  The patient is conscious, alert, oriented,Cranial nerve II-XII intact, taste and smell were not examined. No apparent focal sensory or motor deficits.                                                                                      PROVISIONAL DIAGNOSES / SURGERY:      CYSTOSCOPY BOTOX 100 UNITS    Urgency of urination     History of bladder cancer      Patient Active Problem List    Diagnosis Date Noted    Primary osteoarthritis of right knee 05/17/2020    Actinic keratosis 02/12/2020    Polyp of colon 02/12/2020    Gastroesophageal reflux disease 02/12/2020    Helton's esophagus without dysplasia 10/22/2019    Left ventricular hypertrophy 02/21/2019    Dyslipidemia 05/07/2018    Atherosclerosis of native coronary artery of native heart without angina pectoris 05/23/2017    Asymptomatic bilateral carotid artery stenosis 03/10/2015    Bladder cancer ([address] 08/25/2014    Malignant tumor of urinary bladder ([address] 08/25/2014    DDD (degenerative disc disease), lumbar 03/04/2014    DAISY (obstructive sleep apnea)     Hypothyroid     Irritable bowel syndrome     Obesity     Hypertension            DARA BRADFORD, APRN - CNP on 11/29/2021 at 2:22 PM

## 2021-11-29 NOTE — H&P
HISTORY and [facility] [id number]       NAME:  Rigo Nicolas  MRN: [id number]   YOB: 1945   Date: 11/29/2021   Age: 64 y.o. Gender: female       COMPLAINT AND PRESENT HISTORY:    Rigo Nicolas is 64 y.o.,   female, undergoing preadmission testing for Urgency of urination, OAB  and History of bladder cancer. Pt c/o continued urgency, with incontinency and leaking with frequency. Patient admits to incomplete emptying. Pt admits to  wearing incontinent pads. Symptoms started in 1993 at that time was diagosed with bladder cancer. Patient was on Vesicare for some time and was stopped after her last Botox treatment. Patient has FH of Bladder Cancer in her Father. Patient will be having: CYSTOSCOPY BOTOX 100 UNITS on 12/10/2021. Patient has had previous Cystoscopy Botox x 2 injections done in 2020. Patient states that it worked well for her. Patient had recent PVR by US in office resulting in less than 15 ml. Pt denies any nausea,  vomiting or diaphoresis. No dysuria. No discoloration of the urine, no fever or chills. Patient has hx of bowel issues, particularly with diarrhea ; She states that she takes immodium every other day. Significant medical history:  CAD- with x3 stents. Atrial fibrillation - pt is presently on holter monitor that she has had on for 5 days now, HTN, HYPTHYROID, DAISY -no machine. Recent fall in bathtub- 5 days ago and did sustain bruising. Pt states that she will be seeing the doctor about her left  leg again. Patient is on Lopressor, Cozaar, lasix, synthroid, aspirin   pt sees Dr. Irma Quiñones, cardiology     Anticoagulants or blood thinners: Aspirin  Pt denies any  chest pain or  palpitations . No recent URI, SOB, fever or chills.  Patient is limping with ambulation    Lab Results   Component Value Date     11/29/2021    K 4.5 11/29/2021     11/29/2021    CO2 27 11/29/2021    BUN 20 11/29/2021    CREATININE 0.80 11/29/2021    GLUCOSE 118 11/29/2021    CALCIUM 9.4 11/29/2021      Patient had her LABS and EKG done. Patient has hx of prolonged emergence with anesthesia. PAST MEDICAL HISTORY     Past Medical History:   Diagnosis Date    AK (actinic keratosis)     from sun exposure    Anesthesia     HARD TIME WAKING UP \"SOMETIMES\".  Arthritis     Asymptomatic bilateral carotid artery stenosis 3/10/2015    Bladder cancer (HCC)     bladder, first time 1993    CAD (coronary artery disease)     Colon polyp     Dr Fabi Hall    Diarrhea     Difficult intubation     SMALL MOUTH AND AIRWAY    Fall     in bath tub 11-24-21, left knee warm and bruised, BRUISED TO CHEEKS AND NOSE, ARMS, KNEES AND LEFT THIGHS. Patricia Bloodgood  GERD (gastroesophageal reflux disease)     History of atrial fibrillation     not chronic    Hypertension     Hypothyroid     Obesity     DAISY (obstructive sleep apnea)     NO MACHINE, she has bad sinus issues and requested alternatives instead of a machine, nothing was done    PAC (premature atrial contraction)     occasionally    Prolonged emergence from general anesthesia     Stress incontinence     Wears glasses     FOR READING. SURGICAL HISTORY       Past Surgical History:   Procedure Laterality Date    BACK SURGERY  2019    stem cell injection    BLADDER SURGERY  8/1993 and 10/2002    for bladder cancer treatment    BREAST BIOPSY      LT. BREAST (BENIGN). , large mass removed    CHOLECYSTECTOMY      COLONOSCOPY      3-4X    CORONARY ANGIOPLASTY  07/03/2014    3 stents inserted via heart cath    CYST REMOVAL Right     3RD FINGER.    CYSTOSCOPY  2014    Dr Nadeen Ramesh  [birth year]    TURB-bladder tumors removed    CYSTOSCOPY  11/18/2016    fulgeration with biopsies    CYSTOSCOPY N/A 1/21/2020    CYSTO BOTOX INJECTION 100UNITS performed by Lily Hairston [facility] at [facility] 11/17/2020    CYSTOSCOPY INJECTION BOTOX 100 UNITS performed by Lily Hairston MD at [facility]    EYE SURGERY      LASIK MACIEJ. EYES.    HEMORRHOID SURGERY      HYSTERECTOMY, TOTAL ABDOMINAL      unsure if ovaries remain    KNEE SURGERY Bilateral 2020    stem cell injections    OTHER SURGICAL HISTORY      abscess on tailbone    TONSILLECTOMY         FAMILY HISTORY       Family History   Problem Relation Age of Onset   [employer] Cancer Mother         pancreatic age 80    Other Mother         Vascular disease    Cancer Father         bladder age 80    Heart Disease Maternal Grandmother     Heart Disease Maternal Grandfather     Heart Disease Paternal Grandfather     Cancer Paternal Aunt         ovarian       SOCIAL HISTORY       Social History     Socioeconomic History    Marital status:      Spouse name: None    Number of children: None    Years of education: None    Highest education level: None   Occupational History    None   Tobacco Use    Smoking status: Former Smoker     Packs/day: 1.50     Years: 30.00     Pack years: 45.00     Types: Cigarettes     Quit date: 1992     Years since quittin.9    Smokeless tobacco: Never Used   Vaping Use    Vaping Use: Never used   Substance and Sexual Activity    Alcohol use: Yes     Comment: COUPLE DRINKS PER WEEK.  Drug use: No    Sexual activity: None   Other Topics Concern    None   Social History Narrative    None     Social Determinants of Health     Financial Resource Strain: Low Risk     Difficulty of Paying Living Expenses: Not hard at all   Food Insecurity: No Food Insecurity    Worried About Running Out of Food in the Last Year: Never true    Jeison of Food in the Last Year: Never true   Transportation Needs:     Lack of Transportation (Medical): Not on file    Lack of Transportation (Non-Medical):  Not on file   Physical Activity:     Days of Exercise per Week: Not on file    Minutes of Exercise per Session: Not on file   Stress:     Feeling of Stress : Not on file   Social Connections:     Frequency of Communication with Friends and Family: Not on file    Frequency of Social Gatherings with Friends and Family: Not on file    Attends Yarsani Services: Not on file    Active Member of Clubs or Organizations: Not on file    Attends Club or Organization Meetings: Not on file    Marital Status: Not on file   Intimate Partner Violence:     Fear of Current or Ex-Partner: Not on file    Emotionally Abused: Not on file    Physically Abused: Not on file    Sexually Abused: Not on file   Housing Stability:     Unable to Pay for Housing in the Last Year: Not on file    Number of [place] in the Last Year: Not on file    Unstable Housing in the Last Year: Not on file           REVIEW OF SYSTEMS      Allergies   Allergen Reactions    Atorvastatin Other (See Comments)     diarrhea    Other Other (See Comments)     Passed out and because of multiple issues with this combo drug, Penstrept was taken off market in 1960s, It contained Penicillin and Streptomycin in one pill. Patient states she has taken penicillin and/or streptomycin individually without problems.  Tobramycin Swelling    Molds & Smuts Other (See Comments)     coughing       Current Outpatient Medications on File Prior to Encounter   Medication Sig Dispense Refill    Skin Protectants, Misc. (EUCERIN) cream Apply topically as needed.  1 g 0    vitamin D (ERGOCALCIFEROL) 1.25 MG (00621 UT) CAPS capsule Take 1 capsule by mouth once a week 4 capsule 5    levothyroxine (SYNTHROID) 75 MCG tablet TAKE ONE TABLET BY MOUTH DAILY 90 tablet 1    rosuvastatin (CRESTOR) 20 MG tablet       losartan (COZAAR) 100 MG tablet TAKE ONE TABLET BY MOUTH DAILY 90 tablet 0    metoprolol tartrate (LOPRESSOR) 25 MG tablet TAKE ONE TABLET BY MOUTH TWICE A  tablet 2    furosemide (LASIX) 20 MG tablet TAKE ONE TABLET BY MOUTH DAILY AS NEEDED FOR PEDAL EDEMA 60 tablet 0    pantoprazole (PROTONIX) 40 MG tablet Take 40 mg by mouth daily       clotrimazole-betamethasone (LOTRISONE) 1-0.05 % cream Apply externally to affected area twice daily prn 45 g 1    aspirin 81 MG EC tablet Take 81 mg by mouth daily      nitroGLYCERIN (NITROSTAT) 0.4 MG SL tablet Place 0.4 mg under the tongue every 5 minutes as needed for Chest pain.  Multiple Vitamins-Minerals (THERAPEUTIC MULTIVITAMIN-MINERALS) tablet Take 1 tablet by mouth daily.  [DISCONTINUED] solifenacin (VESICARE) 5 MG tablet Take 5 mg by mouth daily. No current facility-administered medications on file prior to encounter. General health:  Fairly good. No fever or chills. Skin:  No itching, redness or rash. HEENT:  No headache, epistaxis or sore throat. Neck:  No pain, stiffness or masses. Cardiovascular/Respiratory system:  No chest pain, palpitation or shortness of breath. Gastrointestinal tract: No abdominal pain, Dysphagia, nausea, vomiting, diarrhea or constipation. Genitourinary:  No burning on micturition. No hesitancy, urgency, frequency or discoloration of urine. Locomotor:  See HPI. Neuropsychiatric:  No referable complaints. GENERAL PHYSICAL EXAM:     Vitals: BP (!) 149/66   Pulse 85   Temp 97.7 °F (36.5 °C) (Temporal)   Resp 18   Ht 5' 4.5\" (1.638 m)   Wt 240 lb (108.9 kg)   SpO2 96%   BMI 40.56 kg/m²  Body mass index is 40.56 kg/m². GENERAL APPEARANCE:   Rigo Nicolas is 64 y.o.,  female, severely obese, nourished, conscious, alert. Does not appear to be distress or pain at this time. SKIN:  Mild ecchymosis to the face and around eyes. Larger ecchymotic area to right shoulder that is showing signs of clearing. HEAD:  Normocephalic, atraumatic, no swelling or tenderness. EYES:  Pupils equal, reactive to light.            EARS:  No discharge, no marked hearing loss. NOSE:  No rhinorrhea, epistaxis or septal deformity. THROAT:  Not congested. No ulceration bleeding or discharge. NECK:  No stiffness, trachea central.                  CHEST:  Symmetrical and equal on expansion. HEART:  RRR S1 > S2. No audible murmurs or gallops. LUNGS:  Equal on expansion, normal breath sounds. No adventitious sounds. ABDOMEN:  Obese. Soft on palpation. No localized tenderness. No guarding or rigidity. LYMPHATICS:  No palpable cervical lymphadenopathy. LOCOMOTOR, BACK AND SPINE:   Tenderness to left knee. Patient is limping more on the left side with ambulation. No assistive device used. EXTREMITIES:  Symmetrical, mild pretibial edema. No calf tenderness. Patient has tender, redned, warm area left medial inner knee. NEUROLOGIC:  The patient is conscious, alert, oriented,Cranial nerve II-XII intact, taste and smell were not examined. No apparent focal sensory or motor deficits.                                                                                      PROVISIONAL DIAGNOSES / SURGERY:      CYSTOSCOPY BOTOX 100 UNITS    Urgency of urination     History of bladder cancer      Patient Active Problem List    Diagnosis Date Noted    Primary osteoarthritis of right knee 05/17/2020    Actinic keratosis 02/12/2020    Polyp of colon 02/12/2020    Gastroesophageal reflux disease 02/12/2020    Helton's esophagus without dysplasia 10/22/2019    Left ventricular hypertrophy 02/21/2019    Dyslipidemia 05/07/2018    Atherosclerosis of native coronary artery of native heart without angina pectoris 05/23/2017    Asymptomatic bilateral carotid artery stenosis 03/10/2015    Bladder cancer ([facility] [address] 08/25/2014    Malignant tumor of urinary bladder ([facility] [address] 08/25/2014    DDD (degenerative disc disease), lumbar 03/04/2014    DAISY (obstructive sleep apnea)     Hypothyroid     Irritable bowel syndrome     Obesity     Hypertension            DARA BRADFORD, APRN - CNP on 11/29/2021 at 2:22 PM

## 2021-11-30 LAB
CULTURE: ABNORMAL
CULTURE: ABNORMAL
Lab: ABNORMAL
SPECIMEN DESCRIPTION: ABNORMAL

## 2021-12-01 ENCOUNTER — HOSPITAL ENCOUNTER (OUTPATIENT)
Dept: PHYSICAL THERAPY | Facility: CLINIC | Age: 76
Setting detail: THERAPIES SERIES
Discharge: HOME | End: 2021-12-01
Payer: MEDICARE

## 2021-12-01 PROCEDURE — 97110 THERAPEUTIC EXERCISES: CPT

## 2021-12-01 RX ORDER — CEPHALEXIN 500 MG/1
500 CAPSULE ORAL 3 TIMES DAILY
Qty: 21 CAPSULE | Refills: 0 | Status: SHIPPED | OUTPATIENT
Start: 2021-12-01 | End: 2022-01-05

## 2021-12-01 NOTE — FLOWSHEET NOTE
[] [facility]) - [facility] &  Therapy  [address]  [phone]  F: [phone] [] [address]  [facility]   [address]  P: [phone]  F: [phone] [x] [address] &  Therapy  [address]  P: [phone]  F: [phone] [] [address]  P: [phone]  F: [phone] [] [address]  [facility]   [address]   [place]: [phone]  F: [phone]      Physical Therapy Daily Treatment/Discharge Note    Date:  2021  Patient Name:  Chapincito Valencia      :  1945  MRN: [id number]  Physician: Dr. Kelly Phillips/Dr. Nereida Schneider: Medicare  Medical Diagnosis: Jesse knee arthritis/LBP                        Rehab Codes: M25.561, M25.562, M25.662, M25.661, R26.89, M54.5  Onset date: 2011                          Next 's appt.:  [facility]; none with Dr Gillian Kirkpatrick  Visit# / total visits:      Cancels/No Shows: 1/0    Subjective: Pt arrives this morning as she fell last week. She went to ER and followed up with Dr. Evelio Mccurdy. Tests were neg for fx. As for her HEP, she hasn't done much since her fall. \"My endurance is crappy\". She still has not purchased a 5 lb cuff weight. She was doing her HEP on the floor, but recommended that she do it in bed.     Pain:  [] Yes  [x] No Location: L/R knee Pain Rating: (0-10 scale) 0/10 R knee; 2-3/10 L knee; 0-5/10 LB  Pain altered Tx:  [x] No  [] Yes  Action:  Comments:    Objective:  Modalities: Game Ready PRN  Precautions: severe OA in Jesse knees  Exercises:  Exercise Reps/ Time Weight/ Level  HEP Completed Comments   Scifit 10'  L2.0                      Supine             Quad sets    x      SAQ 25x 4 lbs x      SLR  2x10   x      SKTC 5x10\"       LTR 10x5\" Bridges 25x orange x      Sitting              LAQ 25x 4 lbs x      SL        Hip abduction x25 orange      Clamshells x25 orange         Gym             Sit to stands 10x2       Step ups 2x10 ea 6\"   Posterior and lateral   3 way hip 10x ea lime x     Calf stretch on SB 3x30\"  L5       Stool hip flexor  3x20\"          Lunges FW  15x   x      Resisted stepping 3L lime      Marching 3L // bars        Other:       Treatment Charges: Mins Units   []  Modalities     [x]  Ther Exercise 25 2   []  Manual Therapy     []  Ther Activities     []  Aquatics     []  Vasocompression     []  Other     Total Treatment time 25 2       Assessment: [x] Progressing toward goals. After review of her note from Dr. Zina Capone, she has end stage OA in her knees and needs TKAs. However, she feels that she needs to get them stronger before she has them replaced. R knee AROM is  deg; L 5-120 deg. Still needs to use her arms to get up out of a chair. LEFS score was 23/80 today. Her initial score was 25/80. Spent considerable time reviewing her HEP. [] No change. [] Other:  [x] Patient would continue to benefit from skilled physical therapy services in order to: increase L/R knee extension ROM and LE strength to decrease pain and improve function. STG: (to be met in 10 treatments)  1. ? Pain: as her activity level increases  2. ? ROM: achieve 0 deg of ext  3. ? Strength: able to perform 2x10\"   4. ? Function: LEFI <40% interference  5. Patient to be independent with home exercise program as demonstrated by performance with correct form without cues. 6.    LTG: (to be met in 20 treatments)  1. LEFI <30% interference  2. Able to sit stand transfer out of a chair without use of her arms for strength                    Patient goals: \"strengthen muscles in knee and LB area\"    Pt.  Education:  [x] Yes  [] No  [] Reviewed Prior HEP/Ed  Method of Education: [] Verbal  [x] Demo  [x] Written -reviewed with pt and she was satisfied with with the program.   Access Code: [email or web address]  URL: [email or web address]  Date: 12/01/2021  Prepared by: Cheri Maya    Exercises  Supine Quad Set - 2 x daily - 7 x weekly - 1 sets - 10 reps - 10 sec hold  Supine Knee Extension Strengthening - 2 x daily - 7 x weekly - 3 sets - 10 reps  Supine Heel Slide with Strap - 2 x daily - 7 x weekly - 3 sets - 10 reps  Supine Bridge - 2 x daily - 7 x weekly - 2 sets - 10 reps  Seated Long Arc Quad with Ankle Weight - 2 x daily - 7 x weekly - 2 sets - 10 reps  Sit to Stand - 2 x daily - 7 x weekly - 2 sets - 10 reps  Side Step Down with Counter Support - 2 x daily - 7 x weekly - 2 sets - 10 reps  Backward Step Down with Counter Support - 1 x daily - 7 x weekly - 3 sets - 10 reps  Sidelying Hip Abduction - 2 x daily - 7 x weekly - 2 sets - 10 reps  Supine Lower Trunk Rotation - 2 x daily - 7 x weekly - 2 sets - 10 reps    Comprehension of Education:  [x] Verbalizes understanding. [x] Demonstrates understanding. [] Needs review. [] Demonstrates/verbalizes HEP/Ed previously given. Plan: [] Continue current frequency toward long and short term goals. [x] Specific Instructions for subsequent treatments:  DC to HEP.          Time In: 1100 am            Time Out: 1130am    Electronically signed by:  Cheri Maya PT

## 2021-12-09 ENCOUNTER — ANESTHESIA EVENT (OUTPATIENT)
Dept: OPERATING ROOM | Age: 76
End: 2021-12-09
Payer: MEDICARE

## 2021-12-10 ENCOUNTER — ANESTHESIA (OUTPATIENT)
Dept: OPERATING ROOM | Age: 76
End: 2021-12-10
Payer: MEDICARE

## 2021-12-10 ENCOUNTER — HOSPITAL ENCOUNTER (OUTPATIENT)
Age: 76
Setting detail: OUTPATIENT SURGERY
Discharge: HOME | End: 2021-12-10
Attending: UROLOGY | Admitting: UROLOGY
Payer: MEDICARE

## 2021-12-10 VITALS — SYSTOLIC BLOOD PRESSURE: 138 MMHG | OXYGEN SATURATION: 99 % | DIASTOLIC BLOOD PRESSURE: 54 MMHG

## 2021-12-10 VITALS
OXYGEN SATURATION: 96 % | TEMPERATURE: 95.5 F | RESPIRATION RATE: 16 BRPM | SYSTOLIC BLOOD PRESSURE: 164 MMHG | WEIGHT: 240 LBS | DIASTOLIC BLOOD PRESSURE: 79 MMHG | BODY MASS INDEX: 39.99 KG/M2 | HEART RATE: 76 BPM | HEIGHT: 65 IN

## 2021-12-10 PROCEDURE — 2709999900 HC NON-CHARGEABLE SUPPLY: Performed by: UROLOGY

## 2021-12-10 PROCEDURE — 6360000002 HC RX W HCPCS: Performed by: UROLOGY

## 2021-12-10 PROCEDURE — 2580000003 HC RX 258: Performed by: ANESTHESIOLOGY

## 2021-12-10 PROCEDURE — 3700000000 HC ANESTHESIA ATTENDED CARE: Performed by: UROLOGY

## 2021-12-10 PROCEDURE — 3600000002 HC SURGERY LEVEL 2 BASE: Performed by: UROLOGY

## 2021-12-10 PROCEDURE — 7100000011 HC PHASE II RECOVERY - ADDTL 15 MIN: Performed by: UROLOGY

## 2021-12-10 PROCEDURE — 7100000010 HC PHASE II RECOVERY - FIRST 15 MIN: Performed by: UROLOGY

## 2021-12-10 PROCEDURE — 7100000030 HC ASPR PHASE II RECOVERY - FIRST 15 MIN: Performed by: UROLOGY

## 2021-12-10 PROCEDURE — 2500000003 HC RX 250 WO HCPCS: Performed by: NURSE ANESTHETIST, CERTIFIED REGISTERED

## 2021-12-10 PROCEDURE — 7100000000 HC PACU RECOVERY - FIRST 15 MIN: Performed by: UROLOGY

## 2021-12-10 PROCEDURE — 6360000002 HC RX W HCPCS: Performed by: NURSE ANESTHETIST, CERTIFIED REGISTERED

## 2021-12-10 PROCEDURE — 2580000003 HC RX 258: Performed by: UROLOGY

## 2021-12-10 PROCEDURE — 7100000001 HC PACU RECOVERY - ADDTL 15 MIN: Performed by: UROLOGY

## 2021-12-10 PROCEDURE — 3600000012 HC SURGERY LEVEL 2 ADDTL 15MIN: Performed by: UROLOGY

## 2021-12-10 PROCEDURE — 3700000001 HC ADD 15 MINUTES (ANESTHESIA): Performed by: UROLOGY

## 2021-12-10 PROCEDURE — 7100000031 HC ASPR PHASE II RECOVERY - ADDTL 15 MIN: Performed by: UROLOGY

## 2021-12-10 RX ORDER — FENTANYL CITRATE 50 UG/ML
25 INJECTION, SOLUTION INTRAMUSCULAR; INTRAVENOUS EVERY 5 MIN PRN
Status: DISCONTINUED | OUTPATIENT
Start: 2021-12-10 | End: 2021-12-10 | Stop reason: HOSPADM

## 2021-12-10 RX ORDER — MIDAZOLAM HYDROCHLORIDE 1 MG/ML
INJECTION INTRAMUSCULAR; INTRAVENOUS PRN
Status: DISCONTINUED | OUTPATIENT
Start: 2021-12-10 | End: 2021-12-10 | Stop reason: SDUPTHER

## 2021-12-10 RX ORDER — SODIUM CHLORIDE, SODIUM LACTATE, POTASSIUM CHLORIDE, CALCIUM CHLORIDE 600; 310; 30; 20 MG/100ML; MG/100ML; MG/100ML; MG/100ML
INJECTION, SOLUTION INTRAVENOUS CONTINUOUS
Status: DISCONTINUED | OUTPATIENT
Start: 2021-12-10 | End: 2021-12-10 | Stop reason: HOSPADM

## 2021-12-10 RX ORDER — SODIUM CHLORIDE 9 MG/ML
25 INJECTION, SOLUTION INTRAVENOUS PRN
Status: DISCONTINUED | OUTPATIENT
Start: 2021-12-10 | End: 2021-12-10 | Stop reason: HOSPADM

## 2021-12-10 RX ORDER — LIDOCAINE HYDROCHLORIDE 10 MG/ML
1 INJECTION, SOLUTION EPIDURAL; INFILTRATION; INTRACAUDAL; PERINEURAL
Status: DISCONTINUED | OUTPATIENT
Start: 2021-12-10 | End: 2021-12-10 | Stop reason: HOSPADM

## 2021-12-10 RX ORDER — FENTANYL CITRATE 50 UG/ML
INJECTION, SOLUTION INTRAMUSCULAR; INTRAVENOUS PRN
Status: DISCONTINUED | OUTPATIENT
Start: 2021-12-10 | End: 2021-12-10 | Stop reason: SDUPTHER

## 2021-12-10 RX ORDER — KETAMINE HYDROCHLORIDE 50 MG/ML
INJECTION, SOLUTION, CONCENTRATE INTRAMUSCULAR; INTRAVENOUS PRN
Status: DISCONTINUED | OUTPATIENT
Start: 2021-12-10 | End: 2021-12-10 | Stop reason: SDUPTHER

## 2021-12-10 RX ORDER — 0.9 % SODIUM CHLORIDE 0.9 %
500 INTRAVENOUS SOLUTION INTRAVENOUS
Status: DISCONTINUED | OUTPATIENT
Start: 2021-12-10 | End: 2021-12-10 | Stop reason: HOSPADM

## 2021-12-10 RX ORDER — PROMETHAZINE HYDROCHLORIDE 25 MG/ML
6.25 INJECTION, SOLUTION INTRAMUSCULAR; INTRAVENOUS
Status: DISCONTINUED | OUTPATIENT
Start: 2021-12-10 | End: 2021-12-10 | Stop reason: CLARIF

## 2021-12-10 RX ORDER — SODIUM CHLORIDE 0.9 % (FLUSH) 0.9 %
10 SYRINGE (ML) INJECTION PRN
Status: DISCONTINUED | OUTPATIENT
Start: 2021-12-10 | End: 2021-12-10 | Stop reason: HOSPADM

## 2021-12-10 RX ORDER — SODIUM CHLORIDE 9 MG/ML
INJECTION INTRAVENOUS PRN
Status: DISCONTINUED | OUTPATIENT
Start: 2021-12-10 | End: 2021-12-10 | Stop reason: ALTCHOICE

## 2021-12-10 RX ORDER — CEFAZOLIN SODIUM 1 G/3ML
INJECTION, POWDER, FOR SOLUTION INTRAMUSCULAR; INTRAVENOUS PRN
Status: DISCONTINUED | OUTPATIENT
Start: 2021-12-10 | End: 2021-12-10 | Stop reason: SDUPTHER

## 2021-12-10 RX ORDER — DIPHENHYDRAMINE HYDROCHLORIDE 50 MG/ML
12.5 INJECTION INTRAMUSCULAR; INTRAVENOUS
Status: DISCONTINUED | OUTPATIENT
Start: 2021-12-10 | End: 2021-12-10 | Stop reason: HOSPADM

## 2021-12-10 RX ORDER — HYDRALAZINE HYDROCHLORIDE 20 MG/ML
5 INJECTION INTRAMUSCULAR; INTRAVENOUS EVERY 10 MIN PRN
Status: DISCONTINUED | OUTPATIENT
Start: 2021-12-10 | End: 2021-12-10 | Stop reason: HOSPADM

## 2021-12-10 RX ORDER — OXYCODONE HYDROCHLORIDE AND ACETAMINOPHEN 5; 325 MG/1; MG/1
1 TABLET ORAL
Status: DISCONTINUED | OUTPATIENT
Start: 2021-12-10 | End: 2021-12-10 | Stop reason: HOSPADM

## 2021-12-10 RX ORDER — LABETALOL HYDROCHLORIDE 5 MG/ML
5 INJECTION, SOLUTION INTRAVENOUS EVERY 10 MIN PRN
Status: DISCONTINUED | OUTPATIENT
Start: 2021-12-10 | End: 2021-12-10 | Stop reason: HOSPADM

## 2021-12-10 RX ORDER — PROPOFOL 10 MG/ML
INJECTION, EMULSION INTRAVENOUS CONTINUOUS PRN
Status: DISCONTINUED | OUTPATIENT
Start: 2021-12-10 | End: 2021-12-10 | Stop reason: SDUPTHER

## 2021-12-10 RX ORDER — METOCLOPRAMIDE HYDROCHLORIDE 5 MG/ML
10 INJECTION INTRAMUSCULAR; INTRAVENOUS
Status: DISCONTINUED | OUTPATIENT
Start: 2021-12-10 | End: 2021-12-10 | Stop reason: HOSPADM

## 2021-12-10 RX ADMIN — FENTANYL CITRATE 25 MCG: 50 INJECTION, SOLUTION INTRAMUSCULAR; INTRAVENOUS at 08:12

## 2021-12-10 RX ADMIN — KETAMINE HYDROCHLORIDE 10 MG: 50 INJECTION, SOLUTION INTRAMUSCULAR; INTRAVENOUS at 07:56

## 2021-12-10 RX ADMIN — SODIUM CHLORIDE, POTASSIUM CHLORIDE, SODIUM LACTATE AND CALCIUM CHLORIDE: 600; 310; 30; 20 INJECTION, SOLUTION INTRAVENOUS at 06:36

## 2021-12-10 RX ADMIN — PROPOFOL 75 MCG/KG/MIN: 10 INJECTION, EMULSION INTRAVENOUS at 07:54

## 2021-12-10 RX ADMIN — MIDAZOLAM 1 MG: 1 INJECTION INTRAMUSCULAR; INTRAVENOUS at 07:47

## 2021-12-10 RX ADMIN — MIDAZOLAM 1 MG: 1 INJECTION INTRAMUSCULAR; INTRAVENOUS at 07:49

## 2021-12-10 RX ADMIN — FENTANYL CITRATE 25 MCG: 50 INJECTION, SOLUTION INTRAMUSCULAR; INTRAVENOUS at 07:54

## 2021-12-10 RX ADMIN — CEFAZOLIN 2000 MG: 1 INJECTION, POWDER, FOR SOLUTION INTRAMUSCULAR; INTRAVENOUS at 08:02

## 2021-12-10 RX ADMIN — KETAMINE HYDROCHLORIDE 10 MG: 50 INJECTION, SOLUTION INTRAMUSCULAR; INTRAVENOUS at 08:05

## 2021-12-10 ASSESSMENT — PAIN - FUNCTIONAL ASSESSMENT: PAIN_FUNCTIONAL_ASSESSMENT: 0-10

## 2021-12-10 ASSESSMENT — PULMONARY FUNCTION TESTS
PIF_VALUE: 0
PIF_VALUE: 1
PIF_VALUE: 0
PIF_VALUE: 0
PIF_VALUE: 1
PIF_VALUE: 1
PIF_VALUE: 0
PIF_VALUE: 1
PIF_VALUE: 0
PIF_VALUE: 1
PIF_VALUE: 1
PIF_VALUE: 0
PIF_VALUE: 1
PIF_VALUE: 0
PIF_VALUE: 0
PIF_VALUE: 1
PIF_VALUE: 0
PIF_VALUE: 1
PIF_VALUE: 1
PIF_VALUE: 0
PIF_VALUE: 1
PIF_VALUE: 0
PIF_VALUE: 1
PIF_VALUE: 0

## 2021-12-10 ASSESSMENT — PAIN SCALES - GENERAL
PAINLEVEL_OUTOF10: 0

## 2021-12-10 NOTE — BRIEF OP NOTE
Brief Postoperative Note      Patient: Alan Cr  YOB: 1945  MRN: [id number]    Date of Procedure: 12/10/2021    Pre-Op Diagnosis: URGENCY OF URINATION  PT FULLY VACCINATED    Post-Op Diagnosis: Same       Procedure(s):  CYSTOSCOPY BOTOX 100 UNITS    Surgeon(s):  Selam Hobbs MD    Assistant:  * No surgical staff found *    Anesthesia: Monitor Anesthesia Care    Estimated Blood Loss (mL): Minimal    Complications: None    Specimens:   * No specimens in log *    Implants:  * No implants in log *      Drains: * No LDAs found *    Findings: 100 Units botox injected.      Electronically signed by Selam Hobbs MD on 12/10/2021 at 8:16 AM

## 2021-12-10 NOTE — ANESTHESIA PRE PROCEDURE
Department of Anesthesiology  Preprocedure Note       Name:  Deshaun Abebe   Age:  64 y.o.  :  1945                                          MRN:  [id number]         Date:  12/10/2021      Surgeon: Tati Flores):  Jessica Doyle MD    Procedure: Procedure(s):  CYSTOSCOPY BOTOX 100 UNITS    Medications prior to admission:   Prior to Admission medications    Medication Sig Start Date End Date Taking? Authorizing Provider   cephALEXin (KEFLEX) 500 MG capsule Take 1 capsule by mouth 3 times daily 21   HARESH Gutierrez - CNP   zolpidem (AMBIEN) 10 MG tablet Take 1 tablet by mouth nightly as needed for Sleep for up to 14 days. 21  César Araujo MD   Skin Protectants, Misc. (EUCERIN) cream Apply topically as needed. 21   César Araujo MD   vitamin D (ERGOCALCIFEROL) 1.25 MG (50916 UT) CAPS capsule Take 1 capsule by mouth once a week 21   César Araujo MD   levothyroxine (SYNTHROID) 75 MCG tablet TAKE ONE TABLET BY MOUTH DAILY 21   HARESH Alberto - CNP   rosuvastatin (CRESTOR) 20 MG tablet  21   Historical Provider, MD   losartan (COZAAR) 100 MG tablet TAKE ONE TABLET BY MOUTH DAILY 3/5/21   Nancy Pedro MD   metoprolol tartrate (LOPRESSOR) 25 MG tablet TAKE ONE TABLET BY MOUTH TWICE A DAY 21   Nancy Pedro MD   furosemide (LASIX) 20 MG tablet TAKE ONE TABLET BY MOUTH DAILY AS NEEDED FOR PEDAL EDEMA 20   Nancy Pedro MD   pantoprazole (PROTONIX) 40 MG tablet Take 40 mg by mouth daily  19   Historical Provider, MD   clotrimazole-betamethasone (Pablo Kail) 1-0.05 % cream Apply externally to affected area twice daily prn 19   Jessica Doyle MD   aspirin 81 MG EC tablet Take 81 mg by mouth daily    Historical Provider, MD   nitroGLYCERIN (NITROSTAT) 0.4 MG SL tablet Place 0.4 mg under the tongue every 5 minutes as needed for Chest pain.     Historical Provider, MD   Multiple Vitamins-Minerals (THERAPEUTIC MULTIVITAMIN-MINERALS) tablet Take 1 tablet by mouth daily. Historical Provider, MD   solifenacin (VESICARE) 5 MG tablet Take 5 mg by mouth daily. 11/29/21  Historical Provider, MD       Current medications:    No current facility-administered medications for this visit. No current outpatient medications on file. Facility-Administered Medications Ordered in Other Visits   Medication Dose Route Frequency Provider Last Rate Last Admin    lactated ringers infusion   IntraVENous Continuous Chad Caraballo  mL/hr at 12/10/21 0636 New Bag at 12/10/21 0636    sodium chloride flush 0.9 % injection 10 mL  10 mL IntraVENous PRN Darshana Pérez MD        0.9 % sodium chloride infusion  25 mL IntraVENous PRN Darshana Pérez MD        lidocaine PF 1 % injection 1 mL  1 mL IntraDERmal Once PRN Darshana Pérez MD        onabotulinumtoxin A (BOTOX) injection 100 Units  100 Units IntraMUSCular Once Marthe Denver, MD           Allergies: Allergies   Allergen Reactions    Atorvastatin Other (See Comments)     diarrhea    Other Other (See Comments)     Passed out and because of multiple issues with this combo drug, Penstrept was taken off market in 1960s, It contained Penicillin and Streptomycin in one pill. Patient states she has taken penicillin and/or streptomycin individually without problems.     Tobramycin Swelling    Molds & Smuts Other (See Comments)     coughing       Problem List:    Patient Active Problem List   Diagnosis Code    Irritable bowel syndrome K58.9    Obesity E66.9    Hypertension I10    DAISY (obstructive sleep apnea) G47.33    Hypothyroid E03.9    DDD (degenerative disc disease), lumbar M51.36    Bladder cancer (HCC) C67.9    Asymptomatic bilateral carotid artery stenosis I65.23    Dyslipidemia E78.5    Left ventricular hypertrophy I51.7    Actinic keratosis L57.0    Malignant tumor of urinary bladder (HCC) C67.9    Atherosclerosis of native coronary artery of native heart without angina pectoris I25.10    Polyp of colon K63.5    Helton's esophagus without dysplasia K22.70    Gastroesophageal reflux disease K21.9    Primary osteoarthritis of right knee M17.11       Past Medical History:        Diagnosis Date    AK (actinic keratosis)     from sun exposure    Anesthesia     HARD TIME WAKING UP \"SOMETIMES\".  Arthritis     Asymptomatic bilateral carotid artery stenosis 3/10/2015    Bladder cancer (HCC)     bladder, first time 1993    CAD (coronary artery disease)     Colon polyp     Dr Amira Funk    Diarrhea     Difficult intubation     SMALL MOUTH AND AIRWAY    Fall     in bath tub 11-24-21, left knee warm and bruised, BRUISED TO CHEEKS AND NOSE, ARMS, KNEES AND LEFT THIGHS. Lawernce Roughen  GERD (gastroesophageal reflux disease)     History of atrial fibrillation     not chronic    Hypertension     Hypothyroid     Obesity     DAISY (obstructive sleep apnea)     NO MACHINE, she has bad sinus issues and requested alternatives instead of a machine, nothing was done    PAC (premature atrial contraction)     occasionally    Prolonged emergence from general anesthesia     Stress incontinence     Wears glasses     FOR READING. Past Surgical History:        Procedure Laterality Date    BACK SURGERY  2019    stem cell injection    BLADDER SURGERY  8/1993 and 10/2002    for bladder cancer treatment    BREAST BIOPSY      LT. BREAST (BENIGN). , large mass removed    CHOLECYSTECTOMY      COLONOSCOPY      3-4X    CORONARY ANGIOPLASTY  07/03/2014    3 stents inserted via heart cath    CYST REMOVAL Right     3RD FINGER.    CYSTOSCOPY  2014    Dr Aida Mcintosh  53-08-91    TURB-bladder tumors removed    CYSTOSCOPY  11/18/2016    fulgeration with biopsies    CYSTOSCOPY N/A 1/21/2020    CYSTO BOTOX INJECTION 100UNITS performed by Lea Rankin MD at [facility] 11/17/2020    CYSTOSCOPY INJECTION BOTOX 100 UNITS performed by Lea Rankin MD at [address] [place] [address] LASIK MACIEJ. EYES.    HEMORRHOID SURGERY      HYSTERECTOMY, TOTAL ABDOMINAL      unsure if ovaries remain    KNEE SURGERY Bilateral 2020    stem cell injections    OTHER SURGICAL HISTORY      abscess on tailbone    TONSILLECTOMY         Social History:    Social History     Tobacco Use    Smoking status: Former Smoker     Packs/day: 1.50     Years: 30.00     Pack years: 45.00     Types: Cigarettes     Quit date: 1992     Years since quittin.9    Smokeless tobacco: Never Used   Substance Use Topics    Alcohol use: Yes     Comment: COUPLE DRINKS PER WEEK. Counseling given: Not Answered      Vital Signs (Current): There were no vitals filed for this visit. BP Readings from Last 3 Encounters:   12/10/21 (!) 158/84   21 (!) 149/66   21 136/88       NPO Status:                                                                                 BMI:   Wt Readings from Last 3 Encounters:   12/10/21 240 lb (108.9 kg)   21 240 lb (108.9 kg)   21 245 lb (111.1 kg)     There is no height or weight on file to calculate BMI.    CBC:   Lab Results   Component Value Date    WBC 7.4 2021    RBC 4.30 2021    HGB 12.7 2021    HCT 38.4 2021    MCV 89.3 2021    RDW 13.2 2021     2021       CMP:   Lab Results   Component Value Date     2021    K 4.5 2021     2021    CO2 27 2021    BUN 20 2021    CREATININE 0.80 2021    GFRAA >60 2021    LABGLOM >60 2021    GLUCOSE 118 2021    CALCIUM 9.4 2021       POC Tests: No results for input(s): POCGLU, POCNA, POCK, POCCL, POCBUN, POCHEMO, POCHCT in the last 72 hours.     Coags: No results found for: PROTIME, INR, APTT    HCG (If Applicable): No results found for: PREGTESTUR, PREGSERUM, HCG, HCGQUANT     ABGs: No results found for: PHART, PO2ART, AOU9OTJ, KHJ8CSJ, BEART, Q7STCZZI     Type & Screen (If Applicable):  No results found for: LABABO, LABRH    Drug/Infectious Status (If Applicable):  No results found for: HIV, HEPCAB    COVID-19 Screening (If Applicable):   Lab Results   Component Value Date    COVID19 Not Detected 11/13/2020         Anesthesia Evaluation  Patient summary reviewed and Nursing notes reviewed   history of anesthetic complications: difficult airway. Airway: Mallampati: III  TM distance: <3 FB   Neck ROM: full  Mouth opening: > = 3 FB Dental: normal exam         Pulmonary:normal exam  breath sounds clear to auscultation  (+) sleep apnea: on noncompliant,                             Cardiovascular:    (+) hypertension:, CAD:, CABG/stent: no interval change, dysrhythmias: atrial fibrillation, hyperlipidemia      ECG reviewed  Rhythm: regular  Rate: normal  Echocardiogram reviewed         Beta Blocker:  Dose within 24 Hrs      ROS comment: Holter monitor     Neuro/Psych:   Negative Neuro/Psych ROS              GI/Hepatic/Renal:   (+) GERD:, morbid obesity          Endo/Other:    (+) hypothyroidism::., malignancy/cancer (bladder). ROS comment: Recent mechanical fall with multiple ecchymosis Abdominal:             Vascular:           ROS comment: B/l carotid artery stenosis. Other Findings:               Anesthesia Plan      MAC     ASA 3       Induction: intravenous. MIPS: Postoperative opioids intended and Prophylactic antiemetics administered. Anesthetic plan and risks discussed with patient. Plan discussed with CRNA.                   Bryson Hammonds MD   12/10/2021

## 2021-12-10 NOTE — INTERVAL H&P NOTE
Update History & Physical    The patient's History and Physical of November 29, 2021 was reviewed with the patient and I examined the patient. There was no change. The surgical site was confirmed by the patient and me. Pt will have Cystoscopy Botox 100 units injection Per Dr Geovanny Wagner. Pt denies fever/chills, chest pain or SOB  Pt NPO since the past midnight, pt took lopressor , and cozaar today with sip of water  Pt stopped taking ASA 81 mg one week ago   Denies hx MRSA infection   Patient has hx of prolonged emergence with anesthesia  See nursing flow sheet for vital signs     Lab Results   Component Value Date    WBC 7.4 11/29/2021    HGB 12.7 11/29/2021    HCT 38.4 11/29/2021    MCV 89.3 11/29/2021     11/29/2021     Lab Results   Component Value Date     11/29/2021    K 4.5 11/29/2021     11/29/2021    CO2 27 11/29/2021    BUN 20 11/29/2021    CREATININE 0.80 11/29/2021    GLUCOSE 118 11/29/2021    CALCIUM 9.4 11/29/2021        Plan: The risks, benefits, expected outcome, and alternative to the recommended procedure have been discussed with the patient. Patient understands and wants to proceed with the procedure.      Electronically signed by HARESH Caputo CNP on 12/10/2021 at 5:51 AM

## 2021-12-20 ENCOUNTER — TELEPHONE (OUTPATIENT)
Dept: UROLOGY | Age: 76
End: 2021-12-20

## 2022-01-05 ENCOUNTER — OFFICE VISIT (OUTPATIENT)
Dept: PRIMARY CARE CLINIC | Age: 77
End: 2022-01-05
Payer: MEDICARE

## 2022-01-05 VITALS
DIASTOLIC BLOOD PRESSURE: 80 MMHG | HEART RATE: 80 BPM | SYSTOLIC BLOOD PRESSURE: 124 MMHG | OXYGEN SATURATION: 97 % | BODY MASS INDEX: 42.09 KG/M2 | WEIGHT: 245.2 LBS

## 2022-01-05 DIAGNOSIS — E03.9 ACQUIRED HYPOTHYROIDISM: ICD-10-CM

## 2022-01-05 DIAGNOSIS — I10 PRIMARY HYPERTENSION: ICD-10-CM

## 2022-01-05 DIAGNOSIS — Z91.81 STATUS POST FALL: ICD-10-CM

## 2022-01-05 DIAGNOSIS — S09.92XD INJURY OF NOSE, SUBSEQUENT ENCOUNTER: Primary | ICD-10-CM

## 2022-01-05 DIAGNOSIS — E78.5 DYSLIPIDEMIA: ICD-10-CM

## 2022-01-05 DIAGNOSIS — C67.9 MALIGNANT NEOPLASM OF URINARY BLADDER, UNSPECIFIED SITE (HCC): ICD-10-CM

## 2022-01-05 PROBLEM — S09.92XA INJURY OF NOSE: Status: ACTIVE | Noted: 2022-01-05

## 2022-01-05 PROCEDURE — G8427 DOCREV CUR MEDS BY ELIG CLIN: HCPCS | Performed by: STUDENT IN AN ORGANIZED HEALTH CARE EDUCATION/TRAINING PROGRAM

## 2022-01-05 PROCEDURE — 1090F PRES/ABSN URINE INCON ASSESS: CPT | Performed by: STUDENT IN AN ORGANIZED HEALTH CARE EDUCATION/TRAINING PROGRAM

## 2022-01-05 PROCEDURE — 99213 OFFICE O/P EST LOW 20 MIN: CPT | Performed by: STUDENT IN AN ORGANIZED HEALTH CARE EDUCATION/TRAINING PROGRAM

## 2022-01-05 PROCEDURE — G8400 PT W/DXA NO RESULTS DOC: HCPCS | Performed by: STUDENT IN AN ORGANIZED HEALTH CARE EDUCATION/TRAINING PROGRAM

## 2022-01-05 PROCEDURE — 1036F TOBACCO NON-USER: CPT | Performed by: STUDENT IN AN ORGANIZED HEALTH CARE EDUCATION/TRAINING PROGRAM

## 2022-01-05 PROCEDURE — G8417 CALC BMI ABV UP PARAM F/U: HCPCS | Performed by: STUDENT IN AN ORGANIZED HEALTH CARE EDUCATION/TRAINING PROGRAM

## 2022-01-05 PROCEDURE — 4040F PNEUMOC VAC/ADMIN/RCVD: CPT | Performed by: STUDENT IN AN ORGANIZED HEALTH CARE EDUCATION/TRAINING PROGRAM

## 2022-01-05 PROCEDURE — G8484 FLU IMMUNIZE NO ADMIN: HCPCS | Performed by: STUDENT IN AN ORGANIZED HEALTH CARE EDUCATION/TRAINING PROGRAM

## 2022-01-05 PROCEDURE — 1123F ACP DISCUSS/DSCN MKR DOCD: CPT | Performed by: STUDENT IN AN ORGANIZED HEALTH CARE EDUCATION/TRAINING PROGRAM

## 2022-01-05 ASSESSMENT — ENCOUNTER SYMPTOMS
EYE ITCHING: 0
SHORTNESS OF BREATH: 0
EYE DISCHARGE: 0
ABDOMINAL PAIN: 0
ABDOMINAL DISTENTION: 0
RHINORRHEA: 0
COUGH: 0
BACK PAIN: 0
WHEEZING: 0

## 2022-01-05 NOTE — PROGRESS NOTES
[facility] PR[facility]  [address]   [address]  [address] [address]  Dept: [phone]  Dept Fax: [phone]    Krystian Kendrick is a 64 y.o. female who presents today for her medical conditions/complaints as noted below. Chief Complaint   Patient presents with    Annual Exam     3mo f/u, pt states nose has not been the same since fall    Referral - General     discuss ENT referral       HPI:     64 y. o. F presented to office today as a follow-up visit. Patient had a fall in November when she was evaluated in ER and all her work-up including CT head, CT facial bones was unremarkable for any fracture. Patient still feels that her nose is tender and has episodes of bleeding from nose on one occasion. Requested referral to ENT. Other chronic issues including hypertension, hypothyroidism, hyperlipidemia, vitamin D deficiency and bladder cancer have been stable  No other current complaints. Vital signs stable. Advised lifestyle changes. Medications reviewed and refilled as appropriate, problem list updated. All concerns discussed in details and all questions answered to patient satisfaction. Hemoglobin A1C (%)   Date Value   12/14/2020 5.7             ( goal A1C is < 7)   No results found for: LABMICR  LDL Cholesterol (mg/dL)   Date Value   12/14/2020 117     LDL Calculated (mg/dL)   Date Value   01/13/2017 129   10/06/2015 83   08/20/2014 66       (goal LDL is <100)   BUN (mg/dL)   Date Value   11/29/2021 20     BP Readings from Last 3 Encounters:   01/05/22 124/80   12/17/21 102/62   12/10/21 (!) 138/54          (goal 120/80)    Past Medical History:   Diagnosis Date    AK (actinic keratosis)     from sun exposure    Anesthesia     HARD TIME WAKING UP \"SOMETIMES\".     Arthritis     Asymptomatic bilateral carotid artery stenosis 3/10/2015    Bladder cancer (HCC)     bladder, first time 1993    CAD (coronary artery disease)     Colon polyp     Dr Gaxiola Said    Diarrhea     Difficult intubation     SMALL MOUTH AND AIRWAY    Fall     in bath tub 11-24-21, left knee warm and bruised, BRUISED TO CHEEKS AND NOSE, ARMS, KNEES AND LEFT THIGHS. Chitra Slater  GERD (gastroesophageal reflux disease)     History of atrial fibrillation     not chronic    Hypertension     Hypothyroid     Obesity     DAISY (obstructive sleep apnea)     NO MACHINE, she has bad sinus issues and requested alternatives instead of a machine, nothing was done    PAC (premature atrial contraction)     occasionally    Prolonged emergence from general anesthesia     Stress incontinence     Wears glasses     FOR READING. Past Surgical History:   Procedure Laterality Date    BACK SURGERY  2019    stem cell injection    BLADDER SURGERY  8/1993 and 10/2002    for bladder cancer treatment    BREAST BIOPSY      LT. BREAST (BENIGN). , large mass removed    CHOLECYSTECTOMY      COLONOSCOPY      3-4X    CORONARY ANGIOPLASTY  07/03/2014    3 stents inserted via heart cath    CYST REMOVAL Right     3RD FINGER.    CYSTOSCOPY  2014    Dr Laverne Funk  [birth year]    TURB-bladder tumors removed    CYSTOSCOPY  11/18/2016    fulgeration with biopsies    CYSTOSCOPY N/A 1/21/2020    CYSTO BOTOX INJECTION 100UNITS performed by Red Batres MD at [facility] 11/17/2020    CYSTOSCOPY INJECTION BOTOX 100 UNITS performed by Red Batres MD at [facility] 12/10/2021    CYSTOSCOPY BOTOX 100 UNITS performed by Red Batres MD at [address] [place] [address]  EYES.    HEMORRHOID SURGERY      HYSTERECTOMY, TOTAL ABDOMINAL  1999    unsure if ovaries remain    KNEE SURGERY Bilateral 07/01/2020    stem cell injections    OTHER SURGICAL HISTORY      abscess on tailbone    TONSILLECTOMY         Family History   Problem Relation Age of Onset   Vance Layer Cancer Mother         pancreatic age 80    Other Mother         Vascular disease    Cancer Father bladder age 80    Heart Disease Maternal Grandmother     Heart Disease Maternal Grandfather     Heart Disease Paternal Grandfather     Cancer Paternal Aunt         ovarian       Social History     Tobacco Use    Smoking status: Former Smoker     Packs/day: 1.50     Years: 30.00     Pack years: 45.00     Types: Cigarettes     Quit date: 1992     Years since quittin.0    Smokeless tobacco: Never Used   Substance Use Topics    Alcohol use: Yes     Comment: COUPLE DRINKS PER WEEK. Current Outpatient Medications   Medication Sig Dispense Refill    levothyroxine (SYNTHROID) 75 MCG tablet TAKE ONE TABLET BY MOUTH DAILY 90 tablet 3    Skin Protectants, Misc. (EUCERIN) cream Apply topically as needed. 1 g 0    vitamin D (ERGOCALCIFEROL) 1.25 MG (71461 UT) CAPS capsule Take 1 capsule by mouth once a week 4 capsule 5    rosuvastatin (CRESTOR) 20 MG tablet       losartan (COZAAR) 100 MG tablet TAKE ONE TABLET BY MOUTH DAILY 90 tablet 0    metoprolol tartrate (LOPRESSOR) 25 MG tablet TAKE ONE TABLET BY MOUTH TWICE A  tablet 2    furosemide (LASIX) 20 MG tablet TAKE ONE TABLET BY MOUTH DAILY AS NEEDED FOR PEDAL EDEMA 60 tablet 0    pantoprazole (PROTONIX) 40 MG tablet Take 40 mg by mouth daily       clotrimazole-betamethasone (LOTRISONE) 1-0.05 % cream Apply externally to affected area twice daily prn 45 g 1    aspirin 81 MG EC tablet Take 81 mg by mouth daily      nitroGLYCERIN (NITROSTAT) 0.4 MG SL tablet Place 0.4 mg under the tongue every 5 minutes as needed for Chest pain.  Multiple Vitamins-Minerals (THERAPEUTIC MULTIVITAMIN-MINERALS) tablet Take 1 tablet by mouth daily. No current facility-administered medications for this visit.      Allergies   Allergen Reactions    Atorvastatin Other (See Comments)     diarrhea    Other Other (See Comments)     Passed out and because of multiple issues with this combo drug, Penstrept was taken off market in 1960s, It contained Penicillin and Streptomycin in one pill. Patient states she has taken penicillin and/or streptomycin individually without problems.  Tobramycin Swelling    Molds & Smuts Other (See Comments)     coughing       Health Maintenance   Topic Date Due    Hepatitis C screen  Never done    DTaP/Tdap/Td vaccine (1 - Tdap) Never done    Shingles Vaccine (1 of 2) Never done    Pneumococcal 65+ years Vaccine (1 of 1 - PPSV23) Never done    Lipid screen  12/14/2021    Flu vaccine (1) 11/29/2022 (Originally 9/1/2021)    TSH testing  09/24/2022    Depression Screen  09/28/2022    Annual Wellness Visit (AWV)  09/29/2022    Potassium monitoring  11/29/2022    Creatinine monitoring  11/29/2022    COVID-19 Vaccine  Completed    DEXA (modify frequency per FRAX score)  Addressed    Hepatitis A vaccine  Aged Out    Hepatitis B vaccine  Aged Out    Hib vaccine  Aged Out    Meningococcal (ACWY) vaccine  Aged Out       Subjective:      Review of Systems   Constitutional: Negative for chills, fatigue and fever. HENT: Negative for congestion, hearing loss and rhinorrhea. Tenderness over nose   Eyes: Negative for discharge and itching. Respiratory: Negative for cough, shortness of breath and wheezing. Cardiovascular: Negative for chest pain, palpitations and leg swelling. Gastrointestinal: Negative for abdominal distention and abdominal pain. Endocrine: Negative for polydipsia and polyphagia. Genitourinary: Negative for difficulty urinating and dysuria. Musculoskeletal: Negative for arthralgias and back pain. Skin: Negative for rash and wound. Neurological: Negative for dizziness, seizures, light-headedness and headaches. Psychiatric/Behavioral: Negative for agitation and behavioral problems. Objective:     Physical Exam  Constitutional:       Appearance: She is well-developed. HENT:      Head: Normocephalic and atraumatic.    Eyes:      Conjunctiva/sclera: Conjunctivae normal. Pupils: Pupils are equal, round, and reactive to light. Neck:      Thyroid: No thyromegaly. Vascular: No JVD. Cardiovascular:      Rate and Rhythm: Normal rate and regular rhythm. Heart sounds: Normal heart sounds. No murmur heard. Pulmonary:      Effort: Pulmonary effort is normal.      Breath sounds: Normal breath sounds. No wheezing. Abdominal:      General: Bowel sounds are normal. There is no distension. Palpations: Abdomen is soft. Tenderness: There is no abdominal tenderness. There is no rebound. Musculoskeletal:         General: No tenderness. Normal range of motion. Cervical back: Normal range of motion and neck supple. Neurological:      Mental Status: She is alert and oriented to person, place, and time. Cranial Nerves: No cranial nerve deficit. Psychiatric:         Behavior: Behavior normal.       /80   Pulse 80   Wt 245 lb 3.2 oz (111.2 kg)   SpO2 97%   BMI 42.09 kg/m²     Assessment:      Diagnoses and all orders for this visit:  Injury of nose, subsequent encounter  -     External Referral To ENT  Status post fall  -     External Referral To ENT  Acquired hypothyroidism  -     TSH With Reflex Ft4; Future  Primary hypertension-continued on Cozaar, Lopressor and Lasix    Malignant neoplasm of urinary bladder, unspecified site (HCC)-follows up with urology    Dyslipidemia-continued on statin          Plan:      Return in about 6 months (around 7/5/2022). Orders Placed This Encounter   Procedures    TSH With Reflex Ft4     Standing Status:   Future     Standing Expiration Date:   1/5/2023    External Referral To ENT     Referral Priority:   Routine     Referral Type:   Eval and Treat     Referral Reason:   Specialty Services Required     Requested Specialty:   Otolaryngology     Number of Visits Requested:   1     No orders of the defined types were placed in this encounter. Patient given educational materials - see patient instructions. Discussed use, benefit, and side effects of prescribedmedications. All patient questions answered. Pt voiced understanding. Reviewed health maintenance. Instructed to continue current medications, diet and exercise. Patient agreed with treatment plan. Follow up as directed. I spent a total of 20-29 minutes face to face with this patient. Over 50% of that time was spent on counseling and care coordination. Please see assessment and plan for details. Electronically signed by   Lee Desai MD on 1/5/2022 at 9:03 AM  [facility]. Please note that this chart was generated using voice recognition Dragon dictation software. Although every effort was made to ensure the accuracy of this automatedtranscription, some errors in transcription may have occurred.

## 2022-03-21 ENCOUNTER — HOSPITAL ENCOUNTER (OUTPATIENT)
Dept: MAMMOGRAPHY | Age: 77
Discharge: HOME | End: 2022-03-23
Payer: MEDICARE

## 2022-03-21 VITALS — BODY MASS INDEX: 44.16 KG/M2 | HEIGHT: 62 IN | WEIGHT: 240 LBS

## 2022-03-21 DIAGNOSIS — Z12.31 BREAST CANCER SCREENING BY MAMMOGRAM: ICD-10-CM

## 2022-03-21 PROCEDURE — 77063 BREAST TOMOSYNTHESIS BI: CPT

## 2022-03-29 DIAGNOSIS — I10 ESSENTIAL HYPERTENSION: ICD-10-CM

## 2022-03-30 NOTE — TELEPHONE ENCOUNTER
Writer received a fax from [facility] stating patient is now taking Metoprolol 25 MG 4 tablets daily. Writer was unable to find any documentation in regards to these changes. Writer contacted patient. Patient states that after having testing done, Dr Alisa Dlaey advised patient to cut her losartan in half 50 mg daily and to increase the metoprolol to 4 tablets daily. Writer asked if cardiology is managing medication, patient said yes. Writer advised patient to contact Cardiology for medication refill on BP medications since this is their specialty and they are making changes to her dosing. Writer also asked patient to have them send us progress notes to reflect changes they are making. Patient verbalized understanding.  Writer will fax correspondence back to pharmacy

## 2022-05-27 ENCOUNTER — NURSE TRIAGE (OUTPATIENT)
Dept: OTHER | Facility: CLINIC | Age: 77
End: 2022-05-27

## 2022-05-27 ENCOUNTER — TELEPHONE (OUTPATIENT)
Dept: PRIMARY CARE CLINIC | Age: 77
End: 2022-05-27

## 2022-05-27 DIAGNOSIS — J06.9 UPPER RESPIRATORY TRACT INFECTION, UNSPECIFIED TYPE: Primary | ICD-10-CM

## 2022-05-27 RX ORDER — AZITHROMYCIN 250 MG/1
TABLET, FILM COATED ORAL
Qty: 6 TABLET | Refills: 0 | Status: SHIPPED | OUTPATIENT
Start: 2022-05-27 | End: 2022-06-06

## 2022-05-27 NOTE — TELEPHONE ENCOUNTER
Received call from [facility] with [employer]. Subjective: Caller states \"breathing difficulty\"     Current Symptoms: says she just needs a zpak, is the only thing that will dry this up. Chest pain with coughing only , cough is productive-yellow. Has history of asthma she says she was told before, has 3 heart stents, non smoker. Off/on wheezing with the cough she has had for the past week. Breathing issues with coughing started lsst night, currently speaking in complete sentences with writer. Onset: last night    Associated Symptoms: NA    Pain Severity: 0/10; N/A; none only has chest pain with coughing    Temperature: no fever by unknown method    What has been tried: nothing    LMP: NA Pregnant: NA    Recommended disposition: See in Office Today    Care advice provided, patient verbalizes understanding; denies any other questions or concerns; instructed to call back for any new or worsening symptoms. Patient/Caller agrees with recommended disposition; writer provided warm transfer to [facility] for appointment scheduling     Attention Provider: Thank you for allowing me to participate in the care of your patient. The patient was connected to triage in response to information provided to the ECC/PSC. Please do not respond through this encounter as the response is not directed to a shared pool.             Reason for Disposition   MILD difficulty breathing (e.g., minimal/no SOB at rest, SOB with walking, pulse < 100) of new-onset or worse than normal    Protocols used: BREATHING DIFFICULTY-ADULT-[place]

## 2022-05-27 NOTE — TELEPHONE ENCOUNTER
Pt was transferred from [facility] ([facility]) and has previous Nurse Triage encounter. Pt states that last night she developed a cough and she feels her chest filled with phlegm and causing her difficulty breathing at times. Pt denies any chest pain at this time and no difficulty catching her breath at this time. She states that this happens to her when she is traveling and the atmosphere pressure changes. She says that a zpack takes care of it and was wondering if one can be sent in. Please advise.

## 2022-07-06 ENCOUNTER — OFFICE VISIT (OUTPATIENT)
Dept: PRIMARY CARE CLINIC | Age: 77
End: 2022-07-06
Payer: MEDICARE

## 2022-07-06 ENCOUNTER — HOSPITAL ENCOUNTER (OUTPATIENT)
Age: 77
Setting detail: SPECIMEN
Discharge: HOME | End: 2022-07-06

## 2022-07-06 VITALS
OXYGEN SATURATION: 95 % | HEART RATE: 84 BPM | WEIGHT: 248 LBS | DIASTOLIC BLOOD PRESSURE: 82 MMHG | BODY MASS INDEX: 45.36 KG/M2 | SYSTOLIC BLOOD PRESSURE: 136 MMHG

## 2022-07-06 DIAGNOSIS — R53.82 CHRONIC FATIGUE: ICD-10-CM

## 2022-07-06 DIAGNOSIS — E78.9 LIPID DISORDER: ICD-10-CM

## 2022-07-06 DIAGNOSIS — G47.33 OSA (OBSTRUCTIVE SLEEP APNEA): ICD-10-CM

## 2022-07-06 DIAGNOSIS — E55.9 VITAMIN D DEFICIENCY: ICD-10-CM

## 2022-07-06 DIAGNOSIS — E03.8 OTHER SPECIFIED HYPOTHYROIDISM: ICD-10-CM

## 2022-07-06 DIAGNOSIS — E78.5 DYSLIPIDEMIA: ICD-10-CM

## 2022-07-06 DIAGNOSIS — K21.9 GASTROESOPHAGEAL REFLUX DISEASE WITHOUT ESOPHAGITIS: ICD-10-CM

## 2022-07-06 DIAGNOSIS — M51.36 DDD (DEGENERATIVE DISC DISEASE), LUMBAR: ICD-10-CM

## 2022-07-06 DIAGNOSIS — I10 PRIMARY HYPERTENSION: Primary | ICD-10-CM

## 2022-07-06 PROBLEM — S09.92XA INJURY OF NOSE: Status: RESOLVED | Noted: 2022-01-05 | Resolved: 2022-07-06

## 2022-07-06 LAB
CHOLESTEROL/HDL RATIO: 2.3
CHOLESTEROL: 140 MG/DL
FOLATE: 10.8 NG/ML
HDLC SERPL-MCNC: 60 MG/DL
LDL CHOLESTEROL: 61 MG/DL (ref 0–130)
TRIGL SERPL-MCNC: 94 MG/DL
TSH SERPL DL<=0.05 MIU/L-ACNC: 5.93 UIU/ML (ref 0.3–5)
VITAMIN B-12: 474 PG/ML (ref 232–1245)
VITAMIN D 25-HYDROXY: 31.6 NG/ML

## 2022-07-06 PROCEDURE — 1036F TOBACCO NON-USER: CPT | Performed by: STUDENT IN AN ORGANIZED HEALTH CARE EDUCATION/TRAINING PROGRAM

## 2022-07-06 PROCEDURE — G8427 DOCREV CUR MEDS BY ELIG CLIN: HCPCS | Performed by: STUDENT IN AN ORGANIZED HEALTH CARE EDUCATION/TRAINING PROGRAM

## 2022-07-06 PROCEDURE — 99214 OFFICE O/P EST MOD 30 MIN: CPT | Performed by: STUDENT IN AN ORGANIZED HEALTH CARE EDUCATION/TRAINING PROGRAM

## 2022-07-06 PROCEDURE — G8417 CALC BMI ABV UP PARAM F/U: HCPCS | Performed by: STUDENT IN AN ORGANIZED HEALTH CARE EDUCATION/TRAINING PROGRAM

## 2022-07-06 PROCEDURE — 1090F PRES/ABSN URINE INCON ASSESS: CPT | Performed by: STUDENT IN AN ORGANIZED HEALTH CARE EDUCATION/TRAINING PROGRAM

## 2022-07-06 PROCEDURE — G8400 PT W/DXA NO RESULTS DOC: HCPCS | Performed by: STUDENT IN AN ORGANIZED HEALTH CARE EDUCATION/TRAINING PROGRAM

## 2022-07-06 PROCEDURE — 1123F ACP DISCUSS/DSCN MKR DOCD: CPT | Performed by: STUDENT IN AN ORGANIZED HEALTH CARE EDUCATION/TRAINING PROGRAM

## 2022-07-06 ASSESSMENT — ENCOUNTER SYMPTOMS
BACK PAIN: 1
RHINORRHEA: 0
SHORTNESS OF BREATH: 0
WHEEZING: 0
EYE DISCHARGE: 0
ABDOMINAL DISTENTION: 0
EYE ITCHING: 0
COUGH: 0
ABDOMINAL PAIN: 0

## 2022-07-06 NOTE — PROGRESS NOTES
[facility] \[facility]\"" PRIMARY CARE  . [address]   [address]  [address] [address]  Dept: [phone]  Dept Fax: [phone]    Giselle Burroughs is a 76 y.o. female who presents today for her medical conditions/complaints as noted below. Chief Complaint   Patient presents with    Obesity     discuss losing weight       HPI:     76 y. o. F presented to office as a follow-up visit. She denied any current complaints except chronic bilateral knee pain and chronic midline lower back pain. Denied any recent worsening. Patient is motivated to lose weight and mentioned that she is trying to exercise more. No other new current complaints  Other chronic issues including hypertension, hypothyroidism, hyperlipidemia, vitamin D deficiency and bladder cancer have been stable  No other current complaints. Vital signs stable. She declined flu and pneumonia vaccine at this time. Interested in getting shingles vaccine. Advised lifestyle changes. Medications reviewed and refilled as appropriate, problem list updated. All concerns discussed in details and all questions answered to patient satisfaction. Hemoglobin A1C (%)   Date Value   12/14/2020 5.7             ( goal A1C is < 7)   No results found for: LABMICR  LDL Cholesterol (mg/dL)   Date Value   12/14/2020 117     LDL Calculated (mg/dL)   Date Value   01/13/2017 129   10/06/2015 83   08/20/2014 66       (goal LDL is <100)   BUN (mg/dL)   Date Value   11/29/2021 20     BP Readings from Last 3 Encounters:   07/06/22 136/82   01/05/22 124/80   12/17/21 102/62          (goal 120/80)    Past Medical History:   Diagnosis Date    AK (actinic keratosis)     from sun exposure    Anesthesia     HARD TIME WAKING UP \"SOMETIMES\".     Arthritis     Asymptomatic bilateral carotid artery stenosis 3/10/2015    Bladder cancer (HCC)     bladder, first time 44    CAD (coronary artery disease)     Colon polyp     Dr Bozena Vogel Diarrhea     Difficult intubation     SMALL MOUTH AND AIRWAY    Fall     in bath tub 11-24-21, left knee warm and bruised, BRUISED TO CHEEKS AND NOSE, ARMS, KNEES AND LEFT THIGHS. Lawernce Roughen  GERD (gastroesophageal reflux disease)     History of atrial fibrillation     not chronic    Hypertension     Hypothyroid     Obesity     DAISY (obstructive sleep apnea)     NO MACHINE, she has bad sinus issues and requested alternatives instead of a machine, nothing was done    PAC (premature atrial contraction)     occasionally    Prolonged emergence from general anesthesia     Stress incontinence     Wears glasses     FOR READING. Past Surgical History:   Procedure Laterality Date    BACK SURGERY  2019    stem cell injection    BLADDER SURGERY  8/1993 and 10/2002    for bladder cancer treatment    BREAST BIOPSY      LT. BREAST (BENIGN). , large mass removed    CHOLECYSTECTOMY      COLONOSCOPY      3-4X    CORONARY ANGIOPLASTY  07/03/2014    3 stents inserted via heart cath    CYST REMOVAL Right     3RD FINGER.    CYSTOSCOPY  2014    Dr Aida Mcintosh  89-80-01    TURB-bladder tumors removed    CYSTOSCOPY  11/18/2016    fulgeration with biopsies    CYSTOSCOPY N/A 1/21/2020    CYSTO BOTOX INJECTION 100UNITS performed by Lea Rankin MD at [address] [place] [address] 11/17/2020    CYSTOSCOPY INJECTION BOTOX 100 UNITS performed by Lea Rankin MD at [address] [place] [address] 12/10/2021    CYSTOSCOPY BOTOX 100 UNITS performed by Lea Rankin MD at [address] [place] [address]  EYES.    HEMORRHOID SURGERY      HYSTERECTOMY, TOTAL ABDOMINAL (CERVIX REMOVED)  1999    unsure if ovaries remain    KNEE SURGERY Bilateral 07/01/2020    stem cell injections    OTHER SURGICAL HISTORY      abscess on tailbone    TONSILLECTOMY         Family History   Problem Relation Age of Onset   Briggs Cancer Mother         pancreatic age 80    Other Mother         Vascular disease    Cancer Father combo drug, Penstrept was taken off market in 1960s, It contained Penicillin and Streptomycin in one pill. Patient states she has taken penicillin and/or streptomycin individually without problems.  Tobramycin Swelling    Molds & Smuts Other (See Comments)     coughing       Health Maintenance   Topic Date Due    DTaP/Tdap/Td vaccine (1 - Tdap) Never done    Shingles vaccine (1 of 2) Never done    Pneumococcal 65+ years Vaccine (1 - PCV) Never done    Lipids  12/14/2021    Hepatitis C screen  07/06/2023 (Originally 4/7/1963)    Flu vaccine (1) 09/01/2022    Depression Screen  09/28/2022    Annual Wellness Visit (AWV)  09/29/2022    COVID-19 Vaccine  Completed    DEXA (modify frequency per FRAX score)  Addressed    Hepatitis A vaccine  Aged Out    Hepatitis B vaccine  Aged Out    Hib vaccine  Aged Out    Meningococcal (ACWY) vaccine  Aged Out       Subjective:      Review of Systems   Constitutional: Negative for chills, fatigue and fever. HENT: Negative for congestion, hearing loss and rhinorrhea. Tenderness over nose   Eyes: Negative for discharge and itching. Respiratory: Negative for cough, shortness of breath and wheezing. Cardiovascular: Negative for chest pain, palpitations and leg swelling. Gastrointestinal: Negative for abdominal distention and abdominal pain. Endocrine: Negative for polydipsia and polyphagia. Genitourinary: Negative for difficulty urinating and dysuria. Musculoskeletal: Positive for arthralgias and back pain. Bilateral knee pain   Skin: Negative for rash and wound. Neurological: Negative for dizziness, seizures, light-headedness and headaches. Psychiatric/Behavioral: Negative for agitation and behavioral problems. Objective:     Physical Exam  Constitutional:       Appearance: She is well-developed. She is obese. HENT:      Head: Normocephalic and atraumatic.    Eyes:      Conjunctiva/sclera: Conjunctivae normal.      Pupils: Pupils are equal, round, and reactive to light. Neck:      Thyroid: No thyromegaly. Vascular: No JVD. Cardiovascular:      Rate and Rhythm: Normal rate and regular rhythm. Heart sounds: Normal heart sounds. No murmur heard. Pulmonary:      Effort: Pulmonary effort is normal.      Breath sounds: Normal breath sounds. No wheezing. Abdominal:      General: Bowel sounds are normal. There is no distension. Palpations: Abdomen is soft. Tenderness: There is no abdominal tenderness. There is no rebound. Musculoskeletal:         General: No tenderness. Normal range of motion. Cervical back: Normal range of motion and neck supple. Neurological:      Mental Status: She is alert and oriented to person, place, and time. Cranial Nerves: No cranial nerve deficit. Psychiatric:         Behavior: Behavior normal.       /82   Pulse 84   Wt 248 lb (112.5 kg)   SpO2 95%   BMI 45.36 kg/m²     Assessment:      Diagnoses and all orders for this visit:    Diagnoses and all orders for this visit:  Primary hypertension-stable  Chronic fatigue  -     Vitamin B12 & Folate; Future  -     TSH with Reflex; Future  -     Hepatic Function Panel  Body mass index (BMI) 45.0-49.9, adult (HCC) lifestyle changes  Vitamin D deficiency  -     Vitamin D 25 Hydroxy; Future  Lipid disorder  -     Lipid Panel; Future  Other specified hypothyroidism- continue on Synthroid  Dyslipidemia -continued on Crestor  DDD (degenerative disc disease), lumbar-stable  Gastroesophageal reflux disease without esophagitis-on Protonix  DAISY (obstructive sleep apnea)-stable          Plan:      Return in about 6 months (around 1/6/2023).     Orders Placed This Encounter   Procedures    Lipid Panel     Standing Status:   Future     Standing Expiration Date:   7/6/2023     Order Specific Question:   Is Patient Fasting?/# of Hours     Answer:   8-10 hrs    Vitamin D 25 Hydroxy     Standing Status:   Future     Standing Expiration Date:   1/6/2024    Vitamin B12 & Folate     Standing Status:   Future     Standing Expiration Date:   7/6/2023    TSH with Reflex     Standing Status:   Future     Standing Expiration Date:   7/6/2023    Hepatic Function Panel     No orders of the defined types were placed in this encounter. Patient given educational materials - see patient instructions. Discussed use, benefit, and side effects of prescribedmedications. All patient questions answered. Pt voiced understanding. Reviewed health maintenance. Instructed to continue current medications, diet and exercise. Patient agreed with treatment plan. Follow up as directed. I spent a total of 30-39 minutes face to face with this patient. Over 50% of that time was spent on counseling and care coordination. Please see assessment and plan for details. Electronically signed by   Nicky Douglas MD on 7/6/2022 at 8:42 AM  [facility]. Please note that this chart was generated using voice recognition Dragon dictation software. Although every effort was made to ensure the accuracy of this automatedtranscription, some errors in transcription may have occurred.

## 2022-07-07 LAB — THYROXINE, FREE: 1.19 NG/DL (ref 0.93–1.7)

## 2022-10-04 ENCOUNTER — TELEPHONE (OUTPATIENT)
Dept: UROLOGY | Age: 77
End: 2022-10-04

## 2022-10-04 ENCOUNTER — OFFICE VISIT (OUTPATIENT)
Dept: UROLOGY | Age: 77
End: 2022-10-04
Payer: MEDICARE

## 2022-10-04 VITALS
HEIGHT: 62 IN | BODY MASS INDEX: 45.64 KG/M2 | TEMPERATURE: 98 F | SYSTOLIC BLOOD PRESSURE: 126 MMHG | WEIGHT: 248 LBS | DIASTOLIC BLOOD PRESSURE: 78 MMHG

## 2022-10-04 DIAGNOSIS — R39.15 URGENCY OF URINATION: Primary | ICD-10-CM

## 2022-10-04 DIAGNOSIS — Z85.51 HISTORY OF BLADDER CANCER: ICD-10-CM

## 2022-10-04 PROCEDURE — G8484 FLU IMMUNIZE NO ADMIN: HCPCS | Performed by: UROLOGY

## 2022-10-04 PROCEDURE — 1036F TOBACCO NON-USER: CPT | Performed by: UROLOGY

## 2022-10-04 PROCEDURE — G8427 DOCREV CUR MEDS BY ELIG CLIN: HCPCS | Performed by: UROLOGY

## 2022-10-04 PROCEDURE — 1123F ACP DISCUSS/DSCN MKR DOCD: CPT | Performed by: UROLOGY

## 2022-10-04 PROCEDURE — 99214 OFFICE O/P EST MOD 30 MIN: CPT | Performed by: UROLOGY

## 2022-10-04 PROCEDURE — G8417 CALC BMI ABV UP PARAM F/U: HCPCS | Performed by: UROLOGY

## 2022-10-04 PROCEDURE — 1090F PRES/ABSN URINE INCON ASSESS: CPT | Performed by: UROLOGY

## 2022-10-04 PROCEDURE — G8400 PT W/DXA NO RESULTS DOC: HCPCS | Performed by: UROLOGY

## 2022-10-04 ASSESSMENT — ENCOUNTER SYMPTOMS
BACK PAIN: 0
VOMITING: 0
NAUSEA: 0
COUGH: 0
CONSTIPATION: 0
ABDOMINAL PAIN: 0
WHEEZING: 0
SHORTNESS OF BREATH: 0
EYE PAIN: 0
EYE REDNESS: 0

## 2022-10-04 NOTE — LETTER
[address] [address]  Dept: [phone]  Dept Fax: [phone]        10/4/22    Patient: Radha Pierre  YOB: 1945    Dear Alisa Dotson MD,    I had the pleasure of seeing one of your patients, Reid Gilman today in the office today. Below are the relevant portions of my assessment and plan of care. IMPRESSION:  1. Urgency of urination    2. History of bladder cancer        PLAN:  She is doing well. Botox has faded. 100U did not do as well. Needs cysto for bladder cancer surveillance. Will arrange for botox 150Units. Return for surgery. Prescriptions Ordered:  No orders of the defined types were placed in this encounter. Orders Placed:  No orders of the defined types were placed in this encounter. Thank you for allowing me to participate in the care of this patient. I will keep you updated on this patient's follow up and I look forward to serving you and your patients again in the future.         Regina Mora MD

## 2022-10-04 NOTE — TELEPHONE ENCOUNTER
Cysto, botox 150units @ [facility] 11/11/22 8:00am **STOP BLOOD THINNERS 11/09/22**   Pat @ [facility] 10/28/22 10:30am          Spoke with patient in office, procedure info given to patient.

## 2022-10-04 NOTE — PROGRESS NOTES
[address] [address]  Dept: [address] [facility] Urology Office Note - Established    Patient:  Bassem Hardy  YOB: 1945  Date: 10/4/2022    The patient is a 76 y.o. female whopresents today for evaluation of the following problems:   Chief Complaint   Patient presents with    Check-Up     Yearly urgency check , discuss Botox        HPI  She is here in follow up for OAB. She had a botox in December of 2021. She had a benefit for about 6 months. She is up 1-2 times per night. She does restrict her fluids before bed. She was wearing 2-3 pads per day, and is now 4-5. Her leaking is related to OAB. No recent hematuria or dysuria. Summary of old records: N/A    Additional History: N/A    Procedures Today: N/A    Urinalysis today:  No results found for this visit on 10/04/22. Imaging Reviewed during this Office Visit: none  (results were independently reviewed by physician and radiology report verified)    AUA Symptom Score (10/4/2022): Last BUN and creatinine:  Lab Results   Component Value Date    BUN 20 11/29/2021     Lab Results   Component Value Date    CREATININE 0.80 11/29/2021       Additional Lab/Culture results: none    PAST MEDICAL, FAMILY AND SOCIAL HISTORY UPDATE:  Past Medical History:   Diagnosis Date    AK (actinic keratosis)     from sun exposure    Anesthesia     HARD TIME WAKING UP \"SOMETIMES\". Arthritis     Asymptomatic bilateral carotid artery stenosis 3/10/2015    Bladder cancer ([facility] [address]     bladder, first time 1993    CAD (coronary artery disease)     Colon polyp     Dr Win Kwadwo    Diarrhea     Difficult intubation     SMALL MOUTH AND AIRWAY    Fall     in bath tub 11-24-21, left knee warm and bruised, BRUISED TO CHEEKS AND NOSE, ARMS, KNEES AND LEFT THIGHS. Harden Beech     GERD (gastroesophageal reflux disease)     History of atrial fibrillation     not chronic    Hypertension     Hypothyroid     Obesity     DAISY (obstructive sleep apnea)     NO MACHINE, she has bad sinus issues and requested alternatives instead of a machine, nothing was done    PAC (premature atrial contraction)     occasionally    Prolonged emergence from general anesthesia     Stress incontinence     Wears glasses     FOR READING. Past Surgical History:   Procedure Laterality Date    BACK SURGERY  2019    stem cell injection    BLADDER SURGERY  8/1993 and 10/2002    for bladder cancer treatment    BREAST BIOPSY      LT. BREAST (BENIGN). , large mass removed    CHOLECYSTECTOMY      COLONOSCOPY      3-4X    CORONARY ANGIOPLASTY  07/03/2014    3 stents inserted via heart cath    CYST REMOVAL Right     3RD FINGER. CYSTOSCOPY  2014    Dr Yesika Meza  11-17-15    TURB-bladder tumors removed    CYSTOSCOPY  11/18/2016    fulgeration with biopsies    CYSTOSCOPY N/A 1/21/2020    CYSTO BOTOX INJECTION 100UNITS performed by Dorothy Mcnally MD at [address] N/A 11/17/2020    CYSTOSCOPY INJECTION BOTOX 100 UNITS performed by Dorothy Mcnally MD at [address] N/A 12/10/2021    CYSTOSCOPY BOTOX 100 UNITS performed by Dorothy Mcnally MD at [address] [facility] [place]  EYES.    HEMORRHOID SURGERY      HYSTERECTOMY, TOTAL ABDOMINAL (CERVIX REMOVED)  1999    unsure if ovaries remain    KNEE SURGERY Bilateral 07/01/2020    stem cell injections    OTHER SURGICAL HISTORY      abscess on tailbone    TONSILLECTOMY       Family History   Problem Relation Age of Onset    Cancer Mother         pancreatic age 80    Other Mother         Vascular disease    Cancer Father         bladder age 80    Heart Disease Maternal Grandmother     Heart Disease Maternal Grandfather     Heart Disease Paternal Grandfather     Cancer Paternal Aunt         ovarian     Outpatient Medications Marked as Taking for the 10/4/22 encounter (Office Visit) with [facility] MD Andrew   Medication Sig Dispense Refill    metoprolol tartrate (LOPRESSOR) 25 MG tablet TAKE ONE TABLET BY MOUTH TWICE A DAY (Patient taking differently: 50 mg TAKE ONE TABLET BY MOUTH TWICE A DAY) 180 tablet 2    levothyroxine (SYNTHROID) 75 MCG tablet TAKE ONE TABLET BY MOUTH DAILY 90 tablet 3    Skin Protectants, Misc. (EUCERIN) cream Apply topically as needed. 1 g 0    vitamin D (ERGOCALCIFEROL) 1.25 MG (94732 UT) CAPS capsule Take 1 capsule by mouth once a week 4 capsule 5    rosuvastatin (CRESTOR) 20 MG tablet       losartan (COZAAR) 100 MG tablet TAKE ONE TABLET BY MOUTH DAILY 90 tablet 0    furosemide (LASIX) 20 MG tablet TAKE ONE TABLET BY MOUTH DAILY AS NEEDED FOR PEDAL EDEMA 60 tablet 0    pantoprazole (PROTONIX) 40 MG tablet Take 40 mg by mouth daily       clotrimazole-betamethasone (LOTRISONE) 1-0.05 % cream Apply externally to affected area twice daily prn 45 g 1    aspirin 81 MG EC tablet Take 81 mg by mouth daily      nitroGLYCERIN (NITROSTAT) 0.4 MG SL tablet Place 0.4 mg under the tongue every 5 minutes as needed for Chest pain. Multiple Vitamins-Minerals (THERAPEUTIC MULTIVITAMIN-MINERALS) tablet Take 1 tablet by mouth daily. (All medications reviewed and updated by provider sincelast office visit or hospitalization)   Atorvastatin, Other, Tobramycin, and Molds & smuts  Social History     Tobacco Use   Smoking Status Former    Packs/day: 1.50    Years: 30.00    Pack years: 45.00    Types: Cigarettes    Quit date: 1992    Years since quittin.7   Smokeless Tobacco Never      (If patient a smoker, smoking cessation counseling offered)     Social History     Substance and Sexual Activity   Alcohol Use Yes    Comment: COUPLE DRINKS PER WEEK. REVIEW OF SYSTEMS:  Review of Systems      Physical Exam:      Vitals:    10/04/22 0909   BP: 126/78   Temp: 98 °F (36.7 °C)     Body mass index is 45.36 kg/m².   Patient is a 76 y.o. female in noacute distress and alert and oriented to person, place and time. Physical Exam  Constitutional: Patient in no acute distress. Neuro: Alert andoriented to person, place and time. Psych: Mood normal, affect normal  Skin: No rash noted  HEENT: Head: Normocephalic and atraumatic  Conjunctivae and EOM are normal. Pupils are equal, round  Nose: Normal  Right External Ear: Normal; Left External Ear: Normal  Mouth: Mucosa Moist  Neck: Supple      and Plan      1. Urgency of urination    2. History of bladder cancer           Plan:        She is doing well. Botox has faded. 100U did not do as well. Needs cysto for bladder cancer surveillance. Will arrange for botox 150Units. Return for surgery. Prescriptions Ordered:  No orders of the defined types were placed in this encounter. Orders Placed:  No orders of the defined types were placed in this encounter. Amado Benoit MD    Agree with the ROS entered by the MA.

## 2022-10-27 PROBLEM — Z01.818 PREOP EXAMINATION: Status: ACTIVE | Noted: 2022-10-27

## 2022-10-27 NOTE — H&P (VIEW-ONLY)
HISTORY and Treraul Fournier [id number]       NAME:  Elicia Ordonez  MRN: [id number]   YOB: 1945   Date: 10/28/2022   Age: 76 y.o. Gender: female     COMPLAINT AND PRESENT HISTORY:   Elicia Ordonez is 76 y.o.,  female, presents for pre-anesthesia/admission testing for CYSTOSCOPY INJECTION 150 UNITS BOTOX per Dr. Lona Zuleta. Primary dx: URGENCY OF URINATION. HPI:  SEE PORTION OF NOTE BELOW PER DR. Lona Zuleta, 10-4-22 (REVIEWED):  The patient is a 76 y.o. female whopresents today for evaluation of the following problems:        Chief Complaint   Patient presents with    Check-Up       Yearly urgency check , discuss Botox       HPI  She is here in follow up for OAB. She had a botox in December of 2021. She had a benefit for about 6 months. She is up 1-2 times per night. She does restrict her fluids before bed. She was wearing 2-3 pads per day, and is now 4-5. Her leaking is related to OAB. No recent hematuria or dysuria. and Plan   1. Urgency of urination    2. History of bladder cancer          Plan:      She is doing well. Botox has faded. 100U did not do as well. Needs cysto for bladder cancer surveillance. Will arrange for botox 150Units. Return for surgery. Update: Patient states hx of bladder CA- dx'd at 2 different times. Bladder CA was tx w/surgery, denies chemo/radiation. Review of additional significant medical hx:  B/L CAROTID ARTERY STENOSIS, CAD, A-FIB, HTN, PAC: Patient does follow w/cardiology- Dr. Aubree Garcia- states UTD w/appointment, follows once/every 6 months. Last seen in July 2022. States she did obtain cardiac clearance. Patient states she very seldom has issue w/a-fib. She did wear a Holter monitor, was told she had an issue w/upper chambers of heart- metoprolol was \"doubled\". Denies current/recent chest pain, palpitations (\"not really\"), +SOBOE, denies dizziness, + leg swelling (improved), denies frequent headache. Carotid artery stenosis is being monitored w/routine imaging. States hx of x3 cardiac stents she believes in 2014. States normally BP is improved when taking manually, she does take her BP medication consistently. Current medications r/t condition: METOPROLOL, CRESTOR, LOSARTAN, LASIX, NITRO PRN, ASA    BP Readings from Last 3 Encounters:   10/28/22 (!) 157/85   10/04/22 126/78   07/06/22 136/82      STRESS TEST (5/2021) REVIEWED PER CHART. ECHO, 5-21-21:  Echo complete W/O contrast    Anatomical Region Laterality Modality   Chest -- Ultrasound     Narrative    Normal left ventricular size and systolic function with mild concentric   left ventricular hypertrophy. Aortic sclerosis without aortic stenosis. Mild mitral regurgitation. VAC CAROTID DUPLEX B/L, 1-18-22:  Conclusions: BILATERAL: 50-69% stenosis of the internal carotid artery. Image and spectral Doppler data consistent with hemodynamically significant (>50%) external carotid artery stenosis. Antegrade vertebral artery flow. Recommendations: Any questions prior to finalization, please call the reading physician during normal business hours at the phone number beside their name. When compared to previous  report significant changes were noted. Exam End: 01/18/22 12:07    Specimen Collected: 01/18/22 12:02 Last Resulted: 01/20/22 10:41   Received From: [facility]  Result Received: 03/21/22 12:54     Narrative, EVENT MONITOR, 12-27-21    This is a 30 day cardiac event monitor during which patient remained in   sinus rhythm with episodes of PACs, nonsustained SVT consistent with   atrial tachycardia. Patient reported multiple diary entries with skipped   beats or heart racing which were mostly sinus rhythm or sinus tachycardia   rare PACs, nonsustained SVT most likely atrial tachycardia.      One strip that has been labeled as atrial fibrillation or atrial   tachycardia however not entirely convincing for AFib due to some regularity and P-waves noted in this artifactual strip. Clinical   correlation advised. Exam End: 12/27/21 14:25 Last Resulted: 12/29/21 22:19   Received From: [address] [place] [address] [facility]  Result Received: 03/21/22 12:54     HYPOTHYROIDISM:  Current medications r/t condition: LEVOTHYROXINE  Lab Results   Component Value Date    TSH 5.93 (H) 07/06/2022     DAISY: Patient does not use a machine for this. Activity level: Patient is going to start chair piliates, does not do routine exercise currently. Functional Capacity per patient:              1. Patient IS NOT SURE able to walk 2 city blocks on level ground without SOB. 2. Patient IS NOT SURE able to climb 2 flights of stairs without SOB. Denies hx of MRSA infection. Denies hx of blood clots. Denies hx of any personal or family hx of complications w/anesthesia EXCEPT hard time waking up \"sometimes\"/PROLONGED EMERGENCE FROM ANESTHESIA. DIFFICULT INTUBATION (SMALL MOUTH AND AIRWAY). Denies any personal/family hx of malignant hyperthermia. PAST MEDICAL HISTORY     Past Medical History:   Diagnosis Date    AK (actinic keratosis)     from sun exposure    Anesthesia     HARD TIME WAKING UP \"SOMETIMES\". Arthritis     Asymptomatic bilateral carotid artery stenosis 03/10/2015    Atherosclerosis of native coronary artery of native heart without angina pectoris 5/23/2017    Bladder cancer ([facility] [address]     bladder, first time 1993    CAD (coronary artery disease)     x3 stents    Colon polyp     Dr. Jay Reveal- Jm [place] crowns present     Diarrhea     Difficult intravenous access     Difficult intubation     SMALL MOUTH AND AIRWAY    Fall     in bath tub 11-24-21, left knee warm and bruised, BRUISED TO CHEEKS AND NOSE, ARMS, KNEES AND LEFT THIGHS. Babatunde Oliver     GERD (gastroesophageal reflux disease)     Head injury 11/24/2021    Per [facility] chart- \"closed head injury, fell in the bath tub\"    History of atrial fibrillation     \"Very seldom\" per patient    Hypertension Hypothyroid     Obesity     DAISY (obstructive sleep apnea)     NO MACHINE, she has bad sinus issues and requested alternatives instead of a machine, nothing was done    PAC (premature atrial contraction)     occasionally    Prolonged emergence from general anesthesia     Stress incontinence     Syncope     Per [facility] chart    Wears glasses     FOR READING. SURGICAL HISTORY       Past Surgical History:   Procedure Laterality Date    BLADDER SURGERY  8/1993 and 10/2002    For bladder cancer treatment (patient states through cystoscopies)    BREAST BIOPSY      LT. BREAST (BENIGN). , large mass removed    CHOLECYSTECTOMY      COLONOSCOPY      3-4X    COLONOSCOPY  07/19/2019    Procedure: COLONOSCOPY AND POLYPECTOMY; Surgeon: Pastor Aleks MD; Location: [facility]; Service: Gastroenterology; Laterality: N/A;    CORONARY ANGIOPLASTY  07/03/2014    3 stents inserted via heart cath    CYST REMOVAL Right     3RD FINGER. CYSTOSCOPY  2014    Dr Oscar Lucas  11/17/2015    TURB-bladder tumors removed    CYSTOSCOPY  11/18/2016    fulgeration with biopsies    CYSTOSCOPY N/A 01/21/2020    CYSTO BOTOX INJECTION 100UNITS performed by Arun Hayden MD at [address] N/A 11/17/2020    CYSTOSCOPY INJECTION BOTOX 100 UNITS performed by Arun Hayden MD at [address] N/A 12/10/2021    CYSTOSCOPY BOTOX 100 UNITS performed by Arun Hayden MD at [id number]    ESOPHAGOGASTRODUODENOSCOPY  07/19/2019    Procedure: EGD with BX; Surgeon: Pastor Aleks MD; Location: [facility]; Service: Gastroenterology; Laterality: N/A;    ESOPHAGOGASTRODUODENOSCOPY  08/08/2022    Procedure: ESOPHAGOGASTRODUODENOSCOPY; Surgeon: Brett Jeffries MD; Location: [facility]; Service: Gastroenterology; Laterality: N/A;    EYE SURGERY      LASIK MACIEJ.  EYES.    HEMORRHOID SURGERY      HYSTERECTOMY, TOTAL ABDOMINAL (CERVIX REMOVED)  1999    unsure if ovaries remain    OTHER SURGICAL HISTORY abscess on tailbone removed    OTHER SURGICAL HISTORY Bilateral 2020    Stem cell injections    OTHER SURGICAL HISTORY  2019    Stem cell injection to lower back- not a surgery- just injection    TONSILLECTOMY         SOCIAL HISTORY       Social History     Socioeconomic History    Marital status:      Spouse name: None    Number of children: None    Years of education: None    Highest education level: None   Tobacco Use    Smoking status: Former     Packs/day: 1.50     Years: 30.00     Pack years: 45.00     Types: Cigarettes     Quit date: 1992     Years since quittin.8    Smokeless tobacco: Never   Vaping Use    Vaping Use: Never used   Substance and Sexual Activity    Alcohol use: Yes     Comment: COUPLE DRINKS PER WEEK. Drug use: No       REVIEW OF SYSTEMS      Allergies   Allergen Reactions    Atorvastatin Other (See Comments)     diarrhea    Other Other (See Comments)     Passed out and because of multiple issues with this combo drug, Penstrept was taken off market in 1960s, It contained Penicillin and Streptomycin in one pill. Patient states she has taken penicillin and/or streptomycin individually without problems. Tobramycin Swelling    Molds & Smuts Other (See Comments)     coughing       Current Outpatient Medications on File Prior to Encounter   Medication Sig Dispense Refill    metoprolol tartrate (LOPRESSOR) 25 MG tablet TAKE ONE TABLET BY MOUTH TWICE A DAY (Patient taking differently: 50 mg TAKE ONE TABLET BY MOUTH TWICE A DAY) 180 tablet 2    levothyroxine (SYNTHROID) 75 MCG tablet TAKE ONE TABLET BY MOUTH DAILY 90 tablet 3    Skin Protectants, Misc. (EUCERIN) cream Apply topically as needed.  1 g 0    vitamin D (ERGOCALCIFEROL) 1.25 MG (14801 UT) CAPS capsule Take 1 capsule by mouth once a week 4 capsule 5    rosuvastatin (CRESTOR) 20 MG tablet       losartan (COZAAR) 100 MG tablet TAKE ONE TABLET BY MOUTH DAILY 90 tablet 0    furosemide (LASIX) 20 MG tablet TAKE ONE TABLET BY MOUTH DAILY AS NEEDED FOR PEDAL EDEMA 60 tablet 0    pantoprazole (PROTONIX) 40 MG tablet Take 40 mg by mouth daily       clotrimazole-betamethasone (LOTRISONE) 1-0.05 % cream Apply externally to affected area twice daily prn 45 g 1    aspirin 81 MG EC tablet Take 81 mg by mouth daily      nitroGLYCERIN (NITROSTAT) 0.4 MG SL tablet Place 0.4 mg under the tongue every 5 minutes as needed for Chest pain. Multiple Vitamins-Minerals (THERAPEUTIC MULTIVITAMIN-MINERALS) tablet Take 1 tablet by mouth daily. [DISCONTINUED] solifenacin (VESICARE) 5 MG tablet Take 5 mg by mouth daily. No current facility-administered medications on file prior to encounter. Review of Systems   Constitutional:  Negative for chills and fever. HENT:  Negative for congestion, ear pain, rhinorrhea, sore throat and trouble swallowing. Respiratory:  Positive for apnea (Hx of sleep apnea) and shortness of breath (+ SOBOE). Negative for cough and wheezing. Cardiovascular:  Positive for leg swelling. Negative for chest pain and palpitations. Gastrointestinal:  Negative for abdominal pain, anal bleeding, blood in stool, constipation, diarrhea, nausea and vomiting. Genitourinary:  Positive for difficulty urinating (At times), frequency (Depends on liquid intake) and urgency. Negative for dysuria, flank pain and hematuria. See HPI. Musculoskeletal:  Positive for arthralgias (TKR planned for 11-14-22). Skin:  Negative for rash and wound. Neurological:  Negative for dizziness and headaches. Hematological:  Bruises/bleeds easily (Bruise easily). GENERAL PHYSICAL EXAM     Vitals: BP (!) 157/85   Pulse 81   Temp 97.4 °F (36.3 °C) (Infrared)   Resp 18   Ht 5' 3\" (1.6 m)   Wt 250 lb (113.4 kg)   SpO2 98%   BMI 44.29 kg/m²               Physical Exam  Vitals reviewed. Constitutional:       General: She is not in acute distress. Appearance: She is well-developed. She is obese.  She is not ill-appearing, toxic-appearing or diaphoretic. HENT:      Head: Normocephalic. Right Ear: External ear normal.      Left Ear: External ear normal.      Nose: Nose normal.      Mouth/Throat:      Dentition: Abnormal dentition (+ dental crowns). Does not have dentures. Pharynx: No oropharyngeal exudate or posterior oropharyngeal erythema. Tonsils: No tonsillar abscesses. Eyes:      General:         Right eye: No discharge. Left eye: No discharge. Conjunctiva/sclera: Conjunctivae normal.      Pupils: Pupils are equal, round, and reactive to light. Neck:      Vascular: No carotid bruit. Cardiovascular:      Rate and Rhythm: Normal rate and regular rhythm. Pulses: Intact distal pulses. Heart sounds: Normal heart sounds. Pulmonary:      Effort: Pulmonary effort is normal. No accessory muscle usage or respiratory distress. Breath sounds: Normal breath sounds. No decreased breath sounds, wheezing, rhonchi or rales. Abdominal:      General: Bowel sounds are normal. There is no distension. Palpations: Abdomen is soft. There is no mass. Tenderness: There is no abdominal tenderness. There is no right CVA tenderness, left CVA tenderness, guarding or rebound. Musculoskeletal:      Right lower leg: No tenderness. 2+ Pitting Edema (No erythema, no warmth) present. Left lower leg: No tenderness. 2+ Pitting Edema (No erythema, no warmth) present. Comments: Negative Bettye's sign b/l (performed in sitting position). Lymphadenopathy:      Cervical: No cervical adenopathy. Skin:     General: Skin is warm and dry. Neurological:      Mental Status: She is alert and oriented to person, place, and time. Gait: Gait abnormal (Antalgic).    Psychiatric:         Behavior: Behavior normal.       LAB REVIEW     Lab Results   Component Value Date     10/28/2022    K 4.6 10/28/2022     10/28/2022    CO2 25 10/28/2022    BUN 17 10/28/2022    CREATININE 0.79 10/28/2022    GLUCOSE 102 (H) 10/28/2022    CALCIUM 8.7 10/28/2022    LABGLOM >60 10/28/2022    GFRAA >60 11/29/2021     Lab Results   Component Value Date    WBC 7.1 10/28/2022    HGB 12.6 10/28/2022    HCT 39.4 10/28/2022    MCV 90.1 10/28/2022     10/28/2022     EKG REVIEW, DATE: 5-16-22 (per [address] [place] [address]- see surgery chart)     SINUS RHYTHM  65 BPM    SURGERY / PROVISIONAL DIAGNOSES:      CYSTOSCOPY INJECTION 150 UNITS BOTOX    URGENCY OF URINATION    Patient Active Problem List    Diagnosis Date Noted    Preop examination 10/27/2022    Primary osteoarthritis of both knees 10/18/2022    Status post fall 01/05/2022    Primary osteoarthritis of right knee 05/17/2020    Actinic keratosis 02/12/2020    Polyp of colon 02/12/2020    Gastroesophageal reflux disease 02/12/2020    Helton's esophagus without dysplasia 10/22/2019    Left ventricular hypertrophy 02/21/2019    Dyslipidemia 05/07/2018    Atherosclerosis of native coronary artery of native heart without angina pectoris 05/23/2017    Asymptomatic bilateral carotid artery stenosis 03/10/2015    Bladder cancer ([facility] [address] 08/25/2014    Malignant tumor of urinary bladder ([facility] [address] 08/25/2014    DDD (degenerative disc disease), lumbar 03/04/2014    DAISY (obstructive sleep apnea)     Hypothyroid     Irritable bowel syndrome     Obesity     Hypertension          CLEARANCE: Medical and cardiac clearance noted per chart. Based on my personal evaluation of patient including review of patient's chart, no additional clearance required for scheduled surgery aside from medical and cardiac hx, which has already been obtained.     Total time spent on encounter- PAT provider minutes: 31-40 minutes     HARESH Camarena CNP on 10/28/2022 at 3:48 PM

## 2022-10-27 NOTE — H&P
HISTORY and [id number]       NAME:  Narcisa Brooks  MRN: [id number]   YOB: 1945   Date: 10/28/2022   Age: 76 y.o. Gender: female     COMPLAINT AND PRESENT HISTORY:   Narcisa Brooks is 76 y.o.,  female, presents for pre-anesthesia/admission testing for CYSTOSCOPY INJECTION 150 UNITS BOTOX per Dr. Jade Beck. Primary dx: URGENCY OF URINATION. HPI:  SEE PORTION OF NOTE BELOW PER DR. Jade Beck, 10-4-22 (REVIEWED):  The patient is a 76 y.o. female whopresents today for evaluation of the following problems:        Chief Complaint   Patient presents with    Check-Up       Yearly urgency check , discuss Botox       HPI  She is here in follow up for OAB. She had a botox in December of 2021. She had a benefit for about 6 months. She is up 1-2 times per night. She does restrict her fluids before bed. She was wearing 2-3 pads per day, and is now 4-5. Her leaking is related to OAB. No recent hematuria or dysuria. and Plan   1. Urgency of urination    2. History of bladder cancer          Plan:      She is doing well. Botox has faded. 100U did not do as well. Needs cysto for bladder cancer surveillance. Will arrange for botox 150Units. Return for surgery. Update: Patient states hx of bladder CA- dx'd at 2 different times. Bladder CA was tx w/surgery, denies chemo/radiation. Review of additional significant medical hx:  B/L CAROTID ARTERY STENOSIS, CAD, A-FIB, HTN, PAC: Patient does follow w/cardiology- Dr. Yemi Enciso- states UTD w/appointment, follows once/every 6 months. Last seen in July 2022. States she did obtain cardiac clearance. Patient states she very seldom has issue w/a-fib. She did wear a Holter monitor, was told she had an issue w/upper chambers of heart- metoprolol was \"doubled\". Denies current/recent chest pain, palpitations (\"not really\"), +SOBOE, denies dizziness, + leg swelling (improved), denies frequent headache. Carotid artery stenosis is being monitored w/routine imaging. States hx of x3 cardiac stents she believes in 2014. States normally BP is improved when taking manually, she does take her BP medication consistently. Current medications r/t condition: METOPROLOL, CRESTOR, LOSARTAN, LASIX, NITRO PRN, ASA    BP Readings from Last 3 Encounters:   10/28/22 (!) 157/85   10/04/22 126/78   07/06/22 136/82      STRESS TEST (5/2021) REVIEWED PER CHART. ECHO, 5-21-21:  Echo complete W/O contrast    Anatomical Region Laterality Modality   Chest -- Ultrasound     Narrative    Normal left ventricular size and systolic function with mild concentric   left ventricular hypertrophy. Aortic sclerosis without aortic stenosis. Mild mitral regurgitation. VAC CAROTID DUPLEX B/L, 1-18-22:  Conclusions: BILATERAL: 50-69% stenosis of the internal carotid artery. Image and spectral Doppler data consistent with hemodynamically significant (>50%) external carotid artery stenosis. Antegrade vertebral artery flow. Recommendations: Any questions prior to finalization, please call the reading physician during normal business hours at the phone number beside their name. When compared to previous  report significant changes were noted. Exam End: 01/18/22 12:07    Specimen Collected: 01/18/22 12:02 Last Resulted: 01/20/22 10:41   Received From: [email or web address]  Result Received: 03/21/22 12:54     Narrative, EVENT MONITOR, 12-27-21    This is a 30 day cardiac event monitor during which patient remained in   sinus rhythm with episodes of PACs, nonsustained SVT consistent with   atrial tachycardia. Patient reported multiple diary entries with skipped   beats or heart racing which were mostly sinus rhythm or sinus tachycardia   rare PACs, nonsustained SVT most likely atrial tachycardia.      One strip that has been labeled as atrial fibrillation or atrial   tachycardia however not entirely convincing for AFib due to some regularity and P-waves noted in this artifactual strip. Clinical   correlation advised. Exam End: 12/27/21 14:25 Last Resulted: 12/29/21 22:19   Received From: [address] [place] [address] [employer]  Result Received: 03/21/22 12:54     HYPOTHYROIDISM:  Current medications r/t condition: LEVOTHYROXINE  Lab Results   Component Value Date    TSH 5.93 (H) 07/06/2022     DAISY: Patient does not use a machine for this. Activity level: Patient is going to start chair piliates, does not do routine exercise currently. Functional Capacity per patient:              1. Patient IS NOT SURE able to walk 2 city blocks on level ground without SOB. 2. Patient IS NOT SURE able to climb 2 flights of stairs without SOB. Denies hx of MRSA infection. Denies hx of blood clots. Denies hx of any personal or family hx of complications w/anesthesia EXCEPT hard time waking up \"sometimes\"/PROLONGED EMERGENCE FROM ANESTHESIA. DIFFICULT INTUBATION (SMALL MOUTH AND AIRWAY). Denies any personal/family hx of malignant hyperthermia. PAST MEDICAL HISTORY     Past Medical History:   Diagnosis Date    AK (actinic keratosis)     from sun exposure    Anesthesia     HARD TIME WAKING UP \"SOMETIMES\". Arthritis     Asymptomatic bilateral carotid artery stenosis 03/10/2015    Atherosclerosis of native coronary artery of native heart without angina pectoris 5/23/2017    Bladder cancer ([facility] [address]     bladder, first time 1993    CAD (coronary artery disease)     x3 stents    Colon polyp     Dr. Antolin leal present     Diarrhea     Difficult intravenous access     Difficult intubation     SMALL MOUTH AND AIRWAY    Fall     in bath tub 11-24-21, left knee warm and bruised, BRUISED TO CHEEKS AND NOSE, ARMS, KNEES AND LEFT THIGHS. Tyler Hudson     GERD (gastroesophageal reflux disease)     Head injury 11/24/2021    Per [facility] chart- \"closed head injury, fell in the bath tub\"    History of atrial fibrillation     \"Very seldom\" per patient    Hypertension abscess on tailbone removed    OTHER SURGICAL HISTORY Bilateral 2020    Stem cell injections    OTHER SURGICAL HISTORY  2019    Stem cell injection to lower back- not a surgery- just injection    TONSILLECTOMY         SOCIAL HISTORY       Social History     Socioeconomic History    Marital status:      Spouse name: None    Number of children: None    Years of education: None    Highest education level: None   Tobacco Use    Smoking status: Former     Packs/day: 1.50     Years: 30.00     Pack years: 45.00     Types: Cigarettes     Quit date: 1992     Years since quittin.8    Smokeless tobacco: Never   Vaping Use    Vaping Use: Never used   Substance and Sexual Activity    Alcohol use: Yes     Comment: COUPLE DRINKS PER WEEK. Drug use: No       REVIEW OF SYSTEMS      Allergies   Allergen Reactions    Atorvastatin Other (See Comments)     diarrhea    Other Other (See Comments)     Passed out and because of multiple issues with this combo drug, Penstrept was taken off market in 1960s, It contained Penicillin and Streptomycin in one pill. Patient states she has taken penicillin and/or streptomycin individually without problems. Tobramycin Swelling    Molds & Smuts Other (See Comments)     coughing       Current Outpatient Medications on File Prior to Encounter   Medication Sig Dispense Refill    metoprolol tartrate (LOPRESSOR) 25 MG tablet TAKE ONE TABLET BY MOUTH TWICE A DAY (Patient taking differently: 50 mg TAKE ONE TABLET BY MOUTH TWICE A DAY) 180 tablet 2    levothyroxine (SYNTHROID) 75 MCG tablet TAKE ONE TABLET BY MOUTH DAILY 90 tablet 3    Skin Protectants, Misc. (EUCERIN) cream Apply topically as needed.  1 g 0    vitamin D (ERGOCALCIFEROL) 1.25 MG (99433 UT) CAPS capsule Take 1 capsule by mouth once a week 4 capsule 5    rosuvastatin (CRESTOR) 20 MG tablet       losartan (COZAAR) 100 MG tablet TAKE ONE TABLET BY MOUTH DAILY 90 tablet 0    furosemide (LASIX) 20 MG tablet TAKE ONE TABLET BY MOUTH DAILY AS NEEDED FOR PEDAL EDEMA 60 tablet 0    pantoprazole (PROTONIX) 40 MG tablet Take 40 mg by mouth daily       clotrimazole-betamethasone (LOTRISONE) 1-0.05 % cream Apply externally to affected area twice daily prn 45 g 1    aspirin 81 MG EC tablet Take 81 mg by mouth daily      nitroGLYCERIN (NITROSTAT) 0.4 MG SL tablet Place 0.4 mg under the tongue every 5 minutes as needed for Chest pain. Multiple Vitamins-Minerals (THERAPEUTIC MULTIVITAMIN-MINERALS) tablet Take 1 tablet by mouth daily. [DISCONTINUED] solifenacin (VESICARE) 5 MG tablet Take 5 mg by mouth daily. No current facility-administered medications on file prior to encounter. Review of Systems   Constitutional:  Negative for chills and fever. HENT:  Negative for congestion, ear pain, rhinorrhea, sore throat and trouble swallowing. Respiratory:  Positive for apnea (Hx of sleep apnea) and shortness of breath (+ SOBOE). Negative for cough and wheezing. Cardiovascular:  Positive for leg swelling. Negative for chest pain and palpitations. Gastrointestinal:  Negative for abdominal pain, anal bleeding, blood in stool, constipation, diarrhea, nausea and vomiting. Genitourinary:  Positive for difficulty urinating (At times), frequency (Depends on liquid intake) and urgency. Negative for dysuria, flank pain and hematuria. See HPI. Musculoskeletal:  Positive for arthralgias (TKR planned for 11-14-22). Skin:  Negative for rash and wound. Neurological:  Negative for dizziness and headaches. Hematological:  Bruises/bleeds easily (Bruise easily). GENERAL PHYSICAL EXAM     Vitals: BP (!) 157/85   Pulse 81   Temp 97.4 °F (36.3 °C) (Infrared)   Resp 18   Ht 5' 3\" (1.6 m)   Wt 250 lb (113.4 kg)   SpO2 98%   BMI 44.29 kg/m²               Physical Exam  Vitals reviewed. Constitutional:       General: She is not in acute distress. Appearance: She is well-developed. She is obese.  She is not ill-appearing, toxic-appearing or diaphoretic. HENT:      Head: Normocephalic. Right Ear: External ear normal.      Left Ear: External ear normal.      Nose: Nose normal.      Mouth/Throat:      Dentition: Abnormal dentition (+ dental crowns). Does not have dentures. Pharynx: No oropharyngeal exudate or posterior oropharyngeal erythema. Tonsils: No tonsillar abscesses. Eyes:      General:         Right eye: No discharge. Left eye: No discharge. Conjunctiva/sclera: Conjunctivae normal.      Pupils: Pupils are equal, round, and reactive to light. Neck:      Vascular: No carotid bruit. Cardiovascular:      Rate and Rhythm: Normal rate and regular rhythm. Pulses: Intact distal pulses. Heart sounds: Normal heart sounds. Pulmonary:      Effort: Pulmonary effort is normal. No accessory muscle usage or respiratory distress. Breath sounds: Normal breath sounds. No decreased breath sounds, wheezing, rhonchi or rales. Abdominal:      General: Bowel sounds are normal. There is no distension. Palpations: Abdomen is soft. There is no mass. Tenderness: There is no abdominal tenderness. There is no right CVA tenderness, left CVA tenderness, guarding or rebound. Musculoskeletal:      Right lower leg: No tenderness. 2+ Pitting Edema (No erythema, no warmth) present. Left lower leg: No tenderness. 2+ Pitting Edema (No erythema, no warmth) present. Comments: Negative Bettye's sign b/l (performed in sitting position). Lymphadenopathy:      Cervical: No cervical adenopathy. Skin:     General: Skin is warm and dry. Neurological:      Mental Status: She is alert and oriented to person, place, and time. Gait: Gait abnormal (Antalgic).    Psychiatric:         Behavior: Behavior normal.       LAB REVIEW     Lab Results   Component Value Date     10/28/2022    K 4.6 10/28/2022     10/28/2022    CO2 25 10/28/2022    BUN 17 10/28/2022    CREATININE 0.79 10/28/2022    GLUCOSE 102 (H) 10/28/2022    CALCIUM 8.7 10/28/2022    LABGLOM >60 10/28/2022    GFRAA >60 11/29/2021     Lab Results   Component Value Date    WBC 7.1 10/28/2022    HGB 12.6 10/28/2022    HCT 39.4 10/28/2022    MCV 90.1 10/28/2022     10/28/2022     EKG REVIEW, DATE: 5-16-22 (per [address] [place] [address]- see surgery chart)     SINUS RHYTHM  65 BPM    SURGERY / PROVISIONAL DIAGNOSES:      CYSTOSCOPY INJECTION 150 UNITS BOTOX    URGENCY OF URINATION    Patient Active Problem List    Diagnosis Date Noted    Preop examination 10/27/2022    Primary osteoarthritis of both knees 10/18/2022    Status post fall 01/05/2022    Primary osteoarthritis of right knee 05/17/2020    Actinic keratosis 02/12/2020    Polyp of colon 02/12/2020    Gastroesophageal reflux disease 02/12/2020    Helton's esophagus without dysplasia 10/22/2019    Left ventricular hypertrophy 02/21/2019    Dyslipidemia 05/07/2018    Atherosclerosis of native coronary artery of native heart without angina pectoris 05/23/2017    Asymptomatic bilateral carotid artery stenosis 03/10/2015    Bladder cancer ([facility] [address] 08/25/2014    Malignant tumor of urinary bladder ([facility] [address] 08/25/2014    DDD (degenerative disc disease), lumbar 03/04/2014    DAISY (obstructive sleep apnea)     Hypothyroid     Irritable bowel syndrome     Obesity     Hypertension          CLEARANCE: Medical and cardiac clearance noted per chart. Based on my personal evaluation of patient including review of patient's chart, no additional clearance required for scheduled surgery aside from medical and cardiac hx, which has already been obtained.     Total time spent on encounter- PAT provider minutes: 31-40 minutes     HARESH Knox CNP on 10/28/2022 at 3:48 PM

## 2022-10-28 ENCOUNTER — HOSPITAL ENCOUNTER (OUTPATIENT)
Dept: PREADMISSION TESTING | Age: 77
Discharge: HOME | End: 2022-11-01
Payer: MEDICARE

## 2022-10-28 VITALS
TEMPERATURE: 97.4 F | HEART RATE: 81 BPM | HEIGHT: 63 IN | BODY MASS INDEX: 44.3 KG/M2 | SYSTOLIC BLOOD PRESSURE: 157 MMHG | WEIGHT: 250 LBS | OXYGEN SATURATION: 98 % | DIASTOLIC BLOOD PRESSURE: 85 MMHG | RESPIRATION RATE: 18 BRPM

## 2022-10-28 DIAGNOSIS — Z01.818 PREOP TESTING: ICD-10-CM

## 2022-10-28 DIAGNOSIS — Z01.818 PREOP EXAMINATION: ICD-10-CM

## 2022-10-28 DIAGNOSIS — N39.0 URINARY TRACT INFECTION WITHOUT HEMATURIA, SITE UNSPECIFIED: Primary | ICD-10-CM

## 2022-10-28 PROBLEM — M17.0 PRIMARY OSTEOARTHRITIS OF BOTH KNEES: Status: ACTIVE | Noted: 2022-10-18

## 2022-10-28 LAB
ABSOLUTE EOS #: 0.2 K/UL (ref 0–0.4)
ABSOLUTE LYMPH #: 1.7 K/UL (ref 1–4.8)
ABSOLUTE MONO #: 0.8 K/UL (ref 0.1–1.3)
ANION GAP SERPL CALCULATED.3IONS-SCNC: 9 MMOL/L (ref 9–17)
BASOPHILS # BLD: 1 % (ref 0–2)
BASOPHILS ABSOLUTE: 0 K/UL (ref 0–0.2)
BUN BLDV-MCNC: 17 MG/DL (ref 8–23)
CALCIUM SERPL-MCNC: 8.7 MG/DL (ref 8.6–10.4)
CHLORIDE BLD-SCNC: 106 MMOL/L (ref 98–107)
CO2: 25 MMOL/L (ref 20–31)
CREAT SERPL-MCNC: 0.79 MG/DL (ref 0.5–0.9)
EOSINOPHILS RELATIVE PERCENT: 4 % (ref 0–4)
GFR SERPL CREATININE-BSD FRML MDRD: >60 ML/MIN/1.73M2
GLUCOSE BLD-MCNC: 102 MG/DL (ref 70–99)
HCT VFR BLD CALC: 39.4 % (ref 36–46)
HEMOGLOBIN: 12.6 G/DL (ref 12–16)
LYMPHOCYTES # BLD: 24 % (ref 24–44)
MCH RBC QN AUTO: 28.8 PG (ref 26–34)
MCHC RBC AUTO-ENTMCNC: 32 G/DL (ref 31–37)
MCV RBC AUTO: 90.1 FL (ref 80–100)
MONOCYTES # BLD: 12 % (ref 1–7)
PDW BLD-RTO: 13.9 % (ref 11.5–14.9)
PLATELET # BLD: 231 K/UL (ref 150–450)
PMV BLD AUTO: 7.1 FL (ref 6–12)
POTASSIUM SERPL-SCNC: 4.6 MMOL/L (ref 3.7–5.3)
RBC # BLD: 4.37 M/UL (ref 4–5.2)
SEG NEUTROPHILS: 59 % (ref 36–66)
SEGMENTED NEUTROPHILS ABSOLUTE COUNT: 4.3 K/UL (ref 1.3–9.1)
SODIUM BLD-SCNC: 140 MMOL/L (ref 135–144)
WBC # BLD: 7.1 K/UL (ref 3.5–11)

## 2022-10-28 PROCEDURE — 36415 COLL VENOUS BLD VENIPUNCTURE: CPT

## 2022-10-28 PROCEDURE — 80048 BASIC METABOLIC PNL TOTAL CA: CPT

## 2022-10-28 PROCEDURE — APPSS45 APP SPLIT SHARED TIME 31-45 MINUTES: Performed by: NURSE PRACTITIONER

## 2022-10-28 PROCEDURE — 85025 COMPLETE CBC W/AUTO DIFF WBC: CPT

## 2022-10-28 ASSESSMENT — ENCOUNTER SYMPTOMS
ANAL BLEEDING: 0
SHORTNESS OF BREATH: 1
NAUSEA: 0
ABDOMINAL PAIN: 0
WHEEZING: 0
SORE THROAT: 0
DIARRHEA: 0
CONSTIPATION: 0
VOMITING: 0
APNEA: 1
COUGH: 0
TROUBLE SWALLOWING: 0
RHINORRHEA: 0
BLOOD IN STOOL: 0

## 2022-10-28 NOTE — DISCHARGE INSTRUCTIONS
Pre-op Instructions For Out-Patient Surgery    Medication Instructions:  Please stop herbs and any supplements now (includes vitamins and minerals). Please contact your surgeon and prescribing physician for pre-op instructions for any blood thinners. Aspirin as directed. Per cardiac clearance 4 days prior to procedure. If you have inhalers/aerosol treatments at home, please use them the morning of your surgery and bring the inhalers with you to the hospital.    Please take the following medications the morning of your surgery with a sip of water:    losartan, metoprolol, levothyroxine, pantoprazole    Surgery Instructions:  After midnight before surgery:  Do not eat or drink anything, including water, mints, gum, and hard candy. You may brush your teeth without swallowing. No smoking, chewing tobacco, or street drugs. Please shower or bathe before surgery. If you were given Surgical Scrub Chlorhexidine Gluconate Liquid (CHG), please shower the night before and the morning of your surgery following the detailed instructions you received during your pre-admission visit. Please do not wear any cologne, lotion, powder, deodorant, jewelry, piercings, perfume, makeup, nail polish, hair accessories, or hair spray on the day of surgery. Wear loose comfortable clothing. Leave your valuables at home. Bring a storage case for any glasses/contacts. An adult who is responsible for you MUST drive you home and should be with you for the first 24 hours after surgery. If having out-patient knee and foot surgeries, please arrange for planned crutches, walker, or wheelchair before arriving to the hospital.    The Day of Surgery:  Arrive at [facility] Surgery Entrance at the time directed by your surgeon and check in at the desk. If you have a living will or healthcare power of , please bring a copy.     You will be taken to the pre-op holding area where you will be prepared for surgery. A physical assessment will be performed by a nurse practitioner or house officer. Your IV will be started and you will meet your anesthesiologist.    When you go to surgery, your family will be directed to the surgical waiting room, where the doctor should speak with them after your surgery. After surgery, you will be taken to the recovery room then when you are awake and stable you will go to the short stay unit for preparation to be discharged. If you use a Bi-PAP or C-PAP machine, please bring it with you and leave it in the car in case it is needed in recovery room.

## 2022-10-28 NOTE — PROGRESS NOTES
Patient is unable to provide urine specimen today, she wants to take the specimen cup home and take it to her PCP office lab in the morning, Dr Jovanni Patel office was notified and order was placed for patient to do this outpatient, pt understands that specimen should be turned in by Monday.

## 2022-10-31 ENCOUNTER — HOSPITAL ENCOUNTER (OUTPATIENT)
Age: 77
Setting detail: SPECIMEN
Discharge: HOME | End: 2022-10-31

## 2022-11-01 LAB
CULTURE: NORMAL
SPECIMEN DESCRIPTION: NORMAL

## 2022-11-10 ENCOUNTER — ANESTHESIA EVENT (OUTPATIENT)
Dept: OPERATING ROOM | Age: 77
End: 2022-11-10
Payer: MEDICARE

## 2022-11-10 NOTE — PRE-PROCEDURE INSTRUCTIONS
No answer, left message ? Unable to leave message ? When were you told to arrive at hospital ?  0600    Do you have a  ?yes    Are you on any blood thinners ? Baby ASA                 If yes when did you stop taking ? Stopped a week ago    Do you have your prep Rx filled and instruction ? N/a    Nothing to eat the day before , only clear liquids. n/a    Are you experiencing any covid symptoms ? no    Do you have any infections or rash we should be aware of ?no      Do you have the Hibiclens soap to use the night before and the morning of surgery ?n/a    Nothing to eat or drink after midnight, only a sip of water to take any medication instructed to take the night before. yes  Wear comfortable clothing, leave any valuables at home, remove any jewelry and body piercing yes .

## 2022-11-10 NOTE — PRE-PROCEDURE INSTRUCTIONS
Nothing to eat after midnight. Are you taking any blood thinners? When was the last day? Make sure to use Hibiclens prior to surgery. Remove any jewelry and body piercings. Do you wear glasses? If so, please bring a case to store them in. Are you having any Covid symptoms? Do you have any new rashes, infections, etc. that we should be aware of? Do you have a ride home the day of surgery? It cannot be a cab or medical transportation. Verify surgery time and what time to arrive at hospital.      NO ANSWER, VOICEMAIL LEFT AT 1342.

## 2022-11-11 ENCOUNTER — HOSPITAL ENCOUNTER (OUTPATIENT)
Age: 77
Setting detail: OUTPATIENT SURGERY
Discharge: HOME | End: 2022-11-11
Attending: UROLOGY | Admitting: UROLOGY
Payer: MEDICARE

## 2022-11-11 ENCOUNTER — ANESTHESIA (OUTPATIENT)
Dept: OPERATING ROOM | Age: 77
End: 2022-11-11
Payer: MEDICARE

## 2022-11-11 VITALS
HEART RATE: 63 BPM | WEIGHT: 250 LBS | SYSTOLIC BLOOD PRESSURE: 148 MMHG | RESPIRATION RATE: 16 BRPM | HEIGHT: 63 IN | BODY MASS INDEX: 44.3 KG/M2 | OXYGEN SATURATION: 100 % | DIASTOLIC BLOOD PRESSURE: 76 MMHG | TEMPERATURE: 96.8 F

## 2022-11-11 PROCEDURE — 7100000031 HC ASPR PHASE II RECOVERY - ADDTL 15 MIN: Performed by: UROLOGY

## 2022-11-11 PROCEDURE — 3700000001 HC ADD 15 MINUTES (ANESTHESIA): Performed by: UROLOGY

## 2022-11-11 PROCEDURE — 6360000002 HC RX W HCPCS: Performed by: UROLOGY

## 2022-11-11 PROCEDURE — 3600000002 HC SURGERY LEVEL 2 BASE: Performed by: UROLOGY

## 2022-11-11 PROCEDURE — 7100000011 HC PHASE II RECOVERY - ADDTL 15 MIN: Performed by: UROLOGY

## 2022-11-11 PROCEDURE — 3700000000 HC ANESTHESIA ATTENDED CARE: Performed by: UROLOGY

## 2022-11-11 PROCEDURE — 7100000010 HC PHASE II RECOVERY - FIRST 15 MIN: Performed by: UROLOGY

## 2022-11-11 PROCEDURE — 7100000001 HC PACU RECOVERY - ADDTL 15 MIN: Performed by: UROLOGY

## 2022-11-11 PROCEDURE — 3600000012 HC SURGERY LEVEL 2 ADDTL 15MIN: Performed by: UROLOGY

## 2022-11-11 PROCEDURE — 2580000003 HC RX 258: Performed by: ANESTHESIOLOGY

## 2022-11-11 PROCEDURE — 7100000030 HC ASPR PHASE II RECOVERY - FIRST 15 MIN: Performed by: UROLOGY

## 2022-11-11 PROCEDURE — 6360000002 HC RX W HCPCS: Performed by: NURSE ANESTHETIST, CERTIFIED REGISTERED

## 2022-11-11 PROCEDURE — 7100000000 HC PACU RECOVERY - FIRST 15 MIN: Performed by: UROLOGY

## 2022-11-11 PROCEDURE — 2709999900 HC NON-CHARGEABLE SUPPLY: Performed by: UROLOGY

## 2022-11-11 RX ORDER — SODIUM CHLORIDE 9 MG/ML
INJECTION, SOLUTION INTRAVENOUS PRN
Status: DISCONTINUED | OUTPATIENT
Start: 2022-11-11 | End: 2022-11-11 | Stop reason: HOSPADM

## 2022-11-11 RX ORDER — SODIUM CHLORIDE, SODIUM LACTATE, POTASSIUM CHLORIDE, CALCIUM CHLORIDE 600; 310; 30; 20 MG/100ML; MG/100ML; MG/100ML; MG/100ML
INJECTION, SOLUTION INTRAVENOUS CONTINUOUS
Status: DISCONTINUED | OUTPATIENT
Start: 2022-11-11 | End: 2022-11-11 | Stop reason: HOSPADM

## 2022-11-11 RX ORDER — LABETALOL HYDROCHLORIDE 5 MG/ML
10 INJECTION, SOLUTION INTRAVENOUS
Status: DISCONTINUED | OUTPATIENT
Start: 2022-11-11 | End: 2022-11-11 | Stop reason: HOSPADM

## 2022-11-11 RX ORDER — FENTANYL CITRATE 50 UG/ML
INJECTION, SOLUTION INTRAMUSCULAR; INTRAVENOUS PRN
Status: DISCONTINUED | OUTPATIENT
Start: 2022-11-11 | End: 2022-11-11 | Stop reason: SDUPTHER

## 2022-11-11 RX ORDER — PROPOFOL 10 MG/ML
INJECTION, EMULSION INTRAVENOUS PRN
Status: DISCONTINUED | OUTPATIENT
Start: 2022-11-11 | End: 2022-11-11 | Stop reason: SDUPTHER

## 2022-11-11 RX ORDER — SODIUM CHLORIDE 0.9 % (FLUSH) 0.9 %
5-40 SYRINGE (ML) INJECTION EVERY 12 HOURS SCHEDULED
Status: DISCONTINUED | OUTPATIENT
Start: 2022-11-11 | End: 2022-11-11 | Stop reason: HOSPADM

## 2022-11-11 RX ORDER — SODIUM CHLORIDE 0.9 % (FLUSH) 0.9 %
5-40 SYRINGE (ML) INJECTION PRN
Status: DISCONTINUED | OUTPATIENT
Start: 2022-11-11 | End: 2022-11-11 | Stop reason: HOSPADM

## 2022-11-11 RX ORDER — METOCLOPRAMIDE HYDROCHLORIDE 5 MG/ML
10 INJECTION INTRAMUSCULAR; INTRAVENOUS
Status: DISCONTINUED | OUTPATIENT
Start: 2022-11-11 | End: 2022-11-11 | Stop reason: HOSPADM

## 2022-11-11 RX ORDER — LIDOCAINE HYDROCHLORIDE 10 MG/ML
1 INJECTION, SOLUTION EPIDURAL; INFILTRATION; INTRACAUDAL; PERINEURAL
Status: DISCONTINUED | OUTPATIENT
Start: 2022-11-11 | End: 2022-11-11 | Stop reason: HOSPADM

## 2022-11-11 RX ORDER — ONDANSETRON 2 MG/ML
4 INJECTION INTRAMUSCULAR; INTRAVENOUS
Status: DISCONTINUED | OUTPATIENT
Start: 2022-11-11 | End: 2022-11-11 | Stop reason: HOSPADM

## 2022-11-11 RX ORDER — HYDROCODONE BITARTRATE AND ACETAMINOPHEN 5; 325 MG/1; MG/1
1 TABLET ORAL EVERY 6 HOURS PRN
Status: DISCONTINUED | OUTPATIENT
Start: 2022-11-11 | End: 2022-11-11 | Stop reason: HOSPADM

## 2022-11-11 RX ORDER — DIPHENHYDRAMINE HYDROCHLORIDE 50 MG/ML
12.5 INJECTION INTRAMUSCULAR; INTRAVENOUS
Status: DISCONTINUED | OUTPATIENT
Start: 2022-11-11 | End: 2022-11-11 | Stop reason: HOSPADM

## 2022-11-11 RX ORDER — HYDRALAZINE HYDROCHLORIDE 20 MG/ML
10 INJECTION INTRAMUSCULAR; INTRAVENOUS
Status: DISCONTINUED | OUTPATIENT
Start: 2022-11-11 | End: 2022-11-11 | Stop reason: HOSPADM

## 2022-11-11 RX ORDER — SODIUM CHLORIDE 9 MG/ML
25 INJECTION, SOLUTION INTRAVENOUS PRN
Status: DISCONTINUED | OUTPATIENT
Start: 2022-11-11 | End: 2022-11-11 | Stop reason: HOSPADM

## 2022-11-11 RX ORDER — FENTANYL CITRATE 0.05 MG/ML
25 INJECTION, SOLUTION INTRAMUSCULAR; INTRAVENOUS EVERY 5 MIN PRN
Status: DISCONTINUED | OUTPATIENT
Start: 2022-11-11 | End: 2022-11-11 | Stop reason: HOSPADM

## 2022-11-11 RX ADMIN — FENTANYL CITRATE 25 MCG: 50 INJECTION, SOLUTION INTRAMUSCULAR; INTRAVENOUS at 08:07

## 2022-11-11 RX ADMIN — SODIUM CHLORIDE, POTASSIUM CHLORIDE, SODIUM LACTATE AND CALCIUM CHLORIDE: 600; 310; 30; 20 INJECTION, SOLUTION INTRAVENOUS at 06:28

## 2022-11-11 RX ADMIN — CEFAZOLIN 2000 MG: 10 INJECTION, POWDER, FOR SOLUTION INTRAVENOUS at 08:05

## 2022-11-11 RX ADMIN — FENTANYL CITRATE 25 MCG: 50 INJECTION, SOLUTION INTRAMUSCULAR; INTRAVENOUS at 08:15

## 2022-11-11 RX ADMIN — PROPOFOL 50 MG: 10 INJECTION, EMULSION INTRAVENOUS at 08:07

## 2022-11-11 RX ADMIN — PROPOFOL 50 MCG/KG/MIN: 10 INJECTION, EMULSION INTRAVENOUS at 08:08

## 2022-11-11 ASSESSMENT — PAIN SCALES - GENERAL: PAINLEVEL_OUTOF10: 0

## 2022-11-11 ASSESSMENT — PAIN - FUNCTIONAL ASSESSMENT: PAIN_FUNCTIONAL_ASSESSMENT: 0-10

## 2022-11-11 NOTE — ANESTHESIA POSTPROCEDURE EVALUATION
Department of Anesthesiology  Postprocedure Note    Patient: Kg Preciado  MRN: [id number]  YOB: 1945  Date of evaluation: 11/11/2022      Procedure Summary     Date: 11/11/22 Room / Location: [address] [place] [address] / [facility] [address]    Anesthesia Start: 0800 Anesthesia Stop: 6604    Procedure: CYSTOSCOPY INJECTION 150 UNITS BOTOX (Bladder) Diagnosis:       Urgency of urination      (URGENCY OF URINATION)    Surgeons: Paul Gonzalez MD Responsible Provider: Lea Chandler MD    Anesthesia Type: MAC ASA Status: 3          Anesthesia Type: No value filed.     Rick Phase I: Rick Score: 8    Rick Phase II: Rick Score: 10      Anesthesia Post Evaluation    Comments: POST- ANESTHESIA EVALUATION       Pt Name: Kg Preciado  MRN: [id number]  YOB: 1945  Date of evaluation: 11/11/2022  Time:  10:25 AM      BP (!) 148/76   Pulse 63   Temp 96.8 °F (36 °C) (Infrared)   Resp 16   Ht 5' 3\" (1.6 m)   Wt 250 lb (113.4 kg)   SpO2 100%   BMI 44.29 kg/m²      Consciousness Level  Awake  Cardiopulmonary Status  Stable  Pain Adequately Treated YES  Nausea / Vomiting  NO  Adequate Hydration  YES  Anesthesia Related Complications NONE      Electronically signed by Lea Chandler MD on 11/11/2022 at 10:25 AM

## 2022-11-11 NOTE — ANESTHESIA PRE PROCEDURE
Department of Anesthesiology  Preprocedure Note       Name:  Jourdan Maddox   Age:  76 y.o.  :  1945                                          MRN:  [id number]         Date:  2022      Surgeon: Katherin Ulrich):  Carie Soto MD    Procedure: Procedure(s):  CYSTOSCOPY INJECTION 150 UNITS BOTOX    Medications prior to admission:   Prior to Admission medications    Medication Sig Start Date End Date Taking? Authorizing Provider   metoprolol tartrate (LOPRESSOR) 25 MG tablet TAKE ONE TABLET BY MOUTH TWICE A DAY  Patient taking differently: 50 mg TAKE ONE TABLET BY MOUTH TWICE A DAY 3/29/22   Valeria Mckeon APRN - CNP   levothyroxine (SYNTHROID) 75 MCG tablet TAKE ONE TABLET BY MOUTH DAILY 21   Nestor Choi MD   Skin Protectants, Misc. (EUCERIN) cream Apply topically as needed. 21   Angelia Elizalde MD   vitamin D (ERGOCALCIFEROL) 1.25 MG (49548 UT) CAPS capsule Take 1 capsule by mouth once a week 21   Nestor Choi MD   rosuvastatin (CRESTOR) 20 MG tablet  21   Historical Provider, MD   losartan (COZAAR) 100 MG tablet TAKE ONE TABLET BY MOUTH DAILY 3/5/21   Scott Penny MD   furosemide (LASIX) 20 MG tablet TAKE ONE TABLET BY MOUTH DAILY AS NEEDED FOR PEDAL EDEMA 20   Scott Penny MD   pantoprazole (PROTONIX) 40 MG tablet Take 40 mg by mouth daily  19   Historical Provider, MD   clotrimazole-betamethasone (Carrol Morgans) 1-0.05 % cream Apply externally to affected area twice daily prn 19   Carie Soto MD   aspirin 81 MG EC tablet Take 81 mg by mouth daily    Historical Provider, MD   nitroGLYCERIN (NITROSTAT) 0.4 MG SL tablet Place 0.4 mg under the tongue every 5 minutes as needed for Chest pain. Historical Provider, MD   Multiple Vitamins-Minerals (THERAPEUTIC MULTIVITAMIN-MINERALS) tablet Take 1 tablet by mouth daily. Historical Provider, MD   solifenacin (VESICARE) 5 MG tablet Take 5 mg by mouth daily.   21  Historical Provider, MD       Current medications:    No current facility-administered medications for this visit. No current outpatient medications on file. Facility-Administered Medications Ordered in Other Visits   Medication Dose Route Frequency Provider Last Rate Last Admin    lidocaine PF 1 % injection 1 mL  1 mL IntraDERmal Once PRN Daniele Vásquez MD        lactated ringers infusion   IntraVENous Continuous Daniele Vásquez  mL/hr at 11/11/22 0632 Restarted at 11/11/22 0724    sodium chloride flush 0.9 % injection 5-40 mL  5-40 mL IntraVENous 2 times per day Daniele Vásquez MD        sodium chloride flush 0.9 % injection 5-40 mL  5-40 mL IntraVENous PRN Daniele Vásquez MD        0.9 % sodium chloride infusion   IntraVENous PRN Daniele Vásquez MD        Onabotulinumtoxin A (BOTOX) injection 150 Units  150 Units IntraMUSCular Once Scarlet Morgan MD           Allergies: Allergies   Allergen Reactions    Atorvastatin Other (See Comments)     diarrhea    Other Other (See Comments)     Passed out and because of multiple issues with this combo drug, Penstrept was taken off market in 1960s, It contained Penicillin and Streptomycin in one pill. Patient states she has taken penicillin and/or streptomycin individually without problems.     Tobramycin Swelling    Molds & Smuts Other (See Comments)     coughing       Problem List:    Patient Active Problem List   Diagnosis Code    Irritable bowel syndrome K58.9    Obesity E66.9    Hypertension I10    DAISY (obstructive sleep apnea) G47.33    Hypothyroid E03.9    DDD (degenerative disc disease), lumbar M51.36    Bladder cancer (HCC) C67.9    Asymptomatic bilateral carotid artery stenosis I65.23    Dyslipidemia E78.5    Left ventricular hypertrophy I51.7    Actinic keratosis L57.0    Malignant tumor of urinary bladder (HCC) C67.9    Atherosclerosis of native coronary artery of native heart without angina pectoris I25.10    Polyp of colon K63.5    Helton's esophagus without dysplasia K22.70    Gastroesophageal reflux disease K21.9    Primary osteoarthritis of right knee M17.11    Status post fall Z91.81    Preop examination Z01.818    Primary osteoarthritis of both knees M17.0       Past Medical History:        Diagnosis Date    AK (actinic keratosis)     from sun exposure    Anesthesia     HARD TIME WAKING UP \"SOMETIMES\".  Arthritis     Asymptomatic bilateral carotid artery stenosis 03/10/2015    Atherosclerosis of native coronary artery of native heart without angina pectoris 5/23/2017    Bladder cancer ([facility] [address]     bladder, first time 1993    CAD (coronary artery disease)     x3 stents    Colon polyp     Dr. Adelina leal present     Diarrhea     Difficult intravenous access     Difficult intubation     SMALL MOUTH AND AIRWAY    Fall     in bath tub 11-24-21, left knee warm and bruised, BRUISED TO CHEEKS AND NOSE, ARMS, KNEES AND LEFT THIGHS. Renetta Quiroga  GERD (gastroesophageal reflux disease)     Head injury 11/24/2021    Per [facility] chart- \"closed head injury, fell in the bath tub\"    History of atrial fibrillation     \"Very seldom\" per patient    Hypertension     Hypothyroid     Obesity     DAISY (obstructive sleep apnea)     NO MACHINE, she has bad sinus issues and requested alternatives instead of a machine, nothing was done    PAC (premature atrial contraction)     occasionally    Prolonged emergence from general anesthesia     Stress incontinence     Syncope     Per [facility] chart    Wears glasses     FOR READING. Past Surgical History:        Procedure Laterality Date    BLADDER SURGERY  8/1993 and 10/2002    For bladder cancer treatment (patient states through cystoscopies)    BREAST BIOPSY      LT. BREAST (BENIGN). , large mass removed    CHOLECYSTECTOMY      COLONOSCOPY      3-4X    COLONOSCOPY  07/19/2019    Procedure: COLONOSCOPY AND POLYPECTOMY; Surgeon: Tate Lin MD; Location: [facility]; Service: Gastroenterology; Laterality: N/A;    CORONARY ANGIOPLASTY  2014    3 stents inserted via heart cath    CYST REMOVAL Right     3RD FINGER.    CYSTOSCOPY      Dr Kathleen Phillips  2015    TURB-bladder tumors removed    CYSTOSCOPY  2016    fulgeration with biopsies    CYSTOSCOPY N/A 2020    CYSTO BOTOX INJECTION 100UNITS performed by Cristian Koch MD at [facility] 2020    CYSTOSCOPY INJECTION BOTOX 100 UNITS performed by Cristian Koch MD at [facility] 12/10/2021    CYSTOSCOPY BOTOX 100 UNITS performed by Cristian Koch MD at [address] [place] [address] ESOPHAGOGASTRODUODENOSCOPY  2019    Procedure: EGD with BX; Surgeon: Joleen Herrera MD; Location: [facility]; Service: Gastroenterology; Laterality: N/A;    ESOPHAGOGASTRODUODENOSCOPY  2022    Procedure: ESOPHAGOGASTRODUODENOSCOPY; Surgeon: Rosio Ndiaye MD; Location: [facility]; Service: Gastroenterology; Laterality: N/A;    EYE SURGERY      LASIK MACIEJ. EYES.    HEMORRHOID SURGERY      HYSTERECTOMY, TOTAL ABDOMINAL (CERVIX REMOVED)      unsure if ovaries remain    OTHER SURGICAL HISTORY      abscess on tailbone removed    OTHER SURGICAL HISTORY Bilateral 2020    Stem cell injections    OTHER SURGICAL HISTORY      Stem cell injection to lower back- not a surgery- just injection    TONSILLECTOMY         Social History:    Social History     Tobacco Use    Smoking status: Former     Packs/day: 1.50     Years: 30.00     Pack years: 45.00     Types: Cigarettes     Quit date: 1992     Years since quittin.8    Smokeless tobacco: Never   Substance Use Topics    Alcohol use: Yes     Comment: COUPLE DRINKS PER WEEK. Counseling given: Not Answered      Vital Signs (Current): There were no vitals filed for this visit.                                            BP Readings from Last 3 Encounters:   22 (!) 168/82   10/28/22 (!) 157/85   10/04/22 126/78       NPO Status:                                                                                 BMI:   Wt Readings from Last 3 Encounters:   11/11/22 250 lb (113.4 kg)   10/28/22 250 lb (113.4 kg)   10/04/22 248 lb (112.5 kg)     There is no height or weight on file to calculate BMI.    CBC:   Lab Results   Component Value Date/Time    WBC 7.1 10/28/2022 11:37 AM    RBC 4.37 10/28/2022 11:37 AM    HGB 12.6 10/28/2022 11:37 AM    HCT 39.4 10/28/2022 11:37 AM    MCV 90.1 10/28/2022 11:37 AM    RDW 13.9 10/28/2022 11:37 AM     10/28/2022 11:37 AM       CMP:   Lab Results   Component Value Date/Time     10/28/2022 11:37 AM    K 4.6 10/28/2022 11:37 AM     10/28/2022 11:37 AM    CO2 25 10/28/2022 11:37 AM    BUN 17 10/28/2022 11:37 AM    CREATININE 0.79 10/28/2022 11:37 AM    GFRAA >60 11/29/2021 10:30 AM    LABGLOM >60 10/28/2022 11:37 AM    GLUCOSE 102 10/28/2022 11:37 AM    CALCIUM 8.7 10/28/2022 11:37 AM       POC Tests: No results for input(s): POCGLU, POCNA, POCK, POCCL, POCBUN, POCHEMO, POCHCT in the last 72 hours. Coags: No results found for: PROTIME, INR, APTT    HCG (If Applicable): No results found for: PREGTESTUR, PREGSERUM, HCG, HCGQUANT     ABGs: No results found for: PHART, PO2ART, PKH1QIK, NLU3RPM, BEART, D6HFKITE     Type & Screen (If Applicable):  No results found for: LABABO, LABRH    Drug/Infectious Status (If Applicable):  No results found for: HIV, HEPCAB    COVID-19 Screening (If Applicable):   Lab Results   Component Value Date/Time    COVID19 Not Detected 11/13/2020 02:25 PM         Anesthesia Evaluation  Patient summary reviewed and Nursing notes reviewed   history of anesthetic complications: history of difficult intubation.   Airway: Mallampati: III  TM distance: <3 FB   Neck ROM: full  Mouth opening: > = 3 FB   Dental: normal exam         Pulmonary:normal exam  breath sounds clear to auscultation  (+) sleep apnea: on noncompliant,                             Cardiovascular:    (+) hypertension:, CAD:, CABG/stent: no interval change, dysrhythmias: atrial fibrillation, hyperlipidemia      ECG reviewed  Rhythm: regular  Rate: normal  Echocardiogram reviewed         Beta Blocker:  Dose within 24 Hrs      ROS comment: Holter monitor     Neuro/Psych:   Negative Neuro/Psych ROS              GI/Hepatic/Renal:   (+) GERD:, morbid obesity          Endo/Other:    (+) hypothyroidism::., malignancy/cancer (bladder). Abdominal:             Vascular:           ROS comment: B/l carotid artery stenosis. Other Findings:             Anesthesia Plan      MAC     ASA 3       Induction: intravenous. MIPS: Postoperative opioids intended and Prophylactic antiemetics administered. Anesthetic plan and risks discussed with patient. Plan discussed with CRNA.                     Naif Penn MD   11/11/2022

## 2022-11-11 NOTE — INTERVAL H&P NOTE
Update History & Physical    The patient's History and Physical of October 28, 2022 was reviewed with the patient and I examined the patient. There was no change. The surgical site was confirmed by the patient and me. Patient will be having :CYSTOSCOPY INJECTION 150 UNITS BOTOX    Patient has been NPO since midnight. No blood thinners in the past 5-7 days. ( Aspirin) Patient   took  lopressor, protonix , losartan this am with sip of water. Patient has prolonged emergence with general anesthesia. Patient did obtain medical and cardiac clearance. Patient was physically assessed, including cardiovascular and respiratory.        Electronically signed by HARESH Cruz CNP on 11/11/2022 at 5:57 AM

## 2022-11-11 NOTE — BRIEF OP NOTE
Brief Postoperative Note      Patient: Tim Em  YOB: 1945  MRN: [id number]    Date of Procedure: 11/11/2022    Pre-Op Diagnosis: URGENCY OF URINATION    Post-Op Diagnosis: Same       Procedure(s):  CYSTOSCOPY INJECTION 150 UNITS BOTOX    Surgeon(s):  Chelsea Monte MD    Assistant:  * No surgical staff found *    Anesthesia: Monitor Anesthesia Care    Estimated Blood Loss (mL): Minimal    Complications: None    Specimens:   * No specimens in log *    Implants:  * No implants in log *      Drains: * No LDAs found *    Findings: normal cysto, botox 150U injected,     Electronically signed by Chelsea Monte MD on 11/11/2022 at 8:29 AM

## 2022-11-11 NOTE — DISCHARGE INSTRUCTIONS
DISCHARGE INSTRUCTIONS FOR CYSTOSCOPY    In order to continue your care at home, please follow the instructions below. For General Anesthesia  Do not drink any alcoholic beverages or make any legal or important decisions for 24 hours. No driving or operating machinery for 24 hours. Diet    Drink plenty of fluids after surgery, unless you are on a fluid restriction. After general anesthesia, start out eating lightly (broth, soup, crackers, toast, etc.) advancing as tolerated to your usual diet. Try to avoid spicy or greasy/fatty foods for 24 hours. Avoid milk products for several hours. Medications  Take medications as instructed by your surgeon. Please do not take prescribed pain medication with alcoholic beverages. Do not drive or operate machinery while taking any prescribed pain medication. Activities  As instructed by your surgeon. Limit your activities for 24 hours. Avoid heavy work or sports until surgeon approves. No lifting, pushing, pulling, straining until surgeon approves. You may shower. Surgery Area or Examination Site  After the cystoscopy, your urethra may be sore at first, and it may burn when you urinate for the first few days after the procedure. You may feel the need to urinate more often, and your urine may be pink. These symptoms should get better in 1 or 2 days. Call your surgeon for the following: You have pain that does not get better after you take pain medicine. For an oral temperature (by mouth) is 101 degrees or higher, chills, or excessive sweating. Persistent nausea or vomiting and cant keep fluids down. You have trouble passing urine or stool, especially if you have pain or swelling in your lower belly. If you are unable to urinate within 8 hours of surgery. Your urine is still red or you see blood clots after you have urinated several times. Your urine gets cloudy or smells bad. You have pain in your back just below your rib cage.  This is called flank pain. You have pain or burning when you urinate that continues after 2 days. You have a frequent urge to urinate but can pass only small amounts of urine. Redness or swelling at IV site. For any questions or concerns you may have.

## 2022-11-13 NOTE — OP NOTE
[address]                 [address] [facility], [address]                                OPERATIVE REPORT    PATIENT NAME: Josefina Bunn                   :        1945  MED REC NO:   [id number]                              ROOM:  ACCOUNT NO:   [de-identified]                           ADMIT DATE: 2022  PROVIDER:     Yvette Johnson    DATE OF PROCEDURE:  2022    PREOPERATIVE DIAGNOSES:  1. Overactive bladder with urge incontinence. 2.  History of bladder cancer. POSTOPERATIVE DIAGNOSES:  1. Overactive bladder with urge incontinence. 2.  History of bladder cancer. PROCEDURE PERFORMED:  Cystoscopy with intravesical Botox injection, 150  units. SURGEON:  Yvette Johnson MD    ANESTHESIA:  MAC.    COMPLICATIONS:  None. BLEEDING:  None. SPECIMENS:  None. HISTORY OF PRESENT ILLNESS:  The patient is a 61-year-old female well  known to me with history of overactive bladder. She does well with  Botox injections for which she is here today. She also is here for  surveillance cystoscopy for history of bladder cancer. PROCEDURE IN DETAIL:  The patient was brought back to the operating room  and laid on the operative table in supine position. Once MAC anesthesia  was obtained, she was placed in the dorsal lithotomy position and  prepped and draped in the usual sterile fashion. A time-out was  performed and she was properly identified. Antibiotics were  administered. The cystoscope was inserted through the urethra and  bladder. The bladder was visualized in its entirety and was  unremarkable. No tumors or stones were seen. At that point, 150 units  of Botox was then injected throughout the bladder. Once all injections  were complete, the procedure was completed. She awoke from anesthesia  without any complications and was taken back to postop anesthesia care  in good condition.   She will be discharged home today and will follow up  as an outpatient.         Keaton Bloom    D: 11/12/2022 10:38:46       T: 11/12/2022 10:41:01     [email or web address]  Job#: [id number]     Doc#: [id number]    CC:

## 2022-11-26 PROBLEM — Z01.818 PREOP EXAMINATION: Status: RESOLVED | Noted: 2022-10-27 | Resolved: 2022-11-26

## 2023-01-06 ENCOUNTER — OFFICE VISIT (OUTPATIENT)
Dept: PRIMARY CARE CLINIC | Age: 78
End: 2023-01-06

## 2023-01-06 VITALS
OXYGEN SATURATION: 97 % | WEIGHT: 256.6 LBS | SYSTOLIC BLOOD PRESSURE: 138 MMHG | BODY MASS INDEX: 45.45 KG/M2 | HEART RATE: 89 BPM | RESPIRATION RATE: 22 BRPM | DIASTOLIC BLOOD PRESSURE: 84 MMHG

## 2023-01-06 DIAGNOSIS — R06.00 DYSPNEA, UNSPECIFIED TYPE: ICD-10-CM

## 2023-01-06 DIAGNOSIS — C67.9 MALIGNANT NEOPLASM OF URINARY BLADDER, UNSPECIFIED SITE (HCC): ICD-10-CM

## 2023-01-06 DIAGNOSIS — F51.01 PRIMARY INSOMNIA: Primary | ICD-10-CM

## 2023-01-06 RX ORDER — ZOLPIDEM TARTRATE 5 MG/1
5 TABLET ORAL NIGHTLY PRN
Qty: 30 TABLET | Refills: 0 | Status: SHIPPED | OUTPATIENT
Start: 2023-01-06 | End: 2023-02-05

## 2023-01-06 SDOH — ECONOMIC STABILITY: FOOD INSECURITY: WITHIN THE PAST 12 MONTHS, THE FOOD YOU BOUGHT JUST DIDN'T LAST AND YOU DIDN'T HAVE MONEY TO GET MORE.: NEVER TRUE

## 2023-01-06 SDOH — ECONOMIC STABILITY: FOOD INSECURITY: WITHIN THE PAST 12 MONTHS, YOU WORRIED THAT YOUR FOOD WOULD RUN OUT BEFORE YOU GOT MONEY TO BUY MORE.: NEVER TRUE

## 2023-01-06 ASSESSMENT — ENCOUNTER SYMPTOMS
ABDOMINAL DISTENTION: 0
SHORTNESS OF BREATH: 0
ABDOMINAL PAIN: 0
COUGH: 0
RHINORRHEA: 0
BACK PAIN: 0
EYE ITCHING: 0
WHEEZING: 0
EYE DISCHARGE: 0

## 2023-01-06 ASSESSMENT — SOCIAL DETERMINANTS OF HEALTH (SDOH): HOW HARD IS IT FOR YOU TO PAY FOR THE VERY BASICS LIKE FOOD, HOUSING, MEDICAL CARE, AND HEATING?: NOT HARD AT ALL

## 2023-01-06 ASSESSMENT — PATIENT HEALTH QUESTIONNAIRE - PHQ9
SUM OF ALL RESPONSES TO PHQ9 QUESTIONS 1 & 2: 0
1. LITTLE INTEREST OR PLEASURE IN DOING THINGS: 0
SUM OF ALL RESPONSES TO PHQ QUESTIONS 1-9: 0
SUM OF ALL RESPONSES TO PHQ QUESTIONS 1-9: 0
2. FEELING DOWN, DEPRESSED OR HOPELESS: 0
SUM OF ALL RESPONSES TO PHQ QUESTIONS 1-9: 0
SUM OF ALL RESPONSES TO PHQ QUESTIONS 1-9: 0

## 2023-01-06 NOTE — PROGRESS NOTES
[facility] [place] [facility]\"" PRIMARY CARE  . [address] [address]  Dept: [phone]  Dept Fax: [phone]    Raine Snyder is a 76 y.o. female who presents today for her medical conditions/complaints as noted below. Chief Complaint   Patient presents with    6 Month Follow-Up    Insomnia    Shortness of Breath       HPI:     76 y. o. F presented to office as a follow-up visit. Patient had recent surgery for her right knee. Still has mild swelling and pain right knee. But is recovering well. She also mentioned that she has been having worsening shortness of breath even with walking a block of stairs. Does have worsening pedal edema. Otherwise denied any associated chest pain, recent change in medications or diet. Has upcoming appointment with cardiology coming week. Other chronic comorbidities stable. No other current complaints. Vital signs stable. Advised lifestyle changes. Medications reviewed and refilled as appropriate, problem list updated. All concerns discussed in details and all questions answered to patient satisfaction. Hemoglobin A1C (%)   Date Value   12/14/2020 5.7             ( goal A1C is < 7)   No results found for: LABMICR  LDL Cholesterol (mg/dL)   Date Value   07/06/2022 61   12/14/2020 117     LDL Calculated (mg/dL)   Date Value   01/13/2017 129   10/06/2015 83   08/20/2014 66       (goal LDL is <100)   BUN (mg/dL)   Date Value   10/28/2022 17     BP Readings from Last 3 Encounters:   01/06/23 138/84   11/11/22 (!) 148/76   10/28/22 (!) 157/85          (goal 120/80)    Past Medical History:   Diagnosis Date    AK (actinic keratosis)     from sun exposure    Anesthesia     HARD TIME WAKING UP \"SOMETIMES\".     Arthritis     Asymptomatic bilateral carotid artery stenosis 03/10/2015    Atherosclerosis of native coronary artery of native heart without angina pectoris 5/23/2017    Bladder cancer ([facility] [address]     bladder, first time 1993    CAD (coronary artery disease)     x3 stents    Colon polyp     Dr. Karthik Avilez Small crowns present     Diarrhea     Difficult intravenous access     Difficult intubation     SMALL MOUTH AND AIRWAY    Fall     in bath tub 11-24-21, left knee warm and bruised, BRUISED TO CHEEKS AND NOSE, ARMS, KNEES AND LEFT THIGHS. Linda Morris GERD (gastroesophageal reflux disease)     Head injury 11/24/2021    Per [facility] chart- \"closed head injury, fell in the bath tub\"    History of atrial fibrillation     \"Very seldom\" per patient    Hypertension     Hypothyroid     Obesity     DAISY (obstructive sleep apnea)     NO MACHINE, she has bad sinus issues and requested alternatives instead of a machine, nothing was done    PAC (premature atrial contraction)     occasionally    Prolonged emergence from general anesthesia     Stress incontinence     Syncope     Per [facility] chart    Wears glasses     FOR READING. Past Surgical History:   Procedure Laterality Date    BLADDER SURGERY  8/1993 and 10/2002    For bladder cancer treatment (patient states through cystoscopies)    BREAST BIOPSY      LT. BREAST (BENIGN). , large mass removed    CHOLECYSTECTOMY      COLONOSCOPY      3-4X    COLONOSCOPY  07/19/2019    Procedure: COLONOSCOPY AND POLYPECTOMY; Surgeon: Sammy Dixon MD; Location: [facility]; Service: Gastroenterology; Laterality: N/A;    CORONARY ANGIOPLASTY  07/03/2014    3 stents inserted via heart cath    CYST REMOVAL Right     3RD FINGER.     CYSTOSCOPY  2014    Dr Gordon Shown  11/17/2015    TURB-bladder tumors removed    CYSTOSCOPY  11/18/2016    fulgeration with biopsies    CYSTOSCOPY N/A 01/21/2020    CYSTO BOTOX INJECTION 100UNITS performed by Selam Hobbs MD at [facility] N/A 11/17/2020    CYSTOSCOPY INJECTION BOTOX 100 UNITS performed by Selam Hobbs MD at [facility] 27 N/A 12/10/2021    CYSTOSCOPY BOTOX 100 UNITS performed by Selam Hobbs MD at [address]  CYSTOSCOPY N/A 2022    CYSTOSCOPY INJECTION 150 UNITS BOTOX performed by Keshav Carson MD at [address]  2019    Procedure: EGD with BX; Surgeon: Thu Kulkarni MD; Location: [facility]; Service: Gastroenterology; Laterality: N/A;    ESOPHAGOGASTRODUODENOSCOPY  2022    Procedure: ESOPHAGOGASTRODUODENOSCOPY; Surgeon: Femi Howard MD; Location: [facility]; Service: Gastroenterology; Laterality: N/A;    EYE SURGERY      LASIK MACIEJ. EYES.    HEMORRHOID SURGERY      HYSTERECTOMY, TOTAL ABDOMINAL (CERVIX REMOVED)      unsure if ovaries remain    OTHER SURGICAL HISTORY      abscess on tailbone removed    OTHER SURGICAL HISTORY Bilateral 2020    Stem cell injections    OTHER SURGICAL HISTORY      Stem cell injection to lower back- not a surgery- just injection    TONSILLECTOMY         Family History   Problem Relation Age of Onset    Cancer Mother         pancreatic age 80    Other Mother         Vascular disease    Cancer Father         bladder age 80    Heart Disease Maternal Grandmother     Heart Disease Maternal Grandfather     Heart Disease Paternal Grandfather     Cancer Paternal Aunt         ovarian       Social History     Tobacco Use    Smoking status: Former     Packs/day: 1.50     Years: 30.00     Pack years: 45.00     Types: Cigarettes     Quit date: 1992     Years since quittin.0    Smokeless tobacco: Never   Substance Use Topics    Alcohol use: Yes     Comment: COUPLE DRINKS PER WEEK. Current Outpatient Medications   Medication Sig Dispense Refill    zolpidem (AMBIEN) 5 MG tablet Take 1 tablet by mouth nightly as needed for Sleep for up to 30 days.  Max Daily Amount: 5 mg 30 tablet 0    metoprolol tartrate (LOPRESSOR) 25 MG tablet TAKE ONE TABLET BY MOUTH TWICE A DAY (Patient taking differently: 50 mg TAKE ONE TABLET BY MOUTH TWICE A DAY) 180 tablet 2    levothyroxine (SYNTHROID) 75 MCG tablet TAKE ONE TABLET BY MOUTH DAILY 90 tablet 3    Skin Protectants, Misc. (EUCERIN) cream Apply topically as needed. 1 g 0    vitamin D (ERGOCALCIFEROL) 1.25 MG (81493 UT) CAPS capsule Take 1 capsule by mouth once a week 4 capsule 5    rosuvastatin (CRESTOR) 20 MG tablet       losartan (COZAAR) 100 MG tablet TAKE ONE TABLET BY MOUTH DAILY 90 tablet 0    furosemide (LASIX) 20 MG tablet TAKE ONE TABLET BY MOUTH DAILY AS NEEDED FOR PEDAL EDEMA 60 tablet 0    pantoprazole (PROTONIX) 40 MG tablet Take 40 mg by mouth daily       clotrimazole-betamethasone (LOTRISONE) 1-0.05 % cream Apply externally to affected area twice daily prn 45 g 1    aspirin 81 MG EC tablet Take 81 mg by mouth daily      nitroGLYCERIN (NITROSTAT) 0.4 MG SL tablet Place 0.4 mg under the tongue every 5 minutes as needed for Chest pain. Multiple Vitamins-Minerals (THERAPEUTIC MULTIVITAMIN-MINERALS) tablet Take 1 tablet by mouth daily. No current facility-administered medications for this visit. Allergies   Allergen Reactions    Atorvastatin Other (See Comments)     diarrhea    Molds & Smuts Other (See Comments)     coughing  coughing    Other Other (See Comments)     Passed out and because of multiple issues with this combo drug, Penstrept was taken off market in 1960s, It contained Penicillin and Streptomycin in one pill. Patient states she has taken penicillin and/or streptomycin individually without problems.   Penstrept which ws taken off market in 1960s, Penicillin and Streptomycin    Tobramycin Swelling       Health Maintenance   Topic Date Due    DTaP/Tdap/Td vaccine (1 - Tdap) Never done    Shingles vaccine (1 of 2) Never done    Pneumococcal 65+ years Vaccine (1 - PCV) Never done    COVID-19 Vaccine (5 - Booster for Pfizer series) 09/12/2022    Annual Wellness Visit (AWV)  09/29/2022    Hepatitis C screen  07/06/2023 (Originally 4/7/1963)    Flu vaccine (1) 01/06/2024 (Originally 8/1/2022)    Lipids  07/06/2023    Depression Screen  07/06/2023    DEXA (modify frequency per FRAX score)  Addressed    Hepatitis A vaccine  Aged Out    Hib vaccine  Aged Out    Meningococcal (ACWY) vaccine  Aged Out       Subjective:      Review of Systems   Constitutional:  Negative for chills, fatigue and fever. HENT:  Negative for congestion, hearing loss and rhinorrhea. Eyes:  Negative for discharge and itching. Respiratory:  Negative for cough, shortness of breath and wheezing. Cardiovascular:  Negative for chest pain, palpitations and leg swelling. Gastrointestinal:  Negative for abdominal distention and abdominal pain. Endocrine: Negative for polydipsia and polyphagia. Genitourinary:  Negative for difficulty urinating and dysuria. Musculoskeletal:  Negative for arthralgias and back pain. Right knee pain and swelling. Positive for bilateral pedal edema   Skin:  Negative for rash and wound. Neurological:  Negative for dizziness, seizures, light-headedness and headaches. Psychiatric/Behavioral:  Negative for agitation and behavioral problems. Objective:     Physical Exam  Constitutional:       Appearance: She is well-developed. She is obese. HENT:      Head: Normocephalic and atraumatic. Eyes:      Conjunctiva/sclera: Conjunctivae normal.      Pupils: Pupils are equal, round, and reactive to light. Neck:      Thyroid: No thyromegaly. Vascular: No JVD. Cardiovascular:      Rate and Rhythm: Normal rate and regular rhythm. Heart sounds: Normal heart sounds. No murmur heard. Pulmonary:      Effort: Pulmonary effort is normal.      Breath sounds: Normal breath sounds. No wheezing. Abdominal:      General: Bowel sounds are normal. There is no distension. Palpations: Abdomen is soft. Tenderness: There is no abdominal tenderness. There is no rebound. Musculoskeletal:         General: Tenderness present. Normal range of motion. Cervical back: Normal range of motion and neck supple.       Comments: Tenderness and swelling over right knee. Bilateral pedal edema. Neurological:      Mental Status: She is alert and oriented to person, place, and time. Cranial Nerves: No cranial nerve deficit. Psychiatric:         Behavior: Behavior normal.     /84 (Site: Left Lower Arm, Position: Sitting, Cuff Size: Medium Adult)   Pulse 89   Resp 22   Wt 256 lb 9.6 oz (116.4 kg)   SpO2 97%   BMI 45.45 kg/m²     Assessment:   Diagnoses and all orders for this visit:  Primary insomnia  -     zolpidem (AMBIEN) 5 MG tablet; Take 1 tablet by mouth nightly as needed for Sleep for up to 30 days. Max Daily Amount: 5 mg  Body mass index (BMI) 45.0-49.9, adult (HCC)  Malignant neoplasm of urinary bladder, unspecified site (HCC)-stable  Dyspnea, unspecified type-has upcoming cardiology appointment next week. Recommendations on echocardiogram and adjusting Lasix dose. Plan:      Return in about 6 months (around 7/6/2023) for Reg f/u . No orders of the defined types were placed in this encounter. Orders Placed This Encounter   Medications    zolpidem (AMBIEN) 5 MG tablet     Sig: Take 1 tablet by mouth nightly as needed for Sleep for up to 30 days. Max Daily Amount: 5 mg     Dispense:  30 tablet     Refill:  0         Patient given educational materials - see patient instructions. Discussed use, benefit, and side effects of prescribedmedications. All patient questions answered. Pt voiced understanding. Reviewed health maintenance. Instructed to continue current medications, diet and exercise. Patient agreed with treatment plan. Follow up as directed. Electronically signed by   Lisa Garcia MD on 1/6/2023 at 9:06 AM  [facility]. Please note that this chart was generated using voice recognition Dragon dictation software. Although every effort was made to ensure the accuracy of this automatedtranscription, some errors in transcription may have occurred.

## 2023-04-06 ENCOUNTER — HOSPITAL ENCOUNTER (OUTPATIENT)
Dept: MAMMOGRAPHY | Age: 78
Discharge: HOME | End: 2023-04-08
Payer: MEDICARE

## 2023-04-06 DIAGNOSIS — Z12.31 VISIT FOR SCREENING MAMMOGRAM: ICD-10-CM

## 2023-04-06 PROCEDURE — 77063 BREAST TOMOSYNTHESIS BI: CPT

## 2023-04-19 ENCOUNTER — TELEPHONE (OUTPATIENT)
Dept: UROLOGY | Age: 78
End: 2023-04-19

## 2023-04-19 NOTE — TELEPHONE ENCOUNTER
Attempted to contact patient for procedure scheduling. Spoke with patient, Alex Lawson would like to come in and speak with Dr. Juan Lee instead of scheduling a another cysto botox, OV was made for 5/19/23.

## 2023-04-27 ENCOUNTER — OFFICE VISIT (OUTPATIENT)
Dept: FAMILY MEDICINE CLINIC | Age: 78
End: 2023-04-27
Payer: MEDICARE

## 2023-04-27 VITALS
OXYGEN SATURATION: 97 % | DIASTOLIC BLOOD PRESSURE: 74 MMHG | SYSTOLIC BLOOD PRESSURE: 126 MMHG | RESPIRATION RATE: 16 BRPM | WEIGHT: 256 LBS | BODY MASS INDEX: 45.35 KG/M2 | HEART RATE: 86 BPM

## 2023-04-27 DIAGNOSIS — J06.9 ACUTE URI: Primary | ICD-10-CM

## 2023-04-27 PROCEDURE — G8417 CALC BMI ABV UP PARAM F/U: HCPCS | Performed by: REGISTERED NURSE

## 2023-04-27 PROCEDURE — G8427 DOCREV CUR MEDS BY ELIG CLIN: HCPCS | Performed by: REGISTERED NURSE

## 2023-04-27 PROCEDURE — 3074F SYST BP LT 130 MM HG: CPT | Performed by: REGISTERED NURSE

## 2023-04-27 PROCEDURE — 3078F DIAST BP <80 MM HG: CPT | Performed by: REGISTERED NURSE

## 2023-04-27 PROCEDURE — 1036F TOBACCO NON-USER: CPT | Performed by: REGISTERED NURSE

## 2023-04-27 PROCEDURE — 1123F ACP DISCUSS/DSCN MKR DOCD: CPT | Performed by: REGISTERED NURSE

## 2023-04-27 PROCEDURE — G8400 PT W/DXA NO RESULTS DOC: HCPCS | Performed by: REGISTERED NURSE

## 2023-04-27 PROCEDURE — 99213 OFFICE O/P EST LOW 20 MIN: CPT | Performed by: REGISTERED NURSE

## 2023-04-27 PROCEDURE — 1090F PRES/ABSN URINE INCON ASSESS: CPT | Performed by: REGISTERED NURSE

## 2023-04-27 RX ORDER — AZITHROMYCIN 250 MG/1
250 TABLET, FILM COATED ORAL SEE ADMIN INSTRUCTIONS
Qty: 6 TABLET | Refills: 0 | Status: SHIPPED | OUTPATIENT
Start: 2023-04-27 | End: 2023-05-02

## 2023-04-27 SDOH — ECONOMIC STABILITY: HOUSING INSECURITY
IN THE LAST 12 MONTHS, WAS THERE A TIME WHEN YOU DID NOT HAVE A STEADY PLACE TO SLEEP OR SLEPT IN A SHELTER (INCLUDING NOW)?: NO

## 2023-04-27 SDOH — ECONOMIC STABILITY: FOOD INSECURITY: WITHIN THE PAST 12 MONTHS, YOU WORRIED THAT YOUR FOOD WOULD RUN OUT BEFORE YOU GOT MONEY TO BUY MORE.: NEVER TRUE

## 2023-04-27 SDOH — ECONOMIC STABILITY: FOOD INSECURITY: WITHIN THE PAST 12 MONTHS, THE FOOD YOU BOUGHT JUST DIDN'T LAST AND YOU DIDN'T HAVE MONEY TO GET MORE.: NEVER TRUE

## 2023-04-27 SDOH — ECONOMIC STABILITY: INCOME INSECURITY: HOW HARD IS IT FOR YOU TO PAY FOR THE VERY BASICS LIKE FOOD, HOUSING, MEDICAL CARE, AND HEATING?: NOT HARD AT ALL

## 2023-04-27 ASSESSMENT — ENCOUNTER SYMPTOMS
SHORTNESS OF BREATH: 0
EYES NEGATIVE: 1
SORE THROAT: 0
TROUBLE SWALLOWING: 0
SINUS PRESSURE: 1
WHEEZING: 1
COUGH: 1
FACIAL SWELLING: 0

## 2023-04-27 ASSESSMENT — PATIENT HEALTH QUESTIONNAIRE - PHQ9
SUM OF ALL RESPONSES TO PHQ QUESTIONS 1-9: 0
SUM OF ALL RESPONSES TO PHQ QUESTIONS 1-9: 0
2. FEELING DOWN, DEPRESSED OR HOPELESS: 0
SUM OF ALL RESPONSES TO PHQ9 QUESTIONS 1 & 2: 0
SUM OF ALL RESPONSES TO PHQ QUESTIONS 1-9: 0
1. LITTLE INTEREST OR PLEASURE IN DOING THINGS: 0
SUM OF ALL RESPONSES TO PHQ QUESTIONS 1-9: 0

## 2023-04-27 NOTE — PROGRESS NOTES
[facility]  [address] [facility] [address]  [address] [address]  Dept: [phone]  Dept Fax: [phone]    Alex Sargent is a [de-identified] y.o. female who presents today for her medical conditions/complaints of   Chief Complaint   Patient presents with    Cough     Productive yellow/green, x 2 days    Chest Congestion    Wheezing          HPI:     /74   Pulse 86   Resp 16   Wt 256 lb (116.1 kg)   SpO2 97%   BMI 45.35 kg/m²       Cough  This is a new problem. The current episode started in the past 7 days. The problem has been gradually worsening. The cough is Productive of purulent sputum and productive of sputum. Associated symptoms include wheezing. Pertinent negatives include no chest pain, chills, ear pain, fever, sore throat or shortness of breath. Nothing aggravates the symptoms. Treatments tried: Claritin-D. There is no history of COPD. Chest Congestion  This is a new problem. The current episode started in the past 7 days. The problem occurs constantly. Associated symptoms include congestion and coughing. Pertinent negatives include no chest pain, chills, fever or sore throat. Wheezing   This is a new problem. The current episode started in the past 7 days. Associated symptoms include coughing. Pertinent negatives include no chest pain, chills, ear pain, fever, shortness of breath or sore throat. There is no history of COPD. Past Medical History:   Diagnosis Date    AK (actinic keratosis)     from sun exposure    Anesthesia     HARD TIME WAKING UP \"SOMETIMES\".     Arthritis     Asymptomatic bilateral carotid artery stenosis 03/10/2015    Atherosclerosis of native coronary artery of native heart without angina pectoris 2017    Bladder cancer ([address]     bladder, first time     CAD (coronary artery disease)     x3 stents    Colon polyp     Dr. Ruben leal present     Diarrhea     Difficult

## 2023-05-01 ENCOUNTER — HOSPITAL ENCOUNTER (OUTPATIENT)
Dept: GENERAL RADIOLOGY | Age: 78
Discharge: HOME | End: 2023-05-03
Payer: MEDICARE

## 2023-05-01 ENCOUNTER — OFFICE VISIT (OUTPATIENT)
Dept: FAMILY MEDICINE CLINIC | Age: 78
End: 2023-05-01
Payer: MEDICARE

## 2023-05-01 ENCOUNTER — TELEPHONE (OUTPATIENT)
Dept: PRIMARY CARE CLINIC | Age: 78
End: 2023-05-01

## 2023-05-01 ENCOUNTER — HOSPITAL ENCOUNTER (OUTPATIENT)
Age: 78
Discharge: HOME | End: 2023-05-03
Payer: MEDICARE

## 2023-05-01 VITALS
DIASTOLIC BLOOD PRESSURE: 80 MMHG | BODY MASS INDEX: 44.3 KG/M2 | HEART RATE: 79 BPM | HEIGHT: 63 IN | WEIGHT: 250 LBS | SYSTOLIC BLOOD PRESSURE: 130 MMHG | TEMPERATURE: 97.2 F | RESPIRATION RATE: 15 BRPM | OXYGEN SATURATION: 94 %

## 2023-05-01 DIAGNOSIS — R06.2 WHEEZING: ICD-10-CM

## 2023-05-01 DIAGNOSIS — J40 BRONCHITIS: Primary | ICD-10-CM

## 2023-05-01 DIAGNOSIS — J40 BRONCHITIS: ICD-10-CM

## 2023-05-01 PROCEDURE — G8427 DOCREV CUR MEDS BY ELIG CLIN: HCPCS | Performed by: NURSE PRACTITIONER

## 2023-05-01 PROCEDURE — 1036F TOBACCO NON-USER: CPT | Performed by: NURSE PRACTITIONER

## 2023-05-01 PROCEDURE — G8400 PT W/DXA NO RESULTS DOC: HCPCS | Performed by: NURSE PRACTITIONER

## 2023-05-01 PROCEDURE — 3075F SYST BP GE 130 - 139MM HG: CPT | Performed by: NURSE PRACTITIONER

## 2023-05-01 PROCEDURE — G8417 CALC BMI ABV UP PARAM F/U: HCPCS | Performed by: NURSE PRACTITIONER

## 2023-05-01 PROCEDURE — 71046 X-RAY EXAM CHEST 2 VIEWS: CPT

## 2023-05-01 PROCEDURE — 3079F DIAST BP 80-89 MM HG: CPT | Performed by: NURSE PRACTITIONER

## 2023-05-01 PROCEDURE — 99214 OFFICE O/P EST MOD 30 MIN: CPT | Performed by: NURSE PRACTITIONER

## 2023-05-01 PROCEDURE — 1090F PRES/ABSN URINE INCON ASSESS: CPT | Performed by: NURSE PRACTITIONER

## 2023-05-01 PROCEDURE — 1123F ACP DISCUSS/DSCN MKR DOCD: CPT | Performed by: NURSE PRACTITIONER

## 2023-05-01 RX ORDER — PREDNISONE 20 MG/1
20 TABLET ORAL 2 TIMES DAILY
Qty: 10 TABLET | Refills: 0 | Status: SHIPPED | OUTPATIENT
Start: 2023-05-01 | End: 2023-05-06

## 2023-05-01 RX ORDER — METOPROLOL TARTRATE 50 MG/1
TABLET, FILM COATED ORAL
COMMUNITY
Start: 2023-02-02

## 2023-05-01 RX ORDER — FUROSEMIDE 40 MG/1
TABLET ORAL
COMMUNITY
Start: 2023-04-10

## 2023-05-01 RX ORDER — DOXYCYCLINE HYCLATE 100 MG
100 TABLET ORAL 2 TIMES DAILY
Qty: 20 TABLET | Refills: 0 | Status: SHIPPED | OUTPATIENT
Start: 2023-05-01

## 2023-05-01 RX ORDER — CLOPIDOGREL BISULFATE 75 MG/1
75 TABLET ORAL DAILY
COMMUNITY
Start: 2023-03-07

## 2023-05-01 RX ORDER — BENZONATATE 200 MG/1
200 CAPSULE ORAL 3 TIMES DAILY PRN
Qty: 30 CAPSULE | Refills: 0 | Status: SHIPPED | OUTPATIENT
Start: 2023-05-01 | End: 2023-05-11

## 2023-05-01 RX ORDER — POTASSIUM CHLORIDE 20 MEQ/1
20 TABLET, EXTENDED RELEASE ORAL DAILY
COMMUNITY
Start: 2023-01-12

## 2023-05-01 ASSESSMENT — ENCOUNTER SYMPTOMS
NAUSEA: 0
CHEST TIGHTNESS: 1
VOMITING: 0
COUGH: 1
EYE PAIN: 0
RHINORRHEA: 0
SHORTNESS OF BREATH: 0
SORE THROAT: 0
WHEEZING: 1

## 2023-05-01 NOTE — TELEPHONE ENCOUNTER
Patient came to walk in on Thursday and her Z-Marcos has not helped. She saw riya Escobedo in provider. She is still experiencing shortness of breath, wheezing, and loss of appetite. She is concerned she may have pneumonia. She is supposed to travel by plane on Thursday and wants to treat this aggressively so she can travel. Please advise.

## 2023-05-01 NOTE — PROGRESS NOTES
[address] [facility]  [address] [facility] [address]  [address] [address]  Dept: [phone]  Dept Fax: [phone]    Bertram Andrea is a [de-identified] y.o. female who presents today for her medical conditions/complaints of   Chief Complaint   Patient presents with    Shortness of Breath     SX from last visit have worsened z pack did not help          HPI:     /80 (Site: Right Lower Arm, Position: Sitting, Cuff Size: Medium Adult)   Pulse 79   Temp 97.2 °F (36.2 °C)   Resp 15   Ht 5' 3\" (1.6 m)   Wt 250 lb (113.4 kg)   SpO2 94%   BMI 44.29 kg/m²       HPI  Pt presented to the clinic today with c/o cough- productive with yellow sputum. This is an ongoing problem. The current episode started 7 days ago. Was seen here on  and treated with Z-pack for URI. Associated symptoms include: congestion, wheezing, chest tightness . Pertinent negatives include: No fever, chills, chest pain, abdominal pain. No history of asthma, allergies or pneumonia. Past Medical History:   Diagnosis Date    AK (actinic keratosis)     from sun exposure    Anesthesia     HARD TIME WAKING UP \"SOMETIMES\". Arthritis     Asymptomatic bilateral carotid artery stenosis 03/10/2015    Atherosclerosis of native coronary artery of native heart without angina pectoris 2017    Bladder cancer ([facility] [address]     bladder, first time     CAD (coronary artery disease)     x3 stents    Colon polyp     Dr. Jose D leal present     Diarrhea     Difficult intravenous access     Difficult intubation     SMALL MOUTH AND AIRWAY    Fall     in bath tub 21, left knee warm and bruised, BRUISED TO CHEEKS AND NOSE, ARMS, KNEES AND LEFT THIGHS. Sailaja Reinier     GERD (gastroesophageal reflux disease)     Head injury 2021    Per [facility] chart- \"closed head injury, fell in the bath tub\"    History of atrial fibrillation     \"Very seldom\" per patient    Hypertension

## 2023-05-19 ENCOUNTER — OFFICE VISIT (OUTPATIENT)
Dept: UROLOGY | Age: 78
End: 2023-05-19
Payer: MEDICARE

## 2023-05-19 VITALS
BODY MASS INDEX: 44.3 KG/M2 | HEIGHT: 63 IN | TEMPERATURE: 97 F | WEIGHT: 250 LBS | HEART RATE: 80 BPM | SYSTOLIC BLOOD PRESSURE: 124 MMHG | DIASTOLIC BLOOD PRESSURE: 82 MMHG

## 2023-05-19 DIAGNOSIS — R35.1 NOCTURIA: ICD-10-CM

## 2023-05-19 DIAGNOSIS — Z85.51 HISTORY OF BLADDER CANCER: ICD-10-CM

## 2023-05-19 DIAGNOSIS — R39.15 URGENCY OF URINATION: Primary | ICD-10-CM

## 2023-05-19 PROCEDURE — 3074F SYST BP LT 130 MM HG: CPT | Performed by: UROLOGY

## 2023-05-19 PROCEDURE — 1090F PRES/ABSN URINE INCON ASSESS: CPT | Performed by: UROLOGY

## 2023-05-19 PROCEDURE — 1123F ACP DISCUSS/DSCN MKR DOCD: CPT | Performed by: UROLOGY

## 2023-05-19 PROCEDURE — G8400 PT W/DXA NO RESULTS DOC: HCPCS | Performed by: UROLOGY

## 2023-05-19 PROCEDURE — 1036F TOBACCO NON-USER: CPT | Performed by: UROLOGY

## 2023-05-19 PROCEDURE — G8427 DOCREV CUR MEDS BY ELIG CLIN: HCPCS | Performed by: UROLOGY

## 2023-05-19 PROCEDURE — 3079F DIAST BP 80-89 MM HG: CPT | Performed by: UROLOGY

## 2023-05-19 PROCEDURE — 99214 OFFICE O/P EST MOD 30 MIN: CPT | Performed by: UROLOGY

## 2023-05-19 PROCEDURE — G8417 CALC BMI ABV UP PARAM F/U: HCPCS | Performed by: UROLOGY

## 2023-05-19 ASSESSMENT — ENCOUNTER SYMPTOMS
VOMITING: 0
EYE REDNESS: 0
GASTROINTESTINAL NEGATIVE: 1
RESPIRATORY NEGATIVE: 1
SHORTNESS OF BREATH: 0
ABDOMINAL PAIN: 0
WHEEZING: 0
EYE PAIN: 0
CONSTIPATION: 0
NAUSEA: 0
COUGH: 0
BACK PAIN: 0
EYES NEGATIVE: 1
DIARRHEA: 0

## 2023-05-19 NOTE — PROGRESS NOTES
Review of Systems   Constitutional: Negative. Negative for appetite change, chills and fever. Eyes: Negative. Negative for pain, redness and visual disturbance. Respiratory: Negative. Negative for cough, shortness of breath and wheezing. Cardiovascular: Negative. Negative for chest pain and leg swelling. Gastrointestinal: Negative. Negative for abdominal pain, constipation, diarrhea, nausea and vomiting. Genitourinary:  Positive for frequency. Negative for difficulty urinating, dysuria, flank pain, hematuria and urgency. Musculoskeletal: Negative. Negative for back pain, joint swelling and myalgias. Skin: Negative. Negative for rash and wound. Neurological: Negative. Negative for dizziness, tremors and numbness. Hematological: Negative. Does not bruise/bleed easily.
NOSE, ARMS, KNEES AND LEFT THIGHS. [facility] GERD (gastroesophageal reflux disease)     Head injury 11/24/2021    Per [facility] chart- \"closed head injury, fell in the bath tub\"    History of atrial fibrillation     \"Very seldom\" per patient    Hypertension     Hypothyroid     Obesity     DAISY (obstructive sleep apnea)     NO MACHINE, she has bad sinus issues and requested alternatives instead of a machine, nothing was done    PAC (premature atrial contraction)     occasionally    Prolonged emergence from general anesthesia     Stress incontinence     Syncope     Per [facility] chart    Wears glasses     FOR READING. Past Surgical History:   Procedure Laterality Date    BLADDER SURGERY  8/1993 and 10/2002    For bladder cancer treatment (patient states through cystoscopies)    BREAST BIOPSY      LT. BREAST (BENIGN). , large mass removed    CHOLECYSTECTOMY      COLONOSCOPY      3-4X    COLONOSCOPY  07/19/2019    Procedure: COLONOSCOPY AND POLYPECTOMY; Surgeon: Joleen Herrera MD; Location: [facility] ENDOSCOPY; Service: Gastroenterology; Laterality: N/A;    CORONARY ANGIOPLASTY  07/03/2014    3 stents inserted via heart cath    CYST REMOVAL Right     3RD FINGER. CYSTOSCOPY  2014    Dr Andrea Barton  11/17/2015    TURB-bladder tumors removed    CYSTOSCOPY  11/18/2016    fulgeration with biopsies    CYSTOSCOPY N/A 01/21/2020    CYSTO BOTOX INJECTION 100UNITS performed by Cristian Koch MD at [address] N/A 11/17/2020    CYSTOSCOPY INJECTION BOTOX 100 UNITS performed by Cristian Koch MD at [address] N/A 12/10/2021    CYSTOSCOPY BOTOX 100 UNITS performed by Cristian Koch MD at [address] N/A 11/11/2022    CYSTOSCOPY INJECTION 150 UNITS BOTOX performed by Cristian Koch MD at [address]    ESOPHAGOGASTRODUODENOSCOPY  07/19/2019    Procedure: EGD with BX; Surgeon: Joleen Herrera MD; Location: [facility] ENDOSCOPY; Service: Gastroenterology;  Laterality: N/A;

## 2023-05-24 ENCOUNTER — TELEPHONE (OUTPATIENT)
Dept: UROLOGY | Age: 78
End: 2023-05-24

## 2023-05-24 NOTE — TELEPHONE ENCOUNTER
Kane botox 200 units @ [facility] 6/29/23 **STOP BLOOD THINNERS 6/22/23**   PAT @ [address] [place] [address] 6/14/23 11:30am         Spoke with patient, procedure info emailed.

## 2023-06-13 NOTE — H&P
Date Noted    Primary osteoarthritis of both knees 10/18/2022    Status post fall 01/05/2022    Primary osteoarthritis of right knee 05/17/2020    Actinic keratosis 02/12/2020    Polyp of colon 02/12/2020    Gastroesophageal reflux disease 02/12/2020    Helton's esophagus without dysplasia 10/22/2019    Left ventricular hypertrophy 02/21/2019    Dyslipidemia 05/07/2018    Atherosclerosis of native coronary artery of native heart without angina pectoris 05/23/2017    Asymptomatic bilateral carotid artery stenosis 03/10/2015    Bladder cancer ([facility] [address] 08/25/2014    Malignant tumor of urinary bladder ([facility] [address] 08/25/2014    DDD (degenerative disc disease), lumbar 03/04/2014    DAISY (obstructive sleep apnea)     Hypothyroid     Irritable bowel syndrome     Obesity     Hypertension        CLEARANCE:     Based on my personal evaluation of patient including review of patient's chart, no clearance required for scheduled surgery . Pt denies any acute problems. Pt has elevated BP, she states that she didn't take her BP yet today and also has white coat. Patient states that she will be seeing her new PCP in July. Will forward any  abnormal labs to pcp.      DARA BRADFORD, APRN - CNP on 6/14/2023 at 1:50 PM    Total time spent on encounter- PAT provider minutes: 21-30 minutes    Note marked for cosign by provider
68.9

## 2023-06-14 ENCOUNTER — ANESTHESIA EVENT (OUTPATIENT)
Dept: OPERATING ROOM | Age: 78
End: 2023-06-14
Payer: MEDICARE

## 2023-06-14 ENCOUNTER — HOSPITAL ENCOUNTER (OUTPATIENT)
Dept: PREADMISSION TESTING | Age: 78
Discharge: HOME | End: 2023-06-18
Attending: UROLOGY | Admitting: UROLOGY
Payer: MEDICARE

## 2023-06-14 VITALS
TEMPERATURE: 98.2 F | RESPIRATION RATE: 18 BRPM | OXYGEN SATURATION: 98 % | DIASTOLIC BLOOD PRESSURE: 93 MMHG | SYSTOLIC BLOOD PRESSURE: 150 MMHG | BODY MASS INDEX: 44.3 KG/M2 | WEIGHT: 250 LBS | HEIGHT: 63 IN | HEART RATE: 72 BPM

## 2023-06-14 LAB
ANION GAP SERPL CALCULATED.3IONS-SCNC: 7 MMOL/L (ref 9–17)
BASOPHILS # BLD: 0 K/UL (ref 0–0.2)
BASOPHILS NFR BLD: 0 % (ref 0–2)
BUN SERPL-MCNC: 26 MG/DL (ref 8–23)
CALCIUM SERPL-MCNC: 9.6 MG/DL (ref 8.6–10.4)
CHLORIDE SERPL-SCNC: 103 MMOL/L (ref 98–107)
CO2 SERPL-SCNC: 28 MMOL/L (ref 20–31)
CREAT SERPL-MCNC: 0.89 MG/DL (ref 0.5–0.9)
EOSINOPHIL # BLD: 0.2 K/UL (ref 0–0.4)
EOSINOPHILS RELATIVE PERCENT: 2 % (ref 0–4)
ERYTHROCYTE [DISTWIDTH] IN BLOOD BY AUTOMATED COUNT: 14.3 % (ref 11.5–14.9)
GFR SERPL CREATININE-BSD FRML MDRD: >60 ML/MIN/1.73M2
GLUCOSE SERPL-MCNC: 87 MG/DL (ref 70–99)
HCT VFR BLD AUTO: 43.1 % (ref 36–46)
HGB BLD-MCNC: 14 G/DL (ref 12–16)
LYMPHOCYTES # BLD: 17 % (ref 24–44)
LYMPHOCYTES NFR BLD: 1.6 K/UL (ref 1–4.8)
MCH RBC QN AUTO: 29.3 PG (ref 26–34)
MCHC RBC AUTO-ENTMCNC: 32.6 G/DL (ref 31–37)
MCV RBC AUTO: 89.7 FL (ref 80–100)
MONOCYTES NFR BLD: 1.1 K/UL (ref 0.1–1.3)
MONOCYTES NFR BLD: 12 % (ref 1–7)
NEUTROPHILS NFR BLD: 69 % (ref 36–66)
NEUTS SEG NFR BLD: 6.8 K/UL (ref 1.3–9.1)
PLATELET # BLD AUTO: 253 K/UL (ref 150–450)
PMV BLD AUTO: 7.7 FL (ref 6–12)
POTASSIUM SERPL-SCNC: 5.4 MMOL/L (ref 3.7–5.3)
RBC # BLD AUTO: 4.8 M/UL (ref 4–5.2)
SODIUM SERPL-SCNC: 138 MMOL/L (ref 135–144)
WBC OTHER # BLD: 9.7 K/UL (ref 3.5–11)

## 2023-06-14 PROCEDURE — 80048 BASIC METABOLIC PNL TOTAL CA: CPT

## 2023-06-14 PROCEDURE — 36415 COLL VENOUS BLD VENIPUNCTURE: CPT

## 2023-06-14 PROCEDURE — 87088 URINE BACTERIA CULTURE: CPT

## 2023-06-14 PROCEDURE — APPSS45 APP SPLIT SHARED TIME 31-45 MINUTES: Performed by: NURSE PRACTITIONER

## 2023-06-14 PROCEDURE — 87086 URINE CULTURE/COLONY COUNT: CPT

## 2023-06-14 PROCEDURE — 87186 SC STD MICRODIL/AGAR DIL: CPT

## 2023-06-14 PROCEDURE — 93005 ELECTROCARDIOGRAM TRACING: CPT | Performed by: ANESTHESIOLOGY

## 2023-06-14 PROCEDURE — 85027 COMPLETE CBC AUTOMATED: CPT

## 2023-06-14 NOTE — DISCHARGE INSTRUCTIONS
Pre-op Instructions For Out-Patient Surgery    Medication Instructions:  Please stop herbs and any supplements now (includes vitamins and minerals). Please contact your surgeon and prescribing physician for pre-op instructions for any blood thinners. Stop Plavix & Aspirin as directed    If you have inhalers/aerosol treatments at home, please use them the morning of your surgery and bring the inhalers with you to the hospital.    Please take the following medications the morning of your surgery with a sip of water:    Losartan, Metoprolol, Levothyroxine, Pantoprazole    Surgery Instructions:  After midnight before surgery:  Do not eat or drink anything, including water, mints, gum, and hard candy. You may brush your teeth without swallowing. No smoking, chewing tobacco, or street drugs. Please shower or bathe before surgery. If you were given Surgical Scrub Chlorhexidine Gluconate Liquid (CHG), please shower the night before and the morning of your surgery following the detailed instructions you received during your pre-admission visit. Please do not wear any cologne, lotion, powder, deodorant, jewelry, piercings, perfume, makeup, nail polish, hair accessories, or hair spray on the day of surgery. Wear loose comfortable clothing. Leave your valuables at home. Bring a storage case for any glasses/contacts. An adult who is responsible for you MUST drive you home and should be with you for the first 24 hours after surgery. If having out-patient knee and foot surgeries, please arrange for planned crutches, walker, or wheelchair before arriving to the hospital.    The Day of Surgery:  Arrive at [facility] AT [facility] Surgery Entrance at the time directed by your surgeon and check in at the desk. If you have a living will or healthcare power of , please bring a copy. You will be taken to the pre-op holding area where you will be prepared for surgery.   A

## 2023-06-15 LAB
EKG ATRIAL RATE: 66 BPM
EKG P AXIS: 47 DEGREES
EKG P-R INTERVAL: 174 MS
EKG Q-T INTERVAL: 392 MS
EKG QRS DURATION: 66 MS
EKG QTC CALCULATION (BAZETT): 410 MS
EKG R AXIS: 26 DEGREES
EKG T AXIS: 44 DEGREES
EKG VENTRICULAR RATE: 66 BPM

## 2023-06-15 PROCEDURE — 93010 ELECTROCARDIOGRAM REPORT: CPT | Performed by: INTERNAL MEDICINE

## 2023-06-16 LAB
MICROORGANISM SPEC CULT: ABNORMAL
SPECIMEN DESCRIPTION: ABNORMAL

## 2023-06-19 ENCOUNTER — TELEPHONE (OUTPATIENT)
Dept: PREADMISSION TESTING | Age: 78
End: 2023-06-19

## 2023-06-20 RX ORDER — SULFAMETHOXAZOLE AND TRIMETHOPRIM 800; 160 MG/1; MG/1
1 TABLET ORAL 2 TIMES DAILY
Qty: 14 TABLET | Refills: 0 | Status: SHIPPED | OUTPATIENT
Start: 2023-06-20 | End: 2023-06-27

## 2023-06-29 ENCOUNTER — HOSPITAL ENCOUNTER (OUTPATIENT)
Age: 78
Setting detail: OUTPATIENT SURGERY
Discharge: HOME | End: 2023-06-29
Attending: UROLOGY | Admitting: UROLOGY
Payer: MEDICARE

## 2023-06-29 ENCOUNTER — ANESTHESIA (OUTPATIENT)
Dept: OPERATING ROOM | Age: 78
End: 2023-06-29
Payer: MEDICARE

## 2023-06-29 VITALS
OXYGEN SATURATION: 96 % | BODY MASS INDEX: 44.3 KG/M2 | SYSTOLIC BLOOD PRESSURE: 159 MMHG | TEMPERATURE: 97.6 F | DIASTOLIC BLOOD PRESSURE: 72 MMHG | RESPIRATION RATE: 16 BRPM | WEIGHT: 250 LBS | HEIGHT: 63 IN | HEART RATE: 64 BPM

## 2023-06-29 LAB — POTASSIUM SERPL-SCNC: 4.9 MMOL/L (ref 3.7–5.3)

## 2023-06-29 PROCEDURE — 6360000002 HC RX W HCPCS: Performed by: UROLOGY

## 2023-06-29 PROCEDURE — 2709999900 HC NON-CHARGEABLE SUPPLY: Performed by: UROLOGY

## 2023-06-29 PROCEDURE — 3700000000 HC ANESTHESIA ATTENDED CARE: Performed by: UROLOGY

## 2023-06-29 PROCEDURE — 3600000012 HC SURGERY LEVEL 2 ADDTL 15MIN: Performed by: UROLOGY

## 2023-06-29 PROCEDURE — 2580000003 HC RX 258: Performed by: UROLOGY

## 2023-06-29 PROCEDURE — 7100000000 HC PACU RECOVERY - FIRST 15 MIN: Performed by: UROLOGY

## 2023-06-29 PROCEDURE — 2500000003 HC RX 250 WO HCPCS: Performed by: ANESTHESIOLOGY

## 2023-06-29 PROCEDURE — 3700000001 HC ADD 15 MINUTES (ANESTHESIA): Performed by: UROLOGY

## 2023-06-29 PROCEDURE — 7100000001 HC PACU RECOVERY - ADDTL 15 MIN: Performed by: UROLOGY

## 2023-06-29 PROCEDURE — 3600000002 HC SURGERY LEVEL 2 BASE: Performed by: UROLOGY

## 2023-06-29 PROCEDURE — 36415 COLL VENOUS BLD VENIPUNCTURE: CPT

## 2023-06-29 PROCEDURE — 7100000030 HC ASPR PHASE II RECOVERY - FIRST 15 MIN: Performed by: UROLOGY

## 2023-06-29 PROCEDURE — 2500000003 HC RX 250 WO HCPCS: Performed by: NURSE ANESTHETIST, CERTIFIED REGISTERED

## 2023-06-29 PROCEDURE — 84132 ASSAY OF SERUM POTASSIUM: CPT

## 2023-06-29 PROCEDURE — 6360000002 HC RX W HCPCS: Performed by: NURSE ANESTHETIST, CERTIFIED REGISTERED

## 2023-06-29 PROCEDURE — 6370000000 HC RX 637 (ALT 250 FOR IP): Performed by: UROLOGY

## 2023-06-29 PROCEDURE — 7100000011 HC PHASE II RECOVERY - ADDTL 15 MIN: Performed by: UROLOGY

## 2023-06-29 PROCEDURE — 2580000003 HC RX 258: Performed by: ANESTHESIOLOGY

## 2023-06-29 PROCEDURE — A4216 STERILE WATER/SALINE, 10 ML: HCPCS | Performed by: UROLOGY

## 2023-06-29 PROCEDURE — 7100000010 HC PHASE II RECOVERY - FIRST 15 MIN: Performed by: UROLOGY

## 2023-06-29 PROCEDURE — 7100000031 HC ASPR PHASE II RECOVERY - ADDTL 15 MIN: Performed by: UROLOGY

## 2023-06-29 RX ORDER — LIDOCAINE HYDROCHLORIDE 20 MG/ML
INJECTION, SOLUTION EPIDURAL; INFILTRATION; INTRACAUDAL; PERINEURAL PRN
Status: DISCONTINUED | OUTPATIENT
Start: 2023-06-29 | End: 2023-06-29 | Stop reason: SDUPTHER

## 2023-06-29 RX ORDER — PROPOFOL 10 MG/ML
INJECTION, EMULSION INTRAVENOUS CONTINUOUS PRN
Status: DISCONTINUED | OUTPATIENT
Start: 2023-06-29 | End: 2023-06-29 | Stop reason: SDUPTHER

## 2023-06-29 RX ORDER — SODIUM CHLORIDE 0.9 % (FLUSH) 0.9 %
5-40 SYRINGE (ML) INJECTION EVERY 12 HOURS SCHEDULED
Status: DISCONTINUED | OUTPATIENT
Start: 2023-06-29 | End: 2023-06-29 | Stop reason: HOSPADM

## 2023-06-29 RX ORDER — LIDOCAINE HYDROCHLORIDE 20 MG/ML
JELLY TOPICAL PRN
Status: DISCONTINUED | OUTPATIENT
Start: 2023-06-29 | End: 2023-06-29 | Stop reason: ALTCHOICE

## 2023-06-29 RX ORDER — SODIUM CHLORIDE 9 MG/ML
INJECTION INTRAVENOUS PRN
Status: DISCONTINUED | OUTPATIENT
Start: 2023-06-29 | End: 2023-06-29 | Stop reason: ALTCHOICE

## 2023-06-29 RX ORDER — SODIUM CHLORIDE 0.9 % (FLUSH) 0.9 %
5-40 SYRINGE (ML) INJECTION PRN
Status: DISCONTINUED | OUTPATIENT
Start: 2023-06-29 | End: 2023-06-29 | Stop reason: HOSPADM

## 2023-06-29 RX ORDER — NITROFURANTOIN 25; 75 MG/1; MG/1
100 CAPSULE ORAL 2 TIMES DAILY
Qty: 10 CAPSULE | Refills: 0 | Status: SHIPPED | OUTPATIENT
Start: 2023-06-29 | End: 2023-07-04

## 2023-06-29 RX ORDER — SODIUM CHLORIDE, SODIUM LACTATE, POTASSIUM CHLORIDE, CALCIUM CHLORIDE 600; 310; 30; 20 MG/100ML; MG/100ML; MG/100ML; MG/100ML
INJECTION, SOLUTION INTRAVENOUS CONTINUOUS
Status: DISCONTINUED | OUTPATIENT
Start: 2023-06-29 | End: 2023-06-29 | Stop reason: HOSPADM

## 2023-06-29 RX ORDER — LIDOCAINE HYDROCHLORIDE 10 MG/ML
1 INJECTION, SOLUTION EPIDURAL; INFILTRATION; INTRACAUDAL; PERINEURAL
Status: COMPLETED | OUTPATIENT
Start: 2023-06-29 | End: 2023-06-29

## 2023-06-29 RX ORDER — SODIUM CHLORIDE 9 MG/ML
INJECTION, SOLUTION INTRAVENOUS PRN
Status: DISCONTINUED | OUTPATIENT
Start: 2023-06-29 | End: 2023-06-29 | Stop reason: HOSPADM

## 2023-06-29 RX ADMIN — Medication 3000 MG: at 09:03

## 2023-06-29 RX ADMIN — SODIUM CHLORIDE, POTASSIUM CHLORIDE, SODIUM LACTATE AND CALCIUM CHLORIDE: 600; 310; 30; 20 INJECTION, SOLUTION INTRAVENOUS at 07:40

## 2023-06-29 RX ADMIN — LIDOCAINE HYDROCHLORIDE 80 MG: 20 INJECTION, SOLUTION EPIDURAL; INFILTRATION; INTRACAUDAL; PERINEURAL at 09:11

## 2023-06-29 RX ADMIN — LIDOCAINE HYDROCHLORIDE 1 ML: 10 INJECTION, SOLUTION EPIDURAL; INFILTRATION; INTRACAUDAL; PERINEURAL at 07:40

## 2023-06-29 RX ADMIN — PROPOFOL 100 MCG/KG/MIN: 10 INJECTION, EMULSION INTRAVENOUS at 09:11

## 2023-06-29 ASSESSMENT — PAIN - FUNCTIONAL ASSESSMENT: PAIN_FUNCTIONAL_ASSESSMENT: 0-10

## 2023-07-10 ENCOUNTER — OFFICE VISIT (OUTPATIENT)
Dept: PRIMARY CARE CLINIC | Age: 78
End: 2023-07-10
Payer: MEDICARE

## 2023-07-10 VITALS
OXYGEN SATURATION: 97 % | RESPIRATION RATE: 13 BRPM | HEART RATE: 69 BPM | HEIGHT: 64 IN | WEIGHT: 251.8 LBS | BODY MASS INDEX: 42.99 KG/M2 | SYSTOLIC BLOOD PRESSURE: 132 MMHG | DIASTOLIC BLOOD PRESSURE: 78 MMHG

## 2023-07-10 DIAGNOSIS — F51.01 PRIMARY INSOMNIA: ICD-10-CM

## 2023-07-10 DIAGNOSIS — E83.42 HYPOMAGNESEMIA: ICD-10-CM

## 2023-07-10 DIAGNOSIS — R73.09 ELEVATED GLUCOSE: ICD-10-CM

## 2023-07-10 DIAGNOSIS — C67.9 MALIGNANT NEOPLASM OF URINARY BLADDER, UNSPECIFIED SITE (HCC): ICD-10-CM

## 2023-07-10 DIAGNOSIS — E55.9 VITAMIN D DEFICIENCY: ICD-10-CM

## 2023-07-10 DIAGNOSIS — I25.10 ATHEROSCLEROSIS OF NATIVE CORONARY ARTERY OF NATIVE HEART WITHOUT ANGINA PECTORIS: ICD-10-CM

## 2023-07-10 DIAGNOSIS — E78.5 DYSLIPIDEMIA: ICD-10-CM

## 2023-07-10 DIAGNOSIS — E03.9 HYPOTHYROIDISM, UNSPECIFIED TYPE: Primary | ICD-10-CM

## 2023-07-10 PROCEDURE — G8417 CALC BMI ABV UP PARAM F/U: HCPCS | Performed by: NURSE PRACTITIONER

## 2023-07-10 PROCEDURE — 1036F TOBACCO NON-USER: CPT | Performed by: NURSE PRACTITIONER

## 2023-07-10 PROCEDURE — 3075F SYST BP GE 130 - 139MM HG: CPT | Performed by: NURSE PRACTITIONER

## 2023-07-10 PROCEDURE — 1123F ACP DISCUSS/DSCN MKR DOCD: CPT | Performed by: NURSE PRACTITIONER

## 2023-07-10 PROCEDURE — G8427 DOCREV CUR MEDS BY ELIG CLIN: HCPCS | Performed by: NURSE PRACTITIONER

## 2023-07-10 PROCEDURE — 1090F PRES/ABSN URINE INCON ASSESS: CPT | Performed by: NURSE PRACTITIONER

## 2023-07-10 PROCEDURE — G8400 PT W/DXA NO RESULTS DOC: HCPCS | Performed by: NURSE PRACTITIONER

## 2023-07-10 PROCEDURE — 3078F DIAST BP <80 MM HG: CPT | Performed by: NURSE PRACTITIONER

## 2023-07-10 PROCEDURE — 99204 OFFICE O/P NEW MOD 45 MIN: CPT | Performed by: NURSE PRACTITIONER

## 2023-07-10 RX ORDER — ISOSORBIDE MONONITRATE 30 MG/1
30 TABLET, EXTENDED RELEASE ORAL DAILY
COMMUNITY
End: 2023-07-10

## 2023-07-10 RX ORDER — CLOPIDOGREL BISULFATE 75 MG
75 TABLET ORAL DAILY
Qty: 30 TABLET | Refills: 3 | Status: SHIPPED | OUTPATIENT
Start: 2023-07-10

## 2023-07-10 RX ORDER — PANTOPRAZOLE SODIUM 40 MG/1
40 TABLET, DELAYED RELEASE ORAL DAILY
Qty: 30 TABLET | Refills: 5 | Status: SHIPPED | OUTPATIENT
Start: 2023-07-10 | End: 2023-07-13 | Stop reason: SDUPTHER

## 2023-07-10 SDOH — ECONOMIC STABILITY: FOOD INSECURITY: WITHIN THE PAST 12 MONTHS, THE FOOD YOU BOUGHT JUST DIDN'T LAST AND YOU DIDN'T HAVE MONEY TO GET MORE.: NEVER TRUE

## 2023-07-10 SDOH — ECONOMIC STABILITY: INCOME INSECURITY: HOW HARD IS IT FOR YOU TO PAY FOR THE VERY BASICS LIKE FOOD, HOUSING, MEDICAL CARE, AND HEATING?: NOT HARD AT ALL

## 2023-07-10 SDOH — ECONOMIC STABILITY: FOOD INSECURITY: WITHIN THE PAST 12 MONTHS, YOU WORRIED THAT YOUR FOOD WOULD RUN OUT BEFORE YOU GOT MONEY TO BUY MORE.: NEVER TRUE

## 2023-07-10 ASSESSMENT — PATIENT HEALTH QUESTIONNAIRE - PHQ9
SUM OF ALL RESPONSES TO PHQ QUESTIONS 1-9: 0
SUM OF ALL RESPONSES TO PHQ QUESTIONS 1-9: 0
SUM OF ALL RESPONSES TO PHQ9 QUESTIONS 1 & 2: 0
SUM OF ALL RESPONSES TO PHQ QUESTIONS 1-9: 0
1. LITTLE INTEREST OR PLEASURE IN DOING THINGS: 0
SUM OF ALL RESPONSES TO PHQ QUESTIONS 1-9: 0
2. FEELING DOWN, DEPRESSED OR HOPELESS: 0

## 2023-07-10 ASSESSMENT — ENCOUNTER SYMPTOMS
ABDOMINAL PAIN: 0
SHORTNESS OF BREATH: 0
DIARRHEA: 1
COUGH: 0
BACK PAIN: 0

## 2023-07-10 NOTE — PROGRESS NOTES
[address]  PRIMARY CARE  [address]  [place] [address]  Dept: [phone]  Dept Fax: [phone]    Armando Borges is a 78 y.o. female who presentstoday for her medical conditions/complaints as noted below.   Armando Borges is c/o of  Chief Complaint   Patient presents with    Establish Care     NTP    Discuss Medications     Feels as if she is on to many medication            HPI:     Presents for new patient visit/est care with   Previous PCP Dr. Tamie Fay  BP well controlled  Has lost 5lb since LOV  Will be working on diet/exercise    C/o knee pain and right shoulder pain, currently undergoing PT  Will become extremely sweaty with physical activity  Advised to update all labs including TSH  She is not currently taking levothyroxine    [facility]s with [facility]  Needs to schedule follow up appt  Hx of 3 stents placed 2014  Has not taken plavix d/t side effects of GI upset  Has reviewed this with cardiology  Encouraged her to take medication- willing to do so if MIRIAM  Sent rx    Protonix generic is not controlling GERD  Will change rx to MIRIAM    Following with Dr. Jaimee Limon for urology  Cytoscopy done 6/29/23  Has recheck in 6 months    Bilateral lower extremity swelling that comes/goes  Using lasix prn  Has been holding potassium d/t elevation on pre op testing  Will update labs    Denies any other problems/concerns      Hemoglobin A1C (%)   Date Value   12/14/2020 5.7             ( goal A1C is < 7)   No results found for: LABMICR  LDL Cholesterol (mg/dL)   Date Value   07/06/2022 61   12/14/2020 117     LDL Calculated (mg/dL)   Date Value   01/13/2017 129   10/06/2015 83   08/20/2014 66       (goal LDL is <100)   BUN (mg/dL)   Date Value   06/14/2023 26 (H)     BP Readings from Last 3 Encounters:   07/10/23 132/78   06/29/23 (!) 159/72   06/14/23 (!) 150/93          ([id number])    Past Medical History:   Diagnosis Date    AK (actinic

## 2023-07-12 ENCOUNTER — HOSPITAL ENCOUNTER (OUTPATIENT)
Age: 78
Setting detail: SPECIMEN
Discharge: HOME | End: 2023-07-12

## 2023-07-12 DIAGNOSIS — R73.09 ELEVATED GLUCOSE: ICD-10-CM

## 2023-07-12 DIAGNOSIS — E78.5 DYSLIPIDEMIA: ICD-10-CM

## 2023-07-12 DIAGNOSIS — E83.42 HYPOMAGNESEMIA: ICD-10-CM

## 2023-07-12 DIAGNOSIS — E03.9 HYPOTHYROIDISM, UNSPECIFIED TYPE: ICD-10-CM

## 2023-07-12 DIAGNOSIS — E55.9 VITAMIN D DEFICIENCY: ICD-10-CM

## 2023-07-12 LAB
25(OH)D3 SERPL-MCNC: 24.6 NG/ML
ALBUMIN SERPL-MCNC: 3.8 G/DL (ref 3.5–5.2)
ALBUMIN/GLOB SERPL: 1.4 {RATIO} (ref 1–2.5)
ALP SERPL-CCNC: 91 U/L (ref 35–104)
ALT SERPL-CCNC: 25 U/L (ref 5–33)
ANION GAP SERPL CALCULATED.3IONS-SCNC: 13 MMOL/L (ref 9–17)
AST SERPL-CCNC: 18 U/L
BILIRUB SERPL-MCNC: 0.6 MG/DL (ref 0.3–1.2)
BUN SERPL-MCNC: 24 MG/DL (ref 8–23)
CALCIUM SERPL-MCNC: 9.5 MG/DL (ref 8.6–10.4)
CHLORIDE SERPL-SCNC: 107 MMOL/L (ref 98–107)
CHOLEST SERPL-MCNC: 206 MG/DL
CHOLESTEROL/HDL RATIO: 3.3
CO2 SERPL-SCNC: 23 MMOL/L (ref 20–31)
CREAT SERPL-MCNC: 0.9 MG/DL (ref 0.5–0.9)
GFR SERPL CREATININE-BSD FRML MDRD: >60 ML/MIN/1.73M2
GLUCOSE SERPL-MCNC: 80 MG/DL (ref 70–99)
HDLC SERPL-MCNC: 62 MG/DL
LDLC SERPL CALC-MCNC: 130 MG/DL (ref 0–130)
MAGNESIUM SERPL-MCNC: 2.1 MG/DL (ref 1.6–2.6)
POTASSIUM SERPL-SCNC: 4.7 MMOL/L (ref 3.7–5.3)
PROT SERPL-MCNC: 6.6 G/DL (ref 6.4–8.3)
SODIUM SERPL-SCNC: 143 MMOL/L (ref 135–144)
TRIGL SERPL-MCNC: 68 MG/DL
TSH SERPL DL<=0.05 MIU/L-ACNC: 13.05 UIU/ML (ref 0.3–5)

## 2023-07-13 DIAGNOSIS — E03.9 HYPOTHYROIDISM, UNSPECIFIED TYPE: Primary | ICD-10-CM

## 2023-07-13 LAB
EST. AVERAGE GLUCOSE BLD GHB EST-MCNC: 103 MG/DL
HBA1C MFR BLD: 5.2 % (ref 4–6)

## 2023-07-13 RX ORDER — PANTOPRAZOLE SODIUM 40 MG/1
40 TABLET, DELAYED RELEASE ORAL DAILY
Qty: 30 TABLET | Refills: 5 | Status: SHIPPED | OUTPATIENT
Start: 2023-07-13

## 2023-07-13 RX ORDER — LEVOTHYROXINE SODIUM 0.03 MG/1
25 TABLET ORAL DAILY
Qty: 90 TABLET | Refills: 1 | Status: SHIPPED | OUTPATIENT
Start: 2023-07-13

## 2023-07-13 RX ORDER — ERGOCALCIFEROL 1.25 MG/1
50000 CAPSULE ORAL WEEKLY
Qty: 12 CAPSULE | Refills: 3 | Status: SHIPPED | OUTPATIENT
Start: 2023-07-13

## 2023-07-13 NOTE — TELEPHONE ENCOUNTER
Pharmacy states patient needs protonix prescriptionmarked as MIRIAM for protonix not pantoprazole so she can have the brand name and not generic. Patient is also wanting to resume levothyroxine and would like vitamin d sent in, please advise.    Last Visit Date: 7/10/2023   Next Visit Date: 1/10/2024

## 2023-07-13 NOTE — TELEPHONE ENCOUNTER
Pharmacy called back in states they are still getting prescription as pantoprazole MIRIAM instead of protonix. Writer is unable to pend protonix MIRIAM. Please advise.

## 2023-07-13 NOTE — TELEPHONE ENCOUNTER
Spoke with pharmacist [facility]  Next refill available 7/28/23 and will be brand name.  Please let patient know  thanks

## 2023-08-28 ENCOUNTER — TELEPHONE (OUTPATIENT)
Dept: PRIMARY CARE CLINIC | Age: 78
End: 2023-08-28

## 2023-08-28 NOTE — TELEPHONE ENCOUNTER
Patient notified and verbalized understanding, states she will come in to the walk-in clinic to be seen as she does not want to wait until Wednesday.

## 2023-08-28 NOTE — TELEPHONE ENCOUNTER
Patient called in asking for an appointment with PCP today because she fell x 1 week ago and hit her head. She states she has a bump on her head that has not gone down, and she has bruises on her face that are fading but she wanted to be checked out    PCP does not have availability today.  Patient would like to know what PCP advises

## 2023-12-12 ENCOUNTER — OFFICE VISIT (OUTPATIENT)
Dept: UROLOGY | Age: 78
End: 2023-12-12
Payer: MEDICARE

## 2023-12-12 VITALS
HEART RATE: 84 BPM | DIASTOLIC BLOOD PRESSURE: 89 MMHG | TEMPERATURE: 97 F | SYSTOLIC BLOOD PRESSURE: 130 MMHG | OXYGEN SATURATION: 98 % | BODY MASS INDEX: 42.85 KG/M2 | WEIGHT: 251 LBS | HEIGHT: 64 IN

## 2023-12-12 DIAGNOSIS — R39.15 URGENCY OF URINATION: Primary | ICD-10-CM

## 2023-12-12 DIAGNOSIS — R35.1 NOCTURIA: ICD-10-CM

## 2023-12-12 DIAGNOSIS — N39.41 URGE INCONTINENCE: ICD-10-CM

## 2023-12-12 PROCEDURE — G8400 PT W/DXA NO RESULTS DOC: HCPCS | Performed by: UROLOGY

## 2023-12-12 PROCEDURE — G8427 DOCREV CUR MEDS BY ELIG CLIN: HCPCS | Performed by: UROLOGY

## 2023-12-12 PROCEDURE — G8417 CALC BMI ABV UP PARAM F/U: HCPCS | Performed by: UROLOGY

## 2023-12-12 PROCEDURE — 1090F PRES/ABSN URINE INCON ASSESS: CPT | Performed by: UROLOGY

## 2023-12-12 PROCEDURE — 3079F DIAST BP 80-89 MM HG: CPT | Performed by: UROLOGY

## 2023-12-12 PROCEDURE — 1123F ACP DISCUSS/DSCN MKR DOCD: CPT | Performed by: UROLOGY

## 2023-12-12 PROCEDURE — 3075F SYST BP GE 130 - 139MM HG: CPT | Performed by: UROLOGY

## 2023-12-12 PROCEDURE — 1036F TOBACCO NON-USER: CPT | Performed by: UROLOGY

## 2023-12-12 PROCEDURE — 99214 OFFICE O/P EST MOD 30 MIN: CPT | Performed by: UROLOGY

## 2023-12-12 PROCEDURE — 0509F URINE INCON PLAN DOCD: CPT | Performed by: UROLOGY

## 2023-12-12 PROCEDURE — G8484 FLU IMMUNIZE NO ADMIN: HCPCS | Performed by: UROLOGY

## 2023-12-12 ASSESSMENT — ENCOUNTER SYMPTOMS
CONSTIPATION: 0
EYE REDNESS: 0
EYE PAIN: 0
ABDOMINAL PAIN: 0
DIARRHEA: 0
SHORTNESS OF BREATH: 0
COUGH: 0
BACK PAIN: 0
VOMITING: 0
WHEEZING: 0
NAUSEA: 0

## 2023-12-12 NOTE — PROGRESS NOTES
[address]  [address] [address]  Dept: [address] [place] [address] Urology Office Note - Established    Patient:  Allen Shultz  YOB: 1945  Date: 12/12/2023    The patient is a 78 y.o. female whopresents today for evaluation of the following problems:   Chief Complaint   Patient presents with    Other     6 month post cysto botox        HPI  She is here for OAB. She tried botox 200 Units. She voids every 2-3 hours during the day. She has rare leaking. She is up 2-3 times per night. She has urge incontinence. She goes through 3-4 pads per day. Summary of old records: N/A    Additional History: N/A    Procedures Today: N/A    Urinalysis today:  No results found for this visit on 12/12/23. Imaging Reviewed during this Office Visit: none  (results were independently reviewed by physician and radiology report verified)    AUA Symptom Score (12/12/2023): Last BUN and creatinine:  Lab Results   Component Value Date    BUN 24 (H) 07/12/2023     Lab Results   Component Value Date    CREATININE 0.9 07/12/2023       Additional Lab/Culture results: none    PAST MEDICAL, FAMILY AND SOCIAL HISTORY UPDATE:  Past Medical History:   Diagnosis Date    AK (actinic keratosis)     from sun exposure    Anesthesia     HARD TIME WAKING UP \"SOMETIMES\".     Arthritis     Asymptomatic bilateral carotid artery stenosis 03/10/2015    Atherosclerosis of native coronary artery of native heart without angina pectoris 5/23/2017    Bladder cancer ([address])     bladder, first time 1993    CAD (coronary artery disease)     x3 stents    Colon polyp     Dr. Belen Cobian- Venita leal present     Diarrhea     Difficult intravenous access     Difficult intubation     SMALL MOUTH AND AIRWAY    Fall     in bath tub 11-24-21, left knee warm and bruised, BRUISED TO CHEEKS AND NOSE, ARMS, KNEES AND LEFT

## 2024-01-07 SDOH — HEALTH STABILITY: PHYSICAL HEALTH: ON AVERAGE, HOW MANY DAYS PER WEEK DO YOU ENGAGE IN MODERATE TO STRENUOUS EXERCISE (LIKE A BRISK WALK)?: 0 DAYS

## 2024-01-07 ASSESSMENT — LIFESTYLE VARIABLES
HOW MANY STANDARD DRINKS CONTAINING ALCOHOL DO YOU HAVE ON A TYPICAL DAY: 1
HOW OFTEN DO YOU HAVE SIX OR MORE DRINKS ON ONE OCCASION: 1
HOW OFTEN DO YOU HAVE A DRINK CONTAINING ALCOHOL: 2-3 TIMES A WEEK
HOW MANY STANDARD DRINKS CONTAINING ALCOHOL DO YOU HAVE ON A TYPICAL DAY: 1 OR 2
HOW OFTEN DO YOU HAVE A DRINK CONTAINING ALCOHOL: 4

## 2024-01-07 ASSESSMENT — PATIENT HEALTH QUESTIONNAIRE - PHQ9
2. FEELING DOWN, DEPRESSED OR HOPELESS: 0
SUM OF ALL RESPONSES TO PHQ QUESTIONS 1-9: 0
SUM OF ALL RESPONSES TO PHQ9 QUESTIONS 1 & 2: 0
1. LITTLE INTEREST OR PLEASURE IN DOING THINGS: 0

## 2024-01-10 ENCOUNTER — OFFICE VISIT (OUTPATIENT)
Dept: PRIMARY CARE CLINIC | Age: 79
End: 2024-01-10
Payer: MEDICARE

## 2024-01-10 VITALS
WEIGHT: 265 LBS | SYSTOLIC BLOOD PRESSURE: 126 MMHG | HEIGHT: 64 IN | RESPIRATION RATE: 15 BRPM | BODY MASS INDEX: 45.24 KG/M2 | HEART RATE: 88 BPM | DIASTOLIC BLOOD PRESSURE: 84 MMHG | OXYGEN SATURATION: 96 %

## 2024-01-10 DIAGNOSIS — Z00.00 ENCOUNTER FOR GENERAL ADULT MEDICAL EXAMINATION W/O ABNORMAL FINDINGS: Primary | ICD-10-CM

## 2024-01-10 DIAGNOSIS — R05.1 ACUTE COUGH: ICD-10-CM

## 2024-01-10 DIAGNOSIS — G47.30 SLEEP APNEA, UNSPECIFIED TYPE: ICD-10-CM

## 2024-01-10 DIAGNOSIS — C67.9 MALIGNANT NEOPLASM OF URINARY BLADDER, UNSPECIFIED SITE (HCC): ICD-10-CM

## 2024-01-10 PROCEDURE — G0439 PPPS, SUBSEQ VISIT: HCPCS | Performed by: NURSE PRACTITIONER

## 2024-01-10 PROCEDURE — G8484 FLU IMMUNIZE NO ADMIN: HCPCS | Performed by: NURSE PRACTITIONER

## 2024-01-10 PROCEDURE — 3074F SYST BP LT 130 MM HG: CPT | Performed by: NURSE PRACTITIONER

## 2024-01-10 PROCEDURE — 3079F DIAST BP 80-89 MM HG: CPT | Performed by: NURSE PRACTITIONER

## 2024-01-10 PROCEDURE — 1123F ACP DISCUSS/DSCN MKR DOCD: CPT | Performed by: NURSE PRACTITIONER

## 2024-01-10 RX ORDER — BENZONATATE 200 MG/1
200 CAPSULE ORAL 3 TIMES DAILY PRN
Qty: 30 CAPSULE | Refills: 0 | Status: SHIPPED | OUTPATIENT
Start: 2024-01-10 | End: 2024-01-17

## 2024-01-10 NOTE — PROGRESS NOTES
Medicare Annual Wellness Visit    Renee Antonio is here for Medicare AW    Assessment & Plan   Encounter for general adult medical examination w/o abnormal findings  Malignant neoplasm of urinary bladder, unspecified site (HCC)  Acute cough  -     benzonatate (TESSALON) 200 MG capsule; Take 1 capsule by mouth 3 times daily as needed for Cough, Disp-30 capsule, R-0Normal    Recommendations for Preventive Services Due: see orders and patient instructions/AVS.  Recommended screening schedule for the next 5-10 years is provided to the patient in written form: see Patient Instructions/AVS.     Return in about 6 months (around 7/10/2024) for recheck.     Subjective   The following acute and/or chronic problems were also addressed today:  See below    Patient's complete Health Risk Assessment and screening values have been reviewed and are found in Flowsheets. The following problems were reviewed today and where indicated follow up appointments were made and/or referrals ordered.    Positive Risk Factor Screenings with Interventions:               General HRA Questions:  Select all that apply: (!) New or Increased Pain    Pain Interventions:  See above      Activity, Diet, and Weight:  On average, how many days per week do you engage in moderate to strenuous exercise (like a brisk walk)?: 0 days       Do you eat balanced/healthy meals regularly?: Yes    Body mass index is 46.21 kg/m². (!) Abnormal      Obesity Interventions:  Patient declines any further evaluation or treatment             Hearing Screen:  Do you or your family notice any trouble with your hearing that hasn't been managed with hearing aids?: (!) Yes    Interventions:  Patient declines any further evaluation or treatment                     Objective   Vitals:    01/10/24 1050   BP: 126/84   Pulse: 88   Resp: 15   SpO2: 96%   Weight: 120.2 kg (265 lb)   Height: 1.613 m (5' 3.5\")      Body mass index is 46.21 kg/m².      General Appearance: alert and

## 2024-01-29 ENCOUNTER — TELEPHONE (OUTPATIENT)
Dept: PRIMARY CARE CLINIC | Age: 79
End: 2024-01-29

## 2024-01-29 NOTE — TELEPHONE ENCOUNTER
Patient called in stating she got an appointment with [facility] for her heart, they want reports from certain testing that she had back in 2014/2015. She says the testing was done through [facility], [facility] and another place she can't remember but she's been calling all around and is not getting any help and was wondering if PCP can help. She needs the reports from her Echocardiogram, Cardiac MRI, Cardiac CT, and whenever she had her stent placed. Writer did advise she try her cardiologist to see if they have the reports but she said they haven't answered her    Please advise.

## 2024-01-29 NOTE — TELEPHONE ENCOUNTER
I can see these results in Care Everywhere, under [facility]  Please have patient update [facility] to see if this is accurate and everything they need    If not, patient will need to contact cardiology or [facility]    Thanks

## 2024-01-31 ENCOUNTER — TELEPHONE (OUTPATIENT)
Dept: PRIMARY CARE CLINIC | Age: 79
End: 2024-01-31

## 2024-01-31 NOTE — TELEPHONE ENCOUNTER
Patient calling into the office, verbalized understanding    Last Visit Date: 1/10/2024   Next Visit Date: 7/10/2024

## 2024-01-31 NOTE — TELEPHONE ENCOUNTER
Appt made 2/1 at 915am enedina moncada. Patient wants assistance completing medical record request form from [facility], patient wants PCP to help

## 2024-01-31 NOTE — TELEPHONE ENCOUNTER
Pt called reporting she has forms that need filled out for an appt with [facility] 02/15/24 with information from records she has from [facility], pt would like to schedule an appt      Please advise

## 2024-02-01 ENCOUNTER — OFFICE VISIT (OUTPATIENT)
Dept: PRIMARY CARE CLINIC | Age: 79
End: 2024-02-01
Payer: MEDICARE

## 2024-02-01 VITALS — RESPIRATION RATE: 15 BRPM | HEIGHT: 64 IN | WEIGHT: 264 LBS | BODY MASS INDEX: 45.07 KG/M2

## 2024-02-01 DIAGNOSIS — I10 PRIMARY HYPERTENSION: Primary | ICD-10-CM

## 2024-02-01 PROCEDURE — G8417 CALC BMI ABV UP PARAM F/U: HCPCS | Performed by: NURSE PRACTITIONER

## 2024-02-01 PROCEDURE — G8427 DOCREV CUR MEDS BY ELIG CLIN: HCPCS | Performed by: NURSE PRACTITIONER

## 2024-02-01 PROCEDURE — 99211 OFF/OP EST MAY X REQ PHY/QHP: CPT | Performed by: NURSE PRACTITIONER

## 2024-02-01 ASSESSMENT — PATIENT HEALTH QUESTIONNAIRE - PHQ9
SUM OF ALL RESPONSES TO PHQ QUESTIONS 1-9: 0
1. LITTLE INTEREST OR PLEASURE IN DOING THINGS: 0
SUM OF ALL RESPONSES TO PHQ QUESTIONS 1-9: 0
SUM OF ALL RESPONSES TO PHQ9 QUESTIONS 1 & 2: 0
2. FEELING DOWN, DEPRESSED OR HOPELESS: 0

## 2024-02-01 NOTE — PROGRESS NOTES
Patient met with MA to discuss needed testing results from 2014 to [facility]  Reviewed with patient they are available in Care Everywhere  MA will contact [facility] to review    Follow up as needed

## 2024-02-13 ENCOUNTER — OFFICE VISIT (OUTPATIENT)
Dept: UROLOGY | Age: 79
End: 2024-02-13
Payer: MEDICARE

## 2024-02-13 VITALS
HEART RATE: 78 BPM | WEIGHT: 264 LBS | BODY MASS INDEX: 45.07 KG/M2 | SYSTOLIC BLOOD PRESSURE: 155 MMHG | HEIGHT: 64 IN | DIASTOLIC BLOOD PRESSURE: 82 MMHG | TEMPERATURE: 97.4 F

## 2024-02-13 DIAGNOSIS — Z85.51 HISTORY OF BLADDER CANCER: ICD-10-CM

## 2024-02-13 DIAGNOSIS — R39.15 URGENCY OF URINATION: Primary | ICD-10-CM

## 2024-02-13 DIAGNOSIS — N39.41 URGE INCONTINENCE: ICD-10-CM

## 2024-02-13 PROCEDURE — G8417 CALC BMI ABV UP PARAM F/U: HCPCS | Performed by: UROLOGY

## 2024-02-13 PROCEDURE — 3079F DIAST BP 80-89 MM HG: CPT | Performed by: UROLOGY

## 2024-02-13 PROCEDURE — G8484 FLU IMMUNIZE NO ADMIN: HCPCS | Performed by: UROLOGY

## 2024-02-13 PROCEDURE — G8427 DOCREV CUR MEDS BY ELIG CLIN: HCPCS | Performed by: UROLOGY

## 2024-02-13 PROCEDURE — 1123F ACP DISCUSS/DSCN MKR DOCD: CPT | Performed by: UROLOGY

## 2024-02-13 PROCEDURE — G8400 PT W/DXA NO RESULTS DOC: HCPCS | Performed by: UROLOGY

## 2024-02-13 PROCEDURE — 99214 OFFICE O/P EST MOD 30 MIN: CPT | Performed by: UROLOGY

## 2024-02-13 PROCEDURE — 1090F PRES/ABSN URINE INCON ASSESS: CPT | Performed by: UROLOGY

## 2024-02-13 PROCEDURE — 3077F SYST BP >= 140 MM HG: CPT | Performed by: UROLOGY

## 2024-02-13 PROCEDURE — 0509F URINE INCON PLAN DOCD: CPT | Performed by: UROLOGY

## 2024-02-13 PROCEDURE — 1036F TOBACCO NON-USER: CPT | Performed by: UROLOGY

## 2024-02-13 NOTE — PROGRESS NOTES
Review of Systems   Constitutional: Negative.  Negative for appetite change, chills and fever.   Eyes: Negative.  Negative for pain, redness and visual disturbance.   Respiratory: Negative.  Negative for cough, shortness of breath and wheezing.    Cardiovascular: Negative.  Negative for chest pain and leg swelling.   Gastrointestinal: Negative.  Negative for abdominal pain, constipation, diarrhea, nausea and vomiting.   Genitourinary: Negative.  Negative for difficulty urinating, dysuria, flank pain, frequency, hematuria and urgency.   Musculoskeletal: Negative.  Negative for back pain, joint swelling and myalgias.   Skin: Negative.  Negative for rash and wound.   Neurological: Negative.  Negative for dizziness, tremors and numbness.   Hematological: Negative.  Does not bruise/bleed easily.     
Currently    Comment: COUPLE DRINKS PER WEEK.       REVIEW OF SYSTEMS:  Review of Systems      Physical Exam:      Vitals:    02/13/24 1108   BP: (!) 155/82   Pulse: 78   Temp: 97.4 °F (36.3 °C)     Body mass index is 46.03 kg/m².  Patient is a 78 y.o. female in noacute distress and alert and oriented to person, place and time.  Physical Exam  Constitutional: Patient in no acute distress.  Neuro: Alert andoriented to person, place and time.  Psych: Mood normal, affect normal  Skin: No rash noted  HEENT: Head: Normocephalic and atraumatic      and Plan      1. Urgency of urination    2. Urge incontinence    3. History of bladder cancer           Plan:        She has a h/o bladder cancer.   Cysto was negative.   Botox was last done in July.   She seems to be doing well.   She is due for a cysto in July given h/o bladder cancer.   Will likely need botox at that time as well.       Return in about 5 months (around 7/13/2024) for Cystoscopy, with botox. .    Prescriptions Ordered:  No orders of the defined types were placed in this encounter.     Orders Placed:  No orders of the defined types were placed in this encounter.           Sathish Morales MD    Agree with the ROS entered by the MA.

## 2024-05-13 ENCOUNTER — OFFICE VISIT (OUTPATIENT)
Dept: PRIMARY CARE CLINIC | Age: 79
End: 2024-05-13
Payer: MEDICARE

## 2024-05-13 ENCOUNTER — TELEPHONE (OUTPATIENT)
Dept: PRIMARY CARE CLINIC | Age: 79
End: 2024-05-13

## 2024-05-13 ENCOUNTER — HOSPITAL ENCOUNTER (OUTPATIENT)
Age: 79
Setting detail: SPECIMEN
Discharge: HOME | End: 2024-05-13

## 2024-05-13 VITALS
WEIGHT: 261.4 LBS | RESPIRATION RATE: 17 BRPM | SYSTOLIC BLOOD PRESSURE: 122 MMHG | HEIGHT: 61 IN | DIASTOLIC BLOOD PRESSURE: 82 MMHG | OXYGEN SATURATION: 98 % | BODY MASS INDEX: 49.35 KG/M2 | HEART RATE: 62 BPM

## 2024-05-13 DIAGNOSIS — E78.5 DYSLIPIDEMIA: ICD-10-CM

## 2024-05-13 DIAGNOSIS — I10 PRIMARY HYPERTENSION: Primary | ICD-10-CM

## 2024-05-13 DIAGNOSIS — E03.9 HYPOTHYROIDISM, UNSPECIFIED TYPE: ICD-10-CM

## 2024-05-13 DIAGNOSIS — M17.0 PRIMARY OSTEOARTHRITIS OF BOTH KNEES: ICD-10-CM

## 2024-05-13 DIAGNOSIS — F51.01 PRIMARY INSOMNIA: ICD-10-CM

## 2024-05-13 DIAGNOSIS — G47.30 SLEEP APNEA, UNSPECIFIED TYPE: ICD-10-CM

## 2024-05-13 LAB
T4 FREE SERPL-MCNC: 1 NG/DL (ref 0.92–1.68)
TSH SERPL DL<=0.05 MIU/L-ACNC: 8.81 UIU/ML (ref 0.27–4.2)

## 2024-05-13 PROCEDURE — 3079F DIAST BP 80-89 MM HG: CPT | Performed by: NURSE PRACTITIONER

## 2024-05-13 PROCEDURE — G8400 PT W/DXA NO RESULTS DOC: HCPCS | Performed by: NURSE PRACTITIONER

## 2024-05-13 PROCEDURE — G8417 CALC BMI ABV UP PARAM F/U: HCPCS | Performed by: NURSE PRACTITIONER

## 2024-05-13 PROCEDURE — 1123F ACP DISCUSS/DSCN MKR DOCD: CPT | Performed by: NURSE PRACTITIONER

## 2024-05-13 PROCEDURE — G2211 COMPLEX E/M VISIT ADD ON: HCPCS | Performed by: NURSE PRACTITIONER

## 2024-05-13 PROCEDURE — 1036F TOBACCO NON-USER: CPT | Performed by: NURSE PRACTITIONER

## 2024-05-13 PROCEDURE — 3074F SYST BP LT 130 MM HG: CPT | Performed by: NURSE PRACTITIONER

## 2024-05-13 PROCEDURE — G8427 DOCREV CUR MEDS BY ELIG CLIN: HCPCS | Performed by: NURSE PRACTITIONER

## 2024-05-13 PROCEDURE — 99214 OFFICE O/P EST MOD 30 MIN: CPT | Performed by: NURSE PRACTITIONER

## 2024-05-13 PROCEDURE — 1090F PRES/ABSN URINE INCON ASSESS: CPT | Performed by: NURSE PRACTITIONER

## 2024-05-13 RX ORDER — TIRZEPATIDE 2.5 MG/.5ML
2.5 INJECTION, SOLUTION SUBCUTANEOUS WEEKLY
Qty: 2 ML | Refills: 1 | Status: SHIPPED | OUTPATIENT
Start: 2024-05-13

## 2024-05-13 SDOH — ECONOMIC STABILITY: FOOD INSECURITY: WITHIN THE PAST 12 MONTHS, THE FOOD YOU BOUGHT JUST DIDN'T LAST AND YOU DIDN'T HAVE MONEY TO GET MORE.: NEVER TRUE

## 2024-05-13 SDOH — ECONOMIC STABILITY: FOOD INSECURITY: WITHIN THE PAST 12 MONTHS, YOU WORRIED THAT YOUR FOOD WOULD RUN OUT BEFORE YOU GOT MONEY TO BUY MORE.: NEVER TRUE

## 2024-05-13 SDOH — ECONOMIC STABILITY: INCOME INSECURITY: HOW HARD IS IT FOR YOU TO PAY FOR THE VERY BASICS LIKE FOOD, HOUSING, MEDICAL CARE, AND HEATING?: NOT HARD AT ALL

## 2024-05-13 ASSESSMENT — PATIENT HEALTH QUESTIONNAIRE - PHQ9
SUM OF ALL RESPONSES TO PHQ QUESTIONS 1-9: 0
SUM OF ALL RESPONSES TO PHQ9 QUESTIONS 1 & 2: 0
2. FEELING DOWN, DEPRESSED OR HOPELESS: NOT AT ALL
SUM OF ALL RESPONSES TO PHQ QUESTIONS 1-9: 0
1. LITTLE INTEREST OR PLEASURE IN DOING THINGS: NOT AT ALL
SUM OF ALL RESPONSES TO PHQ QUESTIONS 1-9: 0
SUM OF ALL RESPONSES TO PHQ QUESTIONS 1-9: 0

## 2024-05-13 ASSESSMENT — ENCOUNTER SYMPTOMS
ABDOMINAL PAIN: 0
SHORTNESS OF BREATH: 0
COUGH: 0
BACK PAIN: 0

## 2024-05-13 NOTE — TELEPHONE ENCOUNTER
Patient calling into the office, states that she can not afford zepbound. She states that it is $1,200.00. She is asking if PCP is okay with her starting semaglutide and having it sent to [facility]. Please advise

## 2024-05-13 NOTE — TELEPHONE ENCOUNTER
Form filled and faxed. Will scan into media. Patient has been advised.    Last Visit Date: 5/13/2024   Next Visit Date: 7/10/2024

## 2024-05-13 NOTE — PROGRESS NOTES
[facility]  [facility] PRIMARY CARE  [address] [place] [address]   [address]  [facility] [address]  Dept: [phone]  Dept Fax: [phone]    Renee Antonio is a 79 y.o. female who presentstoday for her medical conditions/complaints as noted below.  Renee Antonio is c/o of  Chief Complaint   Patient presents with    Weight Management     Keep gaining weight     Knee Pain           HPI:     Presents for recheck on chronic conditions  BP well controlled  Weight is stable    Currently working on weight loss  Watching diet  Unable to exercise d/t bilateral knee pain  Also notes that knee pain would improve if she was able to lose weight    Reviewed elevated TSH  Has not been taking medication  Will update lab today  Reviewed this may be contributing to weight gain    Has entered medical study, will begin tonight  Reviewed possible use of zepbound if covered  Will also need to review med use with her medical study    Denies any other problems/concerns        Hemoglobin A1C (%)   Date Value   07/12/2023 5.2   12/14/2020 5.7             ( goal A1C is < 7)   No components found for: \"LABMICR\"  No components found for: \"LDLCHOLESTEROL\", \"LDLCALC\"    (goal LDL is <100)   AST (U/L)   Date Value   07/12/2023 18     ALT (U/L)   Date Value   07/12/2023 25     BUN (mg/dL)   Date Value   07/12/2023 24 (H)     BP Readings from Last 3 Encounters:   05/13/24 122/82   02/13/24 (!) 155/82   01/10/24 126/84          ([id number])    Past Medical History:   Diagnosis Date    AK (actinic keratosis)     from sun exposure    Anesthesia     HARD TIME WAKING UP \"SOMETIMES\".    Arthritis     Asymptomatic bilateral carotid artery stenosis 03/10/2015    Atherosclerosis of native coronary artery of native heart without angina pectoris 5/23/2017    Bladder cancer (HCC)     bladder, first time 1993    CAD (coronary artery disease)     x3 stents    Colon polyp     Dr. Diaz- GI    Dental crowns present     Diarrhea     Difficult

## 2024-05-14 DIAGNOSIS — E03.9 HYPOTHYROIDISM, UNSPECIFIED TYPE: Primary | ICD-10-CM

## 2024-05-14 RX ORDER — LEVOTHYROXINE SODIUM 0.03 MG/1
25 TABLET ORAL DAILY
Qty: 90 TABLET | Refills: 1 | Status: SHIPPED | OUTPATIENT
Start: 2024-05-14

## 2024-06-11 ENCOUNTER — TELEPHONE (OUTPATIENT)
Dept: PRIMARY CARE CLINIC | Age: 79
End: 2024-06-11

## 2024-06-11 RX ORDER — ONDANSETRON 4 MG/1
4 TABLET, FILM COATED ORAL EVERY 8 HOURS PRN
Qty: 20 TABLET | Refills: 0 | Status: SHIPPED | OUTPATIENT
Start: 2024-06-11

## 2024-06-11 NOTE — TELEPHONE ENCOUNTER
Patient notified and verbalized understanding    Writer faxed the Semaglutide refill to [facility].

## 2024-06-11 NOTE — TELEPHONE ENCOUNTER
Patient called requesting next dose of Semoglutide sent to [facility].    She also states a couple days after each weekly injection she gets nauseated.  When asked how long nausea lasts, she states it depends-sometimes she gets a cracker or something and that helps some.  She is asking if there is anyway that she can take weekly dose in 2 doses thru out week?    Please advise on both questions.

## 2024-07-09 ENCOUNTER — TELEPHONE (OUTPATIENT)
Dept: UROLOGY | Age: 79
End: 2024-07-09

## 2024-07-09 NOTE — TELEPHONE ENCOUNTER
Contacted patient to follow up in regards to if she would like to proceed with scheduling Botox injection. Patient states that she would just like to have Cysto done at this time and will follow up in regards to botox.

## 2024-07-10 ENCOUNTER — OFFICE VISIT (OUTPATIENT)
Dept: PRIMARY CARE CLINIC | Age: 79
End: 2024-07-10

## 2024-07-10 VITALS
HEART RATE: 75 BPM | WEIGHT: 250.4 LBS | BODY MASS INDEX: 42.75 KG/M2 | HEIGHT: 64 IN | RESPIRATION RATE: 15 BRPM | OXYGEN SATURATION: 98 % | SYSTOLIC BLOOD PRESSURE: 124 MMHG | DIASTOLIC BLOOD PRESSURE: 78 MMHG

## 2024-07-10 DIAGNOSIS — F51.01 PRIMARY INSOMNIA: ICD-10-CM

## 2024-07-10 DIAGNOSIS — E78.5 DYSLIPIDEMIA: ICD-10-CM

## 2024-07-10 DIAGNOSIS — G47.30 SLEEP APNEA, UNSPECIFIED TYPE: ICD-10-CM

## 2024-07-10 DIAGNOSIS — I10 PRIMARY HYPERTENSION: Primary | ICD-10-CM

## 2024-07-10 DIAGNOSIS — L30.4 INTERTRIGO: ICD-10-CM

## 2024-07-10 DIAGNOSIS — R73.09 ELEVATED GLUCOSE: ICD-10-CM

## 2024-07-10 DIAGNOSIS — M17.0 PRIMARY OSTEOARTHRITIS OF BOTH KNEES: ICD-10-CM

## 2024-07-10 DIAGNOSIS — R53.83 OTHER FATIGUE: ICD-10-CM

## 2024-07-10 DIAGNOSIS — E03.9 HYPOTHYROIDISM, UNSPECIFIED TYPE: ICD-10-CM

## 2024-07-10 DIAGNOSIS — E55.9 VITAMIN D DEFICIENCY: ICD-10-CM

## 2024-07-10 RX ORDER — LOSARTAN POTASSIUM 50 MG/1
50 TABLET ORAL DAILY
COMMUNITY
Start: 2024-06-18

## 2024-07-10 RX ORDER — ROSUVASTATIN CALCIUM 20 MG/1
20 TABLET, COATED ORAL DAILY
COMMUNITY
Start: 2024-06-24

## 2024-07-10 RX ORDER — LORATADINE 10 MG/1
10 TABLET ORAL DAILY
Qty: 30 TABLET | Refills: 0 | Status: SHIPPED | OUTPATIENT
Start: 2024-07-10

## 2024-07-10 RX ORDER — CLOPIDOGREL BISULFATE 75 MG/1
75 TABLET ORAL DAILY
COMMUNITY
Start: 2024-06-24

## 2024-07-10 RX ORDER — OMEPRAZOLE 10 MG/1
10 CAPSULE, DELAYED RELEASE ORAL DAILY
COMMUNITY

## 2024-07-10 RX ORDER — CLOTRIMAZOLE AND BETAMETHASONE DIPROPIONATE 10; .64 MG/G; MG/G
CREAM TOPICAL
Qty: 45 G | Refills: 1 | Status: SHIPPED | OUTPATIENT
Start: 2024-07-10

## 2024-07-10 SDOH — ECONOMIC STABILITY: FOOD INSECURITY: WITHIN THE PAST 12 MONTHS, THE FOOD YOU BOUGHT JUST DIDN'T LAST AND YOU DIDN'T HAVE MONEY TO GET MORE.: NEVER TRUE

## 2024-07-10 SDOH — ECONOMIC STABILITY: INCOME INSECURITY: HOW HARD IS IT FOR YOU TO PAY FOR THE VERY BASICS LIKE FOOD, HOUSING, MEDICAL CARE, AND HEATING?: NOT HARD AT ALL

## 2024-07-10 SDOH — ECONOMIC STABILITY: FOOD INSECURITY: WITHIN THE PAST 12 MONTHS, YOU WORRIED THAT YOUR FOOD WOULD RUN OUT BEFORE YOU GOT MONEY TO BUY MORE.: NEVER TRUE

## 2024-07-10 ASSESSMENT — PATIENT HEALTH QUESTIONNAIRE - PHQ9
SUM OF ALL RESPONSES TO PHQ QUESTIONS 1-9: 0
1. LITTLE INTEREST OR PLEASURE IN DOING THINGS: NOT AT ALL
2. FEELING DOWN, DEPRESSED OR HOPELESS: NOT AT ALL
SUM OF ALL RESPONSES TO PHQ QUESTIONS 1-9: 0
SUM OF ALL RESPONSES TO PHQ9 QUESTIONS 1 & 2: 0
SUM OF ALL RESPONSES TO PHQ QUESTIONS 1-9: 0
SUM OF ALL RESPONSES TO PHQ QUESTIONS 1-9: 0

## 2024-07-10 ASSESSMENT — ENCOUNTER SYMPTOMS
SHORTNESS OF BREATH: 0
COUGH: 0
ABDOMINAL PAIN: 0
BACK PAIN: 0

## 2024-07-10 NOTE — PROGRESS NOTES
[facility]  [facility] PRIMARY CARE  [address] [place] [address]   [address]  [facility] [address]  Dept: [phone]  Dept Fax: [phone]    Renee Antonio is a 79 y.o. female who presentstoday for her medical conditions/complaints as noted below.  Renee Antonio is c/o of  Chief Complaint   Patient presents with    6 Month Follow-Up    Hypertension    Weight Management     Would like to go up in dose            HPI:     Presents with  for 6 month recheck  BP well controlled  Has lost 11lb since LOV- congratulated patient  Working on diet/exercise    Using wegovy at lowest dose per [facility]  Tolerating well and working for her  Would like to increase dose    Following with cardiology- doing well  Also on the [facility] mind study  Currently taking all meds as prescribed    Concern for redness under bilateral eyes  Will trial claritin x 2 weeks  Following with eye dr regularly    Willing to update annual labs    Denies any other problems/concerns        Hemoglobin A1C (%)   Date Value   07/12/2023 5.2   12/14/2020 5.7             ( goal A1C is < 7)   No components found for: \"LABMICR\"  No components found for: \"LDLCHOLESTEROL\", \"LDLCALC\"    (goal LDL is <100)   AST (U/L)   Date Value   07/12/2023 18     ALT (U/L)   Date Value   07/12/2023 25     BUN (mg/dL)   Date Value   07/12/2023 24 (H)     BP Readings from Last 3 Encounters:   07/10/24 124/78   05/13/24 122/82   02/13/24 (!) 155/82          ([id number])    Past Medical History:   Diagnosis Date    AK (actinic keratosis)     from sun exposure    Anesthesia     HARD TIME WAKING UP \"SOMETIMES\".    Arthritis     Asymptomatic bilateral carotid artery stenosis 03/10/2015    Atherosclerosis of native coronary artery of native heart without angina pectoris 5/23/2017    Bladder cancer (HCC)     bladder, first time 1993    CAD (coronary artery disease)     x3 stents    Colon polyp     Dr. Diaz- GI    Dental crowns present     Diarrhea

## 2024-07-15 ENCOUNTER — TELEPHONE (OUTPATIENT)
Dept: PRIMARY CARE CLINIC | Age: 79
End: 2024-07-15

## 2024-07-15 NOTE — TELEPHONE ENCOUNTER
Pt called reporting she needs a refill on her Semaglutide sent [facility] please and thank you

## 2024-07-16 ENCOUNTER — HOSPITAL ENCOUNTER (OUTPATIENT)
Age: 79
Setting detail: SPECIMEN
Discharge: HOME | End: 2024-07-16

## 2024-07-16 ENCOUNTER — PROCEDURE VISIT (OUTPATIENT)
Dept: UROLOGY | Age: 79
End: 2024-07-16
Payer: MEDICARE

## 2024-07-16 VITALS
HEIGHT: 64 IN | RESPIRATION RATE: 16 BRPM | BODY MASS INDEX: 42.76 KG/M2 | WEIGHT: 250.44 LBS | SYSTOLIC BLOOD PRESSURE: 138 MMHG | HEART RATE: 71 BPM | TEMPERATURE: 96 F | DIASTOLIC BLOOD PRESSURE: 78 MMHG | OXYGEN SATURATION: 98 %

## 2024-07-16 DIAGNOSIS — R82.90 CLOUDY URINE: ICD-10-CM

## 2024-07-16 DIAGNOSIS — Z85.51 HISTORY OF BLADDER CANCER: ICD-10-CM

## 2024-07-16 DIAGNOSIS — R82.90 CLOUDY URINE: Primary | ICD-10-CM

## 2024-07-16 PROCEDURE — 52000 CYSTOURETHROSCOPY: CPT | Performed by: UROLOGY

## 2024-07-16 NOTE — PROGRESS NOTES
Cystoscopy Operative Note (7/16/24):  Surgeon: Sathish Morales MD   Anesthesia: Urethral 2% Xylocaine  Indications: Bladder Cancer   Position: Dorsal Lithotomy    Findings:   Risks and Benefits discussed with patient prior to procedure.  The patient was prepped and draped in the usual sterile fashion.  The flexible cystoscope was advanced through the urethra and into the bladder.  The bladder was thoroughly inspected and the following was noted:    Vagina: normal appearing vagina with normal color and discharge, no lesions  Residual Urine: mild  Urethra: not indicated and normal appearing urethra with no masses, tenderness or lesions  Bladder: No tumors or CIS noted.  No bladder diverticulum.  There was mild trabeculation noted.  Ureters: Clear efflux from both ureters.  Orifices with normal configuration and location.    The cystoscope was removed.  The patient tolerated the procedure well.    Agree with the ROS entered by the MA.    No tumors noted.

## 2024-07-17 LAB
CASE NUMBER:: NORMAL
SPECIMEN DESCRIPTION: NORMAL
SURGICAL PATHOLOGY REPORT: NORMAL

## 2024-07-17 NOTE — TELEPHONE ENCOUNTER
Patient called in regards to this message and states she saw PCP on 7/10/24 about increasing her dose. Writer reviewed progress note and PCP has documented that an increased dose can be sent to [facility]. Order form faxed with increased dosage of 0.6mg/0.5mL

## 2024-07-18 LAB
MICROORGANISM SPEC CULT: ABNORMAL
SERVICE CMNT-IMP: ABNORMAL
SPECIMEN DESCRIPTION: ABNORMAL

## 2024-07-19 RX ORDER — NITROFURANTOIN 25; 75 MG/1; MG/1
100 CAPSULE ORAL 2 TIMES DAILY
Qty: 14 CAPSULE | Refills: 0 | Status: SHIPPED | OUTPATIENT
Start: 2024-07-19 | End: 2024-07-26

## 2024-09-03 DIAGNOSIS — E03.9 HYPOTHYROIDISM, UNSPECIFIED TYPE: ICD-10-CM

## 2024-09-03 RX ORDER — LEVOTHYROXINE SODIUM 25 UG/1
25 TABLET ORAL DAILY
Qty: 90 TABLET | Refills: 3 | Status: SHIPPED | OUTPATIENT
Start: 2024-09-03 | End: 2024-09-05 | Stop reason: SDUPTHER

## 2024-09-03 RX ORDER — METOPROLOL TARTRATE 50 MG
50 TABLET ORAL 2 TIMES DAILY
Qty: 180 TABLET | Refills: 3 | Status: SHIPPED | OUTPATIENT
Start: 2024-09-03

## 2024-09-03 RX ORDER — LOSARTAN POTASSIUM 50 MG/1
50 TABLET ORAL DAILY
Qty: 90 TABLET | Refills: 3 | Status: SHIPPED | OUTPATIENT
Start: 2024-09-03

## 2024-09-03 NOTE — TELEPHONE ENCOUNTER
Patient would also like a refill on Vitamin D     Last Visit Date: 7/10/2024   Next Visit Date: 1/10/2025

## 2024-09-05 ENCOUNTER — HOSPITAL ENCOUNTER (OUTPATIENT)
Age: 79
Setting detail: SPECIMEN
Discharge: HOME | End: 2024-09-05

## 2024-09-05 DIAGNOSIS — I10 PRIMARY HYPERTENSION: ICD-10-CM

## 2024-09-05 DIAGNOSIS — E78.5 DYSLIPIDEMIA: ICD-10-CM

## 2024-09-05 DIAGNOSIS — R73.09 ELEVATED GLUCOSE: ICD-10-CM

## 2024-09-05 DIAGNOSIS — E03.9 HYPOTHYROIDISM, UNSPECIFIED TYPE: ICD-10-CM

## 2024-09-05 DIAGNOSIS — E55.9 VITAMIN D DEFICIENCY: ICD-10-CM

## 2024-09-05 DIAGNOSIS — R53.83 OTHER FATIGUE: ICD-10-CM

## 2024-09-05 LAB
25(OH)D3 SERPL-MCNC: 34.1 NG/ML (ref 30–100)
ALBUMIN SERPL-MCNC: 4.1 G/DL (ref 3.5–5.2)
ALBUMIN/GLOB SERPL: 2 {RATIO} (ref 1–2.5)
ALP SERPL-CCNC: 77 U/L (ref 35–104)
ALT SERPL-CCNC: 9 U/L (ref 10–35)
ANION GAP SERPL CALCULATED.3IONS-SCNC: 10 MMOL/L (ref 9–16)
AST SERPL-CCNC: 14 U/L (ref 10–35)
BILIRUB SERPL-MCNC: 0.7 MG/DL (ref 0–1.2)
BUN SERPL-MCNC: 15 MG/DL (ref 8–23)
CALCIUM SERPL-MCNC: 9.4 MG/DL (ref 8.6–10.4)
CHLORIDE SERPL-SCNC: 106 MMOL/L (ref 98–107)
CHOLEST SERPL-MCNC: 100 MG/DL (ref 0–199)
CHOLESTEROL/HDL RATIO: 2
CO2 SERPL-SCNC: 25 MMOL/L (ref 20–31)
CREAT SERPL-MCNC: 0.9 MG/DL (ref 0.5–0.9)
ERYTHROCYTE [DISTWIDTH] IN BLOOD BY AUTOMATED COUNT: 12.5 % (ref 11.8–14.4)
EST. AVERAGE GLUCOSE BLD GHB EST-MCNC: 111 MG/DL
GFR, ESTIMATED: 64 ML/MIN/1.73M2
GLUCOSE SERPL-MCNC: 83 MG/DL (ref 74–99)
HBA1C MFR BLD: 5.5 % (ref 4–6)
HCT VFR BLD AUTO: 44.6 % (ref 36.3–47.1)
HDLC SERPL-MCNC: 46 MG/DL
HGB BLD-MCNC: 13.7 G/DL (ref 11.9–15.1)
LDLC SERPL CALC-MCNC: 36 MG/DL (ref 0–100)
MCH RBC QN AUTO: 29.1 PG (ref 25.2–33.5)
MCHC RBC AUTO-ENTMCNC: 30.7 G/DL (ref 28.4–34.8)
MCV RBC AUTO: 94.9 FL (ref 82.6–102.9)
NRBC BLD-RTO: 0 PER 100 WBC
PLATELET # BLD AUTO: 223 K/UL (ref 138–453)
PMV BLD AUTO: 9.7 FL (ref 8.1–13.5)
POTASSIUM SERPL-SCNC: 4.8 MMOL/L (ref 3.7–5.3)
PROT SERPL-MCNC: 6.6 G/DL (ref 6.6–8.7)
RBC # BLD AUTO: 4.7 M/UL (ref 3.95–5.11)
SODIUM SERPL-SCNC: 141 MMOL/L (ref 136–145)
T4 FREE SERPL-MCNC: 1.2 NG/DL (ref 0.92–1.68)
TRIGL SERPL-MCNC: 90 MG/DL
TSH SERPL DL<=0.05 MIU/L-ACNC: 7.16 UIU/ML (ref 0.27–4.2)
VLDLC SERPL CALC-MCNC: 18 MG/DL
WBC OTHER # BLD: 8.6 K/UL (ref 3.5–11.3)

## 2024-09-05 RX ORDER — LEVOTHYROXINE SODIUM 50 UG/1
50 TABLET ORAL DAILY
Qty: 90 TABLET | Refills: 0 | Status: SHIPPED | OUTPATIENT
Start: 2024-09-05

## 2024-09-10 ENCOUNTER — TELEPHONE (OUTPATIENT)
Dept: PRIMARY CARE CLINIC | Age: 79
End: 2024-09-10

## 2024-11-11 ENCOUNTER — TELEPHONE (OUTPATIENT)
Dept: PRIMARY CARE CLINIC | Age: 79
End: 2024-11-11

## 2024-11-11 NOTE — TELEPHONE ENCOUNTER
I would contact cardiology first if she is in and out of Afib  If she is not feeling well then advise ER. Thanks

## 2024-11-11 NOTE — TELEPHONE ENCOUNTER
Patient called stating she would like to speak with JERRY Osborne.    Writer informed pt that she is not in the office today.     PT stated she has been in and out of A-fib.     Pt stated she would like to know if she should call Cardiology.     Writer informed pt that she should contact cardiology and be seen in the ER as PCP is done for the day.     Pt stated that is what she figured she should do but thought she would contact PCP to ask.     Writer informed pt that a message will be sent to PCP.

## 2024-11-12 NOTE — TELEPHONE ENCOUNTER
Writer notified patient. Patient states that cardiology hasn't responded to her yet but that her symptoms have calmed down. She will go to ER if symptoms persist but will also continue trying to reach cardiology

## 2024-12-01 DIAGNOSIS — E03.9 HYPOTHYROIDISM, UNSPECIFIED TYPE: ICD-10-CM

## 2024-12-02 RX ORDER — LEVOTHYROXINE SODIUM 50 UG/1
50 TABLET ORAL DAILY
Qty: 90 TABLET | Refills: 0 | Status: SHIPPED | OUTPATIENT
Start: 2024-12-02

## 2024-12-09 ENCOUNTER — TELEPHONE (OUTPATIENT)
Dept: PRIMARY CARE CLINIC | Age: 79
End: 2024-12-09

## 2025-01-10 ENCOUNTER — HOSPITAL ENCOUNTER (OUTPATIENT)
Age: 80
Setting detail: SPECIMEN
Discharge: HOME | End: 2025-01-10

## 2025-01-10 ENCOUNTER — OFFICE VISIT (OUTPATIENT)
Dept: PRIMARY CARE CLINIC | Age: 80
End: 2025-01-10
Payer: MEDICARE

## 2025-01-10 VITALS
BODY MASS INDEX: 39.47 KG/M2 | OXYGEN SATURATION: 98 % | HEIGHT: 64 IN | RESPIRATION RATE: 15 BRPM | DIASTOLIC BLOOD PRESSURE: 82 MMHG | HEART RATE: 86 BPM | SYSTOLIC BLOOD PRESSURE: 118 MMHG | WEIGHT: 231.2 LBS

## 2025-01-10 DIAGNOSIS — L30.4 INTERTRIGO: ICD-10-CM

## 2025-01-10 DIAGNOSIS — C67.9 MALIGNANT NEOPLASM OF URINARY BLADDER, UNSPECIFIED SITE (HCC): ICD-10-CM

## 2025-01-10 DIAGNOSIS — Z00.00 ENCOUNTER FOR GENERAL ADULT MEDICAL EXAMINATION W/O ABNORMAL FINDINGS: Primary | ICD-10-CM

## 2025-01-10 DIAGNOSIS — E03.9 HYPOTHYROIDISM, UNSPECIFIED TYPE: ICD-10-CM

## 2025-01-10 DIAGNOSIS — I10 PRIMARY HYPERTENSION: ICD-10-CM

## 2025-01-10 DIAGNOSIS — G47.30 SLEEP APNEA, UNSPECIFIED TYPE: ICD-10-CM

## 2025-01-10 DIAGNOSIS — F51.01 PRIMARY INSOMNIA: ICD-10-CM

## 2025-01-10 LAB — TSH SERPL DL<=0.05 MIU/L-ACNC: 3.66 UIU/ML (ref 0.27–4.2)

## 2025-01-10 PROCEDURE — 3074F SYST BP LT 130 MM HG: CPT | Performed by: NURSE PRACTITIONER

## 2025-01-10 PROCEDURE — 1123F ACP DISCUSS/DSCN MKR DOCD: CPT | Performed by: NURSE PRACTITIONER

## 2025-01-10 PROCEDURE — 1160F RVW MEDS BY RX/DR IN RCRD: CPT | Performed by: NURSE PRACTITIONER

## 2025-01-10 PROCEDURE — G0439 PPPS, SUBSEQ VISIT: HCPCS | Performed by: NURSE PRACTITIONER

## 2025-01-10 PROCEDURE — 1159F MED LIST DOCD IN RCRD: CPT | Performed by: NURSE PRACTITIONER

## 2025-01-10 PROCEDURE — 3079F DIAST BP 80-89 MM HG: CPT | Performed by: NURSE PRACTITIONER

## 2025-01-10 RX ORDER — ZOLPIDEM TARTRATE 5 MG/1
5 TABLET ORAL NIGHTLY PRN
Qty: 30 TABLET | Refills: 0 | Status: SHIPPED | OUTPATIENT
Start: 2025-01-10 | End: 2025-02-09

## 2025-01-10 RX ORDER — PANTOPRAZOLE SODIUM 40 MG/1
40 TABLET, DELAYED RELEASE ORAL
Qty: 90 TABLET | Refills: 1 | Status: SHIPPED | OUTPATIENT
Start: 2025-01-10

## 2025-01-10 RX ORDER — CLOTRIMAZOLE AND BETAMETHASONE DIPROPIONATE 10; .64 MG/G; MG/G
CREAM TOPICAL
Qty: 45 G | Refills: 1 | Status: SHIPPED | OUTPATIENT
Start: 2025-01-10

## 2025-01-10 SDOH — ECONOMIC STABILITY: FOOD INSECURITY: WITHIN THE PAST 12 MONTHS, YOU WORRIED THAT YOUR FOOD WOULD RUN OUT BEFORE YOU GOT MONEY TO BUY MORE.: NEVER TRUE

## 2025-01-10 SDOH — ECONOMIC STABILITY: FOOD INSECURITY: WITHIN THE PAST 12 MONTHS, THE FOOD YOU BOUGHT JUST DIDN'T LAST AND YOU DIDN'T HAVE MONEY TO GET MORE.: NEVER TRUE

## 2025-01-10 ASSESSMENT — PATIENT HEALTH QUESTIONNAIRE - PHQ9
SUM OF ALL RESPONSES TO PHQ QUESTIONS 1-9: 0
SUM OF ALL RESPONSES TO PHQ QUESTIONS 1-9: 0
SUM OF ALL RESPONSES TO PHQ9 QUESTIONS 1 & 2: 0
SUM OF ALL RESPONSES TO PHQ QUESTIONS 1-9: 0
SUM OF ALL RESPONSES TO PHQ QUESTIONS 1-9: 0
1. LITTLE INTEREST OR PLEASURE IN DOING THINGS: NOT AT ALL
2. FEELING DOWN, DEPRESSED OR HOPELESS: NOT AT ALL

## 2025-01-10 ASSESSMENT — LIFESTYLE VARIABLES
HOW OFTEN DO YOU HAVE A DRINK CONTAINING ALCOHOL: NEVER
HOW MANY STANDARD DRINKS CONTAINING ALCOHOL DO YOU HAVE ON A TYPICAL DAY: PATIENT DOES NOT DRINK

## 2025-01-10 NOTE — PROGRESS NOTES
problems.  Penstrept which ws taken off market in 1960s, Penicillin and Streptomycin    Tobramycin Swelling     Prior to Visit Medications    Medication Sig Taking? Authorizing Provider   clotrimazole-betamethasone (LOTRISONE) 1-0.05 % cream Apply externally to affected area twice daily prn Yes Dora Weber APRN - CNP   zolpidem (AMBIEN) 5 MG tablet Take 1 tablet by mouth nightly as needed for Sleep for up to 30 days. Max Daily Amount: 5 mg Yes Dora Weber APRN - CNP   pantoprazole (PROTONIX) 40 MG tablet Take 1 tablet by mouth every morning (before breakfast) Yes Dora Weber APRN - CNP   levothyroxine (SYNTHROID) 50 MCG tablet TAKE 1 TABLET BY MOUTH DAILY Yes Dora Weber APRN - CNP   losartan (COZAAR) 50 MG tablet Take 1 tablet by mouth daily Yes Dora Weber APRN - CNP   metoprolol tartrate (LOPRESSOR) 50 MG tablet Take 1 tablet by mouth 2 times daily Yes Dora Weber APRN - CNP   rosuvastatin (CRESTOR) 20 MG tablet Take 1 tablet by mouth daily Yes Beckie Freedman MD   clopidogrel (PLAVIX) 75 MG tablet Take 1 tablet by mouth daily Yes Beckie Freedman MD   loratadine (CLARITIN) 10 MG tablet Take 1 tablet by mouth daily Yes Dora Weber APRN - CNP   ondansetron (ZOFRAN) 4 MG tablet Take 1 tablet by mouth every 8 hours as needed for Nausea Yes Dora Weber APRN - CNP   furosemide (LASIX) 40 MG tablet daily as needed Yes Beckie Freedman MD   aspirin 81 MG EC tablet Take 1 tablet by mouth daily Yes Beckie Freedman MD   nitroGLYCERIN (NITROSTAT) 0.4 MG SL tablet Place 1 tablet under the tongue every 5 minutes as needed for Chest pain Yes Beckie Freedman MD   solifenacin (VESICARE) 5 MG tablet Take 5 mg by mouth daily.  Beckie Freedman MD CareTeam (Including outside providers/suppliers regularly involved in providing care):   Patient Care Team:  Dora Weber APRN - CNP as PCP - General (Nurse

## 2025-01-30 ENCOUNTER — HOSPITAL ENCOUNTER (OUTPATIENT)
Dept: MAMMOGRAPHY | Age: 80
Discharge: HOME | End: 2025-02-01

## 2025-01-30 VITALS — BODY MASS INDEX: 40.1 KG/M2 | WEIGHT: 230 LBS

## 2025-01-30 DIAGNOSIS — Z12.31 OTHER SCREENING MAMMOGRAM: ICD-10-CM

## 2025-01-30 PROCEDURE — 77063 BREAST TOMOSYNTHESIS BI: CPT

## 2025-02-26 ENCOUNTER — TELEPHONE (OUTPATIENT)
Dept: PRIMARY CARE CLINIC | Age: 80
End: 2025-02-26

## 2025-02-26 RX ORDER — ISOSORBIDE MONONITRATE 30 MG/1
30 TABLET, EXTENDED RELEASE ORAL DAILY
Qty: 30 TABLET | Status: CANCELLED | OUTPATIENT
Start: 2025-02-26

## 2025-02-26 NOTE — TELEPHONE ENCOUNTER
Last OV:  1/10/2025    Next OV: 7/10/2025    Pharmacy:   [id number] - [place], [place] - [address] [phone] - F [phone]  [address] [place] [address]  Phone: [phone] Fax: [phone]           Confirmed? Yes     Pt knows it was discontinued in 2023 but she states she started taking it again for chest pain.    Please advise   Thanks

## 2025-02-26 NOTE — TELEPHONE ENCOUNTER
This was not ordered by our office. Would suggest for her to review with cardiology prior to resuming use

## 2025-02-26 NOTE — TELEPHONE ENCOUNTER
Pt is requesting refill on Semaglutide 1.8 mg, pt would like to up dose 2.268 mg     [facility] pharmacy Please advise.

## 2025-02-28 DIAGNOSIS — E03.9 HYPOTHYROIDISM, UNSPECIFIED TYPE: ICD-10-CM

## 2025-02-28 RX ORDER — LEVOTHYROXINE SODIUM 50 UG/1
50 TABLET ORAL DAILY
Qty: 90 TABLET | Refills: 0 | Status: SHIPPED | OUTPATIENT
Start: 2025-02-28

## 2025-05-09 ENCOUNTER — TELEPHONE (OUTPATIENT)
Dept: PRIMARY CARE CLINIC | Age: 80
End: 2025-05-09

## 2025-05-09 NOTE — TELEPHONE ENCOUNTER
Pt called into office stating she called [facility] for a refill but was told that they need a new form because of the new change in their policies.    Writer faxed over the new form.

## 2025-06-20 DIAGNOSIS — E03.9 HYPOTHYROIDISM, UNSPECIFIED TYPE: ICD-10-CM

## 2025-06-20 RX ORDER — LEVOTHYROXINE SODIUM 50 UG/1
50 TABLET ORAL DAILY
Qty: 90 TABLET | Refills: 0 | Status: SHIPPED | OUTPATIENT
Start: 2025-06-20

## 2025-07-07 RX ORDER — PANTOPRAZOLE SODIUM 40 MG/1
40 TABLET, DELAYED RELEASE ORAL
Qty: 90 TABLET | Refills: 1 | Status: SHIPPED | OUTPATIENT
Start: 2025-07-07

## 2025-07-10 ENCOUNTER — OFFICE VISIT (OUTPATIENT)
Dept: PRIMARY CARE CLINIC | Age: 80
End: 2025-07-10
Payer: MEDICARE

## 2025-07-10 VITALS
SYSTOLIC BLOOD PRESSURE: 128 MMHG | HEART RATE: 101 BPM | BODY MASS INDEX: 38.22 KG/M2 | WEIGHT: 219.2 LBS | OXYGEN SATURATION: 97 % | DIASTOLIC BLOOD PRESSURE: 86 MMHG

## 2025-07-10 DIAGNOSIS — E03.9 HYPOTHYROIDISM, UNSPECIFIED TYPE: ICD-10-CM

## 2025-07-10 DIAGNOSIS — C67.9 MALIGNANT NEOPLASM OF URINARY BLADDER, UNSPECIFIED SITE (HCC): ICD-10-CM

## 2025-07-10 DIAGNOSIS — G47.33 OSA (OBSTRUCTIVE SLEEP APNEA): ICD-10-CM

## 2025-07-10 DIAGNOSIS — R19.7 DIARRHEA, UNSPECIFIED TYPE: ICD-10-CM

## 2025-07-10 DIAGNOSIS — R00.2 PALPITATIONS: ICD-10-CM

## 2025-07-10 DIAGNOSIS — I10 PRIMARY HYPERTENSION: ICD-10-CM

## 2025-07-10 DIAGNOSIS — F51.01 PRIMARY INSOMNIA: Primary | ICD-10-CM

## 2025-07-10 DIAGNOSIS — E78.5 DYSLIPIDEMIA: ICD-10-CM

## 2025-07-10 DIAGNOSIS — T63.461S WASP STING, ACCIDENTAL OR UNINTENTIONAL, SEQUELA: ICD-10-CM

## 2025-07-10 PROCEDURE — G8400 PT W/DXA NO RESULTS DOC: HCPCS | Performed by: NURSE PRACTITIONER

## 2025-07-10 PROCEDURE — G8427 DOCREV CUR MEDS BY ELIG CLIN: HCPCS | Performed by: NURSE PRACTITIONER

## 2025-07-10 PROCEDURE — 99214 OFFICE O/P EST MOD 30 MIN: CPT | Performed by: NURSE PRACTITIONER

## 2025-07-10 PROCEDURE — 3074F SYST BP LT 130 MM HG: CPT | Performed by: NURSE PRACTITIONER

## 2025-07-10 PROCEDURE — 1160F RVW MEDS BY RX/DR IN RCRD: CPT | Performed by: NURSE PRACTITIONER

## 2025-07-10 PROCEDURE — 1123F ACP DISCUSS/DSCN MKR DOCD: CPT | Performed by: NURSE PRACTITIONER

## 2025-07-10 PROCEDURE — 1159F MED LIST DOCD IN RCRD: CPT | Performed by: NURSE PRACTITIONER

## 2025-07-10 PROCEDURE — 3079F DIAST BP 80-89 MM HG: CPT | Performed by: NURSE PRACTITIONER

## 2025-07-10 PROCEDURE — G2211 COMPLEX E/M VISIT ADD ON: HCPCS | Performed by: NURSE PRACTITIONER

## 2025-07-10 PROCEDURE — G8417 CALC BMI ABV UP PARAM F/U: HCPCS | Performed by: NURSE PRACTITIONER

## 2025-07-10 PROCEDURE — 1090F PRES/ABSN URINE INCON ASSESS: CPT | Performed by: NURSE PRACTITIONER

## 2025-07-10 PROCEDURE — 1036F TOBACCO NON-USER: CPT | Performed by: NURSE PRACTITIONER

## 2025-07-10 RX ORDER — TEMAZEPAM 15 MG/1
15 CAPSULE ORAL NIGHTLY PRN
Qty: 7 CAPSULE | Refills: 0 | Status: SHIPPED | OUTPATIENT
Start: 2025-07-10 | End: 2025-07-17

## 2025-07-10 RX ORDER — SULFAMETHOXAZOLE AND TRIMETHOPRIM 800; 160 MG/1; MG/1
1 TABLET ORAL 2 TIMES DAILY
Qty: 14 TABLET | Refills: 0 | Status: SHIPPED | OUTPATIENT
Start: 2025-07-10 | End: 2025-07-17

## 2025-07-10 RX ORDER — PREDNISONE 20 MG/1
20 TABLET ORAL 2 TIMES DAILY
Qty: 10 TABLET | Refills: 0 | Status: SHIPPED | OUTPATIENT
Start: 2025-07-10 | End: 2025-07-15

## 2025-07-10 ASSESSMENT — ENCOUNTER SYMPTOMS
BACK PAIN: 0
SHORTNESS OF BREATH: 0
COUGH: 0
ABDOMINAL PAIN: 1

## 2025-07-10 NOTE — PROGRESS NOTES
[facility]  [facility] PRIMARY CARE  [address] [place] [address] [address]  [address]  [facility] [address]  Dept: [phone]  Dept Fax: [phone]    Renee Antonio is a 80 y.o. female who presentstoday for her medical conditions/complaints as noted below.  Renee Antonio is c/o of  Chief Complaint   Patient presents with    Medication Check           HPI:     Presents for 6 month recheck on chronic conditions  BP well controlled  Has lost 11lb since LOV-congratulated patient  Using wegovy through [facility] at max dose  Feels she has hit a \"plateau\"  Continue diet/exercise. Encouraged to change injection site    Recent UC visit for wasp sting  Given benadryl IM  Continues to have left lower extremity redness/swelling and pain  Will treat as cellulitis    Continued insomnia  Does not feel ambien is working well, but using only prn  Feels singing in her head is the most helpful  Willing to try alternate med on prn basis  Sent short rx restoril for trial    Continued abdominal pain/diarrhea  Following with GI- suggested colonoscopy but she did not schedule due to her age  Reviewed that with her symptoms- would encourage her to complete colonoscopy  Using imodium prn  No changes to bowel with GLP 1 use    C/o palpitations that come and go. She is concerned for afib  Called cardiology in June- was advised to go to the ER  Last holter completed 2021, no convincing Afib  Has recheck with cardiology scheduled  Offered holter monitor but she declines as she is not currently symptomatic  Will continue to monitor    Denies any other problems/concerns        Hemoglobin A1C (%)   Date Value   09/05/2024 5.5   07/12/2023 5.2   12/14/2020 5.7             ( goal A1C is < 7)   No components found for: \"LABMICR\"  No components found for: \"LDLCHOLESTEROL\", \"LDLCALC\"    (goal LDL is <100)   AST (U/L)   Date Value   05/16/2025 17     ALT (U/L)   Date Value   05/16/2025 15     BUN (MG/DL)   Date Value   05/16/2025 19     BP

## 2025-07-30 NOTE — H&P (VIEW-ONLY)
HISTORY and [id number]       NAME:  Billie Enamorado  MRN: [id number]   YOB: 1945   Date: 11/6/2020   Age: 66 y.o. Gender: female       COMPLAINT AND PRESENT HISTORY:     Billie Enamorado is 66 y.o. , female, Preadmission Testing for OAB (overactive bladder) and Malignant neoplasm of urinary bladder. Patient is s/p baldder surgery. Patient has a hx of incontinence and been on Myrbetriq since 6 weeks, she complains of expensive cost and minimal improvement. She goes through several pads per day. She goes about every 30 minutes during  the day. She has some episodes of leaking when she stands up as well. The meds  helped a bit,      but not enough to justify the meds. Patient will be having CYSTOSCOPY INJECTION BOTOX 100 UNITS . Patient has had Botox treatment for the first at the beginning of the year. before and has noticed less leaking, less frequency and less nocturia. Patient has hx of  HTN,  DAISY, CAD she has x3 stents in place. Patient states that she is on baby aspirin and beta blocker. Pt reports that she does use a C-PAP at night. Patient denies any chest pain, palpitations or SOB. Patient denies any dysuria or hematuria. No fever or chills. PAST MEDICAL HISTORY     Past Medical History:   Diagnosis Date    AK (actinic keratosis)     from sun exposure    Anesthesia     HARD TIME WAKING UP \"SOMETIMES\".     Arthritis     Asymptomatic bilateral carotid artery stenosis 3/10/2015    Bladder cancer (HCC)     bladder, first time 1993    CAD (coronary artery disease)     Colon polyp     Dr Seth Miguel    Diarrhea     Difficult intubation     SMALL MOUTH AND AIRWAY    GERD (gastroesophageal reflux disease)     History of atrial fibrillation     not chronic    Hypertension     Hypothyroid     Obesity     DAISY (obstructive sleep apnea)     NO MACHINE, she has bad sinus issues and requested alternatives instead of a machine, Not much I could do for this old broad  nothing was done   [facility] (premature atrial contraction)     occasionally    Prolonged emergence from general anesthesia     Stress incontinence     Wears glasses     FOR READING. SURGICAL HISTORY       Past Surgical History:   Procedure Laterality Date    BACK SURGERY  2019    stem cell injection    BLADDER SURGERY  8/1993 and 10/2002    for bladder cancer treatment    BREAST BIOPSY      LT. BREAST (BENIGN). , large mass removed    CHOLECYSTECTOMY      COLONOSCOPY      3-4X    CORONARY ANGIOPLASTY  07/03/2014    3 stents inserted via heart cath    CYST REMOVAL Right     3RD FINGER.    CYSTOSCOPY  2014    Dr Alisa Aguilar  [id number]    TURB-bladder tumors removed    CYSTOSCOPY  11/18/2016    fulgeration with biopsies    CYSTOSCOPY N/A 1/21/2020    CYSTO BOTOX INJECTION 100UNITS performed by Nick Porter MD at [facility]  EYES.    HEMORRHOID SURGERY      HYSTERECTOMY, TOTAL ABDOMINAL  1999    unsure if ovaries remain    KNEE SURGERY Bilateral 07/01/2020    stem cell injections    OTHER SURGICAL HISTORY      abscess on tailbone    TONSILLECTOMY         FAMILY HISTORY       Family History   Problem Relation Age of Onset   [facility] Cancer Mother         pancreatic age 80    Other Mother         Vascular disease    Cancer Father         bladder age 80    Heart Disease Maternal Grandmother     Heart Disease Maternal Grandfather     Heart Disease Paternal Grandfather     Cancer Paternal Aunt         ovarian       SOCIAL HISTORY       Social History     Socioeconomic History    Marital status:      Spouse name: None    Number of children: None    Years of education: None    Highest education level: None   Occupational History    None   Social Needs    Financial resource strain: None    Food insecurity     Worry: None     Inability: None    Transportation needs     Medical: None     Non-medical: None   Tobacco Use    Smoking status: Former Smoker Packs/day: 1.50     Years: 30.00     Pack years: 45.00     Types: Cigarettes     Last attempt to quit: 1992     Years since quittin.8    Smokeless tobacco: Never Used   Substance and Sexual Activity    Alcohol use: Yes     Comment: COUPLE DRINKS PER WEEK.  Drug use: No    Sexual activity: None   Lifestyle    Physical activity     Days per week: None     Minutes per session: None    Stress: None   Relationships    Social connections     Talks on phone: None     Gets together: None     Attends Confucianism service: None     Active member of club or organization: None     Attends meetings of clubs or organizations: None     Relationship status: None    Intimate partner violence     Fear of current or ex partner: None     Emotionally abused: None     Physically abused: None     Forced sexual activity: None   Other Topics Concern    None   Social History Narrative    None        REVIEW OF SYSTEMS      Allergies   Allergen Reactions    Atorvastatin Other (See Comments)     diarrhea    Other Other (See Comments)     Passed out and because of multiple issues with this combo drug, Penstrept was taken off market in 1960s, It contained Penicillin and Streptomycin in one pill. Patient states she has taken penicillin and/or streptomycin individually without problems.     Tobramycin Swelling    Molds & Smuts Other (See Comments)     coughing       Current Outpatient Medications on File Prior to Encounter   Medication Sig Dispense Refill    losartan (COZAAR) 100 MG tablet Take 1 tablet by mouth daily 90 tablet 0    levothyroxine (SYNTHROID) 50 MCG tablet TAKE ONE TABLET BY MOUTH DAILY 90 tablet 1    furosemide (LASIX) 20 MG tablet TAKE ONE TABLET BY MOUTH DAILY AS NEEDED FOR PEDAL EDEMA 60 tablet 0    pantoprazole (PROTONIX) 40 MG tablet Take 40 mg by mouth daily       metoprolol tartrate (LOPRESSOR) 25 MG tablet Take 1 tablet by mouth 2 times daily 180 tablet 3    zolpidem (AMBIEN) 5 MG tablet Take by mouth nightly as needed.  clotrimazole-betamethasone (LOTRISONE) 1-0.05 % cream Apply externally to affected area twice daily prn 45 g 1    aspirin 81 MG EC tablet Take 81 mg by mouth daily      nitroGLYCERIN (NITROSTAT) 0.4 MG SL tablet Place 0.4 mg under the tongue every 5 minutes as needed for Chest pain.  Multiple Vitamins-Minerals (THERAPEUTIC MULTIVITAMIN-MINERALS) tablet Take 1 tablet by mouth daily.  [DISCONTINUED] solifenacin (VESICARE) 5 MG tablet Take 5 mg by mouth daily. No current facility-administered medications on file prior to encounter. General health:  Fairly good. No fever or chills. Skin:  No itching, redness or rash. HEENT:  No headache, epistaxis or sore throat. Neck:  No pain, stiffness or masses. Cardiovascular/Respiratory system:  No chest pain, palpitation or shortness of breath. Gastrointestinal tract: No abdominal pain, Dysphagia, nausea, vomiting, diarrhea or constipation. Genitourinary:  See HPI. Locomotor:  No bone or joint pains. No swelling. Neuropsychiatric:  No referable complaints. GENERAL PHYSICAL EXAM:     Vitals: BP (!) 152/88   Pulse 75   Temp 97.2 °F (36.2 °C) (Infrared)   Resp 16   Ht 5' 3\" (1.6 m)   Wt 238 lb (108 kg)   SpO2 97%   BMI 42.16 kg/m²  Body mass index is 42.16 kg/m². GENERAL APPEARANCE:   Effie Pineda is 66 y.o.,  female, moderately obese, nourished, conscious, alert. Does not appear to be distress or pain at this time. SKIN:  Warm, dry, no cyanosis or jaundice. HEAD:  Normocephalic, atraumatic, no swelling or tenderness. EYES:  Pupils equal, reactive to light. EARS:  No discharge, no marked hearing loss. NOSE:  No rhinorrhea, epistaxis or septal deformity.                  THROAT: Not congested. No ulceration bleeding or discharge. NECK:  No stiffness, trachea central.                  CHEST:  Symmetrical and equal on expansion. HEART:  RRR S1 > S2. No audible murmurs or gallops. LUNGS:  Equal on expansion, normal breath sounds. No adventitious sounds. ABDOMEN:  Obese. Soft on palpation. No localized tenderness. No guarding or rigidity. No palpable hepatosplenomegaly. LYMPHATICS:  No palpable cervical lymphadenopathy. LOCOMOTOR, BACK AND SPINE:  No tenderness or deformities. EXTREMITIES:  Symmetrical,  Mild  pretibial edema. Bettyes sign negative or no calf tenderness. No discoloration or ulcerations. NEUROLOGIC:  The patient is conscious, alert, oriented,Cranial nerve II-XII intact, taste and smell were not examined. No apparent focal sensory or motor deficits.                                                                                      PROVISIONAL DIAGNOSES / SURGERY:       OAB (overactive bladder)      Malignant neoplasm of urinary bladder    CYSTOSCOPY INJECTION BOTOX 100 UNITS     Patient Active Problem List    Diagnosis Date Noted    Angular cheilitis due to nutritional deficiency 07/21/2020    Primary osteoarthritis of right knee 05/17/2020    Actinic keratosis 02/12/2020    Polyp of colon 02/12/2020    Gastroesophageal reflux disease 02/12/2020    Helton's esophagus without dysplasia 10/22/2019    Left ventricular hypertrophy 02/21/2019    Dyslipidemia 05/07/2018    Atherosclerosis of native coronary artery of native heart without angina pectoris 05/23/2017    Breast cyst 06/23/2015    Cyst of breast 06/23/2015    Carotid stenosis 04/14/2015    Asymptomatic bilateral carotid artery stenosis 03/10/2015    Carotid artery stenosis 03/10/2015    Bladder cancer ([facility] [address] 08/25/2014    Malignant tumor of urinary bladder ([facility] [address] 08/25/2014    Neurogenic bladder 08/05/2014  DDD (degenerative disc disease), lumbar 03/04/2014    DAISY (obstructive sleep apnea)     AK (actinic keratosis)     Hypothyroid     Irritable bowel syndrome     Insomnia     Colon polyp     Obesity     Hypertension            DARA BRADFORD, APRN - CNP on 11/6/2020 at 11:58 AM

## 2025-08-26 ENCOUNTER — PROCEDURE VISIT (OUTPATIENT)
Dept: UROLOGY | Age: 80
End: 2025-08-26
Payer: MEDICARE

## 2025-08-26 ENCOUNTER — HOSPITAL ENCOUNTER (OUTPATIENT)
Age: 80
Setting detail: SPECIMEN
Discharge: HOME | End: 2025-08-26

## 2025-08-26 DIAGNOSIS — Z85.51 HISTORY OF BLADDER CANCER: Primary | ICD-10-CM

## 2025-08-26 DIAGNOSIS — L30.4 INTERTRIGO: ICD-10-CM

## 2025-08-26 PROCEDURE — 52000 CYSTOURETHROSCOPY: CPT | Performed by: UROLOGY

## 2025-08-26 RX ORDER — CLOTRIMAZOLE AND BETAMETHASONE DIPROPIONATE 10; .64 MG/G; MG/G
CREAM TOPICAL
Qty: 45 G | Refills: 1 | Status: SHIPPED | OUTPATIENT
Start: 2025-08-26

## 2025-08-27 DIAGNOSIS — Z85.51 HISTORY OF BLADDER CANCER: ICD-10-CM

## 2025-08-28 LAB — SURGICAL PATHOLOGY REPORT: NORMAL

## 2025-08-29 LAB
CASE NUMBER:: NORMAL
SPECIMEN DESCRIPTION: NORMAL

## 2025-08-30 RX ORDER — METOPROLOL TARTRATE 50 MG
50 TABLET ORAL 2 TIMES DAILY
Qty: 180 TABLET | Refills: 3 | Status: SHIPPED | OUTPATIENT
Start: 2025-08-30

## 2025-09-03 ENCOUNTER — TELEPHONE (OUTPATIENT)
Dept: PRIMARY CARE CLINIC | Age: 80
End: 2025-09-03

## 2025-09-04 DIAGNOSIS — R35.1 NOCTURIA: ICD-10-CM

## 2025-09-04 DIAGNOSIS — R30.0 DYSURIA: Primary | ICD-10-CM

## (undated) DEVICE — ST CHARLES CYSTO PACK: Brand: MEDLINE INDUSTRIES, INC.

## (undated) DEVICE — GLOVE ORANGE PI 7 1/2   MSG9075

## (undated) DEVICE — SYRINGE MED 3ML CLR PLAS STD N CTRL LUERLOCK TIP DISP

## (undated) DEVICE — SYRINGE MED 10ML LUERLOCK TIP W/O SFTY DISP

## (undated) DEVICE — SYRINGE, LUER LOCK, 10ML: Brand: MEDLINE

## (undated) DEVICE — SOLUTION IV IRRIG WATER 1000ML POUR BRL 2F7114

## (undated) DEVICE — SOLUTION IRRIG 3000ML 0.9% SOD CHL USP UROMATIC PLAS CONT

## (undated) DEVICE — DISPOSABLE NEEDLE: Brand: DISPOSABLE NEEDLE

## (undated) DEVICE — GLOVE SURG SZ 75 STD WHT LTX SYN POLYMER BEAD REINF ANTI RL

## (undated) DEVICE — SOLUTION IRRIG 1000ML STRL H2O USP PLAS POUR BTL

## (undated) DEVICE — STRAP,POSITIONING,KNEE/BODY,FOAM,4X60": Brand: MEDLINE

## (undated) DEVICE — STRAP POS MP 30X3 IN HK LOOP CLOSURE FOAM DISP

## (undated) DEVICE — GLOVE SURG 7.5 PF POLYMER WHT STRL SIGN LTX ESSENTIAL LTX

## (undated) DEVICE — Device: Brand: SINGLE USE INJECTOR NM-221C-0427

## (undated) DEVICE — 3 ML SYRINGE LUER-LOCK TIP: Brand: MONOJECT

## (undated) DEVICE — SOLUTION IRRIGATION STRL H2O 1000 ML UROMATIC CONTAINER